# Patient Record
Sex: MALE | Race: WHITE | Employment: FULL TIME | URBAN - METROPOLITAN AREA
[De-identification: names, ages, dates, MRNs, and addresses within clinical notes are randomized per-mention and may not be internally consistent; named-entity substitution may affect disease eponyms.]

---

## 2017-07-10 PROBLEM — R06.02 SHORTNESS OF BREATH: Status: ACTIVE | Noted: 2017-07-10

## 2017-07-10 PROBLEM — Z82.49 FAMILY HISTORY OF ISCHEMIC HEART DISEASE (IHD): Status: ACTIVE | Noted: 2017-07-10

## 2017-07-17 ENCOUNTER — HOSPITAL ENCOUNTER (OUTPATIENT)
Dept: CT IMAGING | Age: 62
Discharge: HOME | End: 2017-07-17
Attending: INTERNAL MEDICINE
Payer: COMMERCIAL

## 2017-07-17 ENCOUNTER — HOSPITAL ENCOUNTER (OUTPATIENT)
Dept: GENERAL RADIOLOGY | Age: 62
Discharge: HOME | End: 2017-07-17
Attending: INTERNAL MEDICINE
Payer: COMMERCIAL

## 2017-07-17 DIAGNOSIS — Z82.49 FAMILY HISTORY OF ISCHEMIC HEART DISEASE (IHD): ICD-10-CM

## 2017-07-17 DIAGNOSIS — R06.02 SHORTNESS OF BREATH: ICD-10-CM

## 2017-07-17 PROCEDURE — 75571 CT HRT W/O DYE W/CA TEST: CPT

## 2017-07-17 PROCEDURE — 71020 XR CHEST PA LAT: CPT

## 2017-07-17 NOTE — PROGRESS NOTES
I spoke w/pt. wife the Ursula Gregory is scheduled this Friday and I moved up fu appt. to Monday the 24th w/DR. Ruiz/MA. Wife voiced understanding.

## 2017-07-24 ENCOUNTER — HOSPITAL ENCOUNTER (OUTPATIENT)
Dept: LAB | Age: 62
Discharge: HOME | End: 2017-07-24
Attending: INTERNAL MEDICINE
Payer: COMMERCIAL

## 2017-07-24 DIAGNOSIS — R06.02 SHORTNESS OF BREATH: ICD-10-CM

## 2017-07-24 LAB
ANION GAP BLD CALC-SCNC: 7 MMOL/L
BASOPHILS # BLD AUTO: 0 K/UL (ref 0–0.2)
BASOPHILS # BLD: 0 % (ref 0–2)
BUN SERPL-MCNC: 28 MG/DL (ref 8–23)
CALCIUM SERPL-MCNC: 9 MG/DL (ref 8.3–10.4)
CHLORIDE SERPL-SCNC: 100 MMOL/L (ref 98–107)
CO2 SERPL-SCNC: 29 MMOL/L (ref 21–32)
CREAT SERPL-MCNC: 0.9 MG/DL (ref 0.8–1.5)
DIFFERENTIAL METHOD BLD: ABNORMAL
EOSINOPHIL # BLD: 0.1 K/UL (ref 0–0.8)
EOSINOPHIL NFR BLD: 2 % (ref 0.5–7.8)
ERYTHROCYTE [DISTWIDTH] IN BLOOD BY AUTOMATED COUNT: 11.9 % (ref 11.9–14.6)
GLUCOSE SERPL-MCNC: 96 MG/DL (ref 65–100)
HCT VFR BLD AUTO: 42 % (ref 41.1–50.3)
HGB BLD-MCNC: 14.4 G/DL (ref 13.6–17.2)
INR PPP: 1.1 (ref 0.9–1.2)
LYMPHOCYTES # BLD AUTO: 30 % (ref 13–44)
LYMPHOCYTES # BLD: 1.4 K/UL (ref 0.5–4.6)
MCH RBC QN AUTO: 32.8 PG (ref 26.1–32.9)
MCHC RBC AUTO-ENTMCNC: 34.3 G/DL (ref 31.4–35)
MCV RBC AUTO: 95.7 FL (ref 79.6–97.8)
MONOCYTES # BLD: 0.5 K/UL (ref 0.1–1.3)
MONOCYTES NFR BLD AUTO: 11 % (ref 4–12)
NEUTS SEG # BLD: 2.7 K/UL (ref 1.7–8.2)
NEUTS SEG NFR BLD AUTO: 57 % (ref 43–78)
PLATELET # BLD AUTO: 155 K/UL (ref 150–450)
PMV BLD AUTO: 8.5 FL (ref 10.8–14.1)
POTASSIUM SERPL-SCNC: 3.9 MMOL/L (ref 3.5–5.1)
PROTHROMBIN TIME: 10.9 SEC (ref 9.6–12)
RBC # BLD AUTO: 4.39 M/UL (ref 4.23–5.67)
SODIUM SERPL-SCNC: 136 MMOL/L (ref 136–145)
WBC # BLD AUTO: 4.8 K/UL (ref 4.3–11.1)

## 2017-07-24 PROCEDURE — 36415 COLL VENOUS BLD VENIPUNCTURE: CPT | Performed by: INTERNAL MEDICINE

## 2017-07-24 PROCEDURE — 85610 PROTHROMBIN TIME: CPT | Performed by: INTERNAL MEDICINE

## 2017-07-24 PROCEDURE — 80048 BASIC METABOLIC PNL TOTAL CA: CPT | Performed by: INTERNAL MEDICINE

## 2017-07-24 PROCEDURE — 85025 COMPLETE CBC W/AUTO DIFF WBC: CPT | Performed by: INTERNAL MEDICINE

## 2017-07-25 ENCOUNTER — HOSPITAL ENCOUNTER (OUTPATIENT)
Dept: CARDIAC CATH/INVASIVE PROCEDURES | Age: 62
Discharge: HOME | End: 2017-07-25
Attending: INTERNAL MEDICINE | Admitting: INTERNAL MEDICINE
Payer: COMMERCIAL

## 2017-07-25 VITALS
HEART RATE: 80 BPM | WEIGHT: 154 LBS | BODY MASS INDEX: 22.81 KG/M2 | DIASTOLIC BLOOD PRESSURE: 70 MMHG | RESPIRATION RATE: 13 BRPM | OXYGEN SATURATION: 99 % | TEMPERATURE: 97.8 F | SYSTOLIC BLOOD PRESSURE: 113 MMHG | HEIGHT: 69 IN

## 2017-07-25 LAB
ACT BLD: 268 SECS (ref 70–128)
ATRIAL RATE: 76 BPM
CALCULATED P AXIS, ECG09: 46 DEGREES
CALCULATED R AXIS, ECG10: 37 DEGREES
CALCULATED T AXIS, ECG11: 20 DEGREES
DIAGNOSIS, 93000: NORMAL
P-R INTERVAL, ECG05: 154 MS
Q-T INTERVAL, ECG07: 370 MS
QRS DURATION, ECG06: 82 MS
QTC CALCULATION (BEZET), ECG08: 416 MS
VENTRICULAR RATE, ECG03: 76 BPM

## 2017-07-25 PROCEDURE — 93005 ELECTROCARDIOGRAM TRACING: CPT | Performed by: INTERNAL MEDICINE

## 2017-07-25 PROCEDURE — C1887 CATHETER, GUIDING: HCPCS

## 2017-07-25 PROCEDURE — 74011000250 HC RX REV CODE- 250: Performed by: INTERNAL MEDICINE

## 2017-07-25 PROCEDURE — 93458 L HRT ARTERY/VENTRICLE ANGIO: CPT

## 2017-07-25 PROCEDURE — 74011636320 HC RX REV CODE- 636/320: Performed by: INTERNAL MEDICINE

## 2017-07-25 PROCEDURE — 85347 COAGULATION TIME ACTIVATED: CPT

## 2017-07-25 PROCEDURE — 74011000258 HC RX REV CODE- 258: Performed by: INTERNAL MEDICINE

## 2017-07-25 PROCEDURE — C1769 GUIDE WIRE: HCPCS

## 2017-07-25 PROCEDURE — 77030029997 HC DEV COM RDL R BND TELE -B

## 2017-07-25 PROCEDURE — 99152 MOD SED SAME PHYS/QHP 5/>YRS: CPT

## 2017-07-25 PROCEDURE — 74011250636 HC RX REV CODE- 250/636

## 2017-07-25 PROCEDURE — 77030015766

## 2017-07-25 PROCEDURE — 99153 MOD SED SAME PHYS/QHP EA: CPT

## 2017-07-25 PROCEDURE — C1894 INTRO/SHEATH, NON-LASER: HCPCS

## 2017-07-25 PROCEDURE — 74011250636 HC RX REV CODE- 250/636: Performed by: INTERNAL MEDICINE

## 2017-07-25 PROCEDURE — 77030004534 HC CATH ANGI DX INFN CARD -A

## 2017-07-25 PROCEDURE — 93571 IV DOP VEL&/PRESS C FLO 1ST: CPT

## 2017-07-25 RX ORDER — ACETAMINOPHEN 325 MG/1
650 TABLET ORAL
Status: DISCONTINUED | OUTPATIENT
Start: 2017-07-25 | End: 2017-07-25 | Stop reason: HOSPADM

## 2017-07-25 RX ORDER — SODIUM CHLORIDE 0.9 % (FLUSH) 0.9 %
5-10 SYRINGE (ML) INJECTION AS NEEDED
Status: DISCONTINUED | OUTPATIENT
Start: 2017-07-25 | End: 2017-07-25 | Stop reason: HOSPADM

## 2017-07-25 RX ORDER — FENTANYL CITRATE 50 UG/ML
25-100 INJECTION, SOLUTION INTRAMUSCULAR; INTRAVENOUS
Status: DISCONTINUED | OUTPATIENT
Start: 2017-07-25 | End: 2017-07-25 | Stop reason: HOSPADM

## 2017-07-25 RX ORDER — HEPARIN SODIUM 200 [USP'U]/100ML
3 INJECTION, SOLUTION INTRAVENOUS CONTINUOUS
Status: DISCONTINUED | OUTPATIENT
Start: 2017-07-25 | End: 2017-07-25 | Stop reason: HOSPADM

## 2017-07-25 RX ORDER — MIDAZOLAM HYDROCHLORIDE 1 MG/ML
.5-5 INJECTION, SOLUTION INTRAMUSCULAR; INTRAVENOUS
Status: DISCONTINUED | OUTPATIENT
Start: 2017-07-25 | End: 2017-07-25 | Stop reason: HOSPADM

## 2017-07-25 RX ORDER — SODIUM CHLORIDE 9 MG/ML
75 INJECTION, SOLUTION INTRAVENOUS CONTINUOUS
Status: DISCONTINUED | OUTPATIENT
Start: 2017-07-25 | End: 2017-07-25 | Stop reason: HOSPADM

## 2017-07-25 RX ORDER — HYDROCODONE BITARTRATE AND ACETAMINOPHEN 5; 325 MG/1; MG/1
1 TABLET ORAL
Status: DISCONTINUED | OUTPATIENT
Start: 2017-07-25 | End: 2017-07-25 | Stop reason: HOSPADM

## 2017-07-25 RX ORDER — GUAIFENESIN 100 MG/5ML
324 LIQUID (ML) ORAL ONCE
Status: DISCONTINUED | OUTPATIENT
Start: 2017-07-25 | End: 2017-07-25 | Stop reason: HOSPADM

## 2017-07-25 RX ORDER — DIAZEPAM 5 MG/1
5 TABLET ORAL ONCE
Status: DISCONTINUED | OUTPATIENT
Start: 2017-07-25 | End: 2017-07-25 | Stop reason: HOSPADM

## 2017-07-25 RX ORDER — ONDANSETRON 2 MG/ML
4 INJECTION INTRAMUSCULAR; INTRAVENOUS
Status: DISCONTINUED | OUTPATIENT
Start: 2017-07-25 | End: 2017-07-25 | Stop reason: HOSPADM

## 2017-07-25 RX ORDER — LIDOCAINE HYDROCHLORIDE 20 MG/ML
1-20 INJECTION, SOLUTION INFILTRATION; PERINEURAL
Status: DISCONTINUED | OUTPATIENT
Start: 2017-07-25 | End: 2017-07-25 | Stop reason: HOSPADM

## 2017-07-25 RX ORDER — HEPARIN SODIUM 10000 [USP'U]/ML
40-80 INJECTION, SOLUTION INTRAVENOUS; SUBCUTANEOUS
Status: DISCONTINUED | OUTPATIENT
Start: 2017-07-25 | End: 2017-07-25 | Stop reason: HOSPADM

## 2017-07-25 RX ORDER — SODIUM CHLORIDE 0.9 % (FLUSH) 0.9 %
5-10 SYRINGE (ML) INJECTION EVERY 8 HOURS
Status: DISCONTINUED | OUTPATIENT
Start: 2017-07-25 | End: 2017-07-25 | Stop reason: HOSPADM

## 2017-07-25 RX ADMIN — MIDAZOLAM HYDROCHLORIDE 2 MG: 1 INJECTION, SOLUTION INTRAMUSCULAR; INTRAVENOUS at 15:42

## 2017-07-25 RX ADMIN — HEPARIN SODIUM 3 ML/HR: 200 INJECTION, SOLUTION INTRAVENOUS at 15:52

## 2017-07-25 RX ADMIN — FENTANYL CITRATE 50 MCG: 50 INJECTION, SOLUTION INTRAMUSCULAR; INTRAVENOUS at 15:35

## 2017-07-25 RX ADMIN — FENTANYL CITRATE 25 MCG: 50 INJECTION, SOLUTION INTRAMUSCULAR; INTRAVENOUS at 15:51

## 2017-07-25 RX ADMIN — ADENOSINE 140 MCG/KG/MIN: 3 INJECTION, SOLUTION INTRAVENOUS at 16:09

## 2017-07-25 RX ADMIN — MIDAZOLAM HYDROCHLORIDE 1 MG: 1 INJECTION, SOLUTION INTRAMUSCULAR; INTRAVENOUS at 15:51

## 2017-07-25 RX ADMIN — LIDOCAINE HYDROCHLORIDE 60 MG: 20 INJECTION, SOLUTION INFILTRATION; PERINEURAL at 15:52

## 2017-07-25 RX ADMIN — HEPARIN SODIUM 5000 UNITS: 10000 INJECTION, SOLUTION INTRAVENOUS; SUBCUTANEOUS at 16:21

## 2017-07-25 RX ADMIN — MIDAZOLAM HYDROCHLORIDE 2 MG: 1 INJECTION, SOLUTION INTRAMUSCULAR; INTRAVENOUS at 15:35

## 2017-07-25 RX ADMIN — IOPAMIDOL 165 ML: 755 INJECTION, SOLUTION INTRAVENOUS at 16:20

## 2017-07-25 RX ADMIN — HEPARIN SODIUM 2 ML: 10000 INJECTION, SOLUTION INTRAVENOUS; SUBCUTANEOUS at 15:48

## 2017-07-25 NOTE — PROGRESS NOTES
Patient up to bedside, vital signs and site stable. Patient ambulated to bathroom without difficulty. Patient voided without difficulty. Vascular site stable. Discharge instructions and home medications reviewed with patient. Time allowed for questions and answers. 1910 Patient ambulated second time without difficulty. Site stable after ambulation. Peripheral IV sites dc'd without difficulty with tips intact. 1915 Patient discharged to home with family.

## 2017-07-25 NOTE — PROCEDURES
[address]       Name:  Stephanie Leo   MR#:  [id number]   :  1955   Account #:  [de-identified]   Date of Adm:  2017       DATE OF PROCEDURE: 2017    PROCEDURES:    1. Left heart catheterization, selective coronary arteriography,   left ventriculogram via the right radial approach. 2. Fractional flow reserve measurement of the left anterior   descending. INDICATION: Chest discomfort consistent with [facility]   Cardiovascular class 3 anginal symptoms in a patient with a very   high calcium score and equivocal stress test showing abnormal   EKG, but normal perfusion. Cardiac catheterization recommended   by Dr. Keenan Salgado. TECHNICAL FACTORS: After informed consent was obtained, the   patient was brought to the cardiac catheterization lab. The   right radial artery was prepped and draped in the usual sterile   fashion. Utilizing modified Seldinger technique and   micropuncture needle, the right radial artery was entered. A 6-  Ethiopian Terumo Slender sheath was placed without difficulty. A   radial cocktail consisting of 2000 units of heparin, 2 mg of   verapamil, 200 mcg of nitroglycerin was administered. A 5-Ethiopian   Tiger 4.0 catheter was used to selectively engage the ostium of   the left main coronary artery and right coronary artery   respectively. Selective injections in various projections were   performed. A pigtail catheter was used to cross the aortic valve   and enter the left ventricle. Hemodynamic measurements and left   ventriculogram were obtained. Left ventricular aortic pressure   gradient was obtained by pullback technique. At the conclusion of diagnostic procedure, the patient was given   an additional 5000 units of heparin for a total of 7000. ACT was   268.  A XB 3.0 guide was used to selectively engage the ostium of   the left main coronary artery and a [employer] wire system was   advanced into the ostium of the left main coronary artery and   appropriately normalized. At this point, this was placed in the   mid to distal LAD. Intravenous adenosine at 140 mcg/kg per   minute was administered with FFR measuring at 0.77 indicating   significant LAD stenosis. This is diffuse in nature from the   proximal to mid vessel. Based on the FFR measurement and the   diffuse LAD disease, it was felt the patient would be best   served with surgical revascularization with LIMA to his LAD and   a vein graft to his left PDA. The wire was removed and final   orthogonal projections were obtained. SEDATION: The patient received a total of 5 mg of Versed and 75   mcg of fentanyl. The sedation was administered by Dejah Amaral RN, under my supervision. Sedation start time was 15:34 with a   procedure end time of 1559. CONTRAST UTILIZATION: Isovue 165. HEMODYNAMIC RESULTS:   1. Aortic pressure 119/66 with a mean of 81.   2. Left ventricular end-diastolic pressure was 9.   3. There is no significant gradient across the aortic valve. ANGIOGRAPHIC RESULTS:     1. Left main coronary artery: This is a large caliber vessel. Angiographically normal.   2. LAD: Heavily calcified in the proximal and mid segment. There   is diffuse 60% proximal to mid stenosis with a focal area of 50%   to 60% stenosis in the mid vessel. The distal vessel is patent. 3. First and second diagonal arteries are very small caliber,   less than 2 mm. 4. Left circumflex: Large caliber, dominant vessel. Contains a   40% distal stenosis. 5. Left PDA: A medium caliber vessel. Contains a 60% to 70%   eccentric proximal stenosis. 6. First obtuse marginal: Medium caliber vessel. Patent. 7. Second obtuse marginal artery: Small caliber vessel. Patent. 8. Right coronary artery: Small caliber, nondominant vessel. Contains a 60% to 70% mid stenosis. 9. Left ventriculogram performed in ROWE projection shows normal   left ventricular systolic function, EF 39% to 65%. No focal   segmental wall motion abnormalities. Aortic root is nondilated. 10. FFR of LAD 0.77 indicating hemodynamically significant   stenosis of the LAD. CONCLUSION:   1. Multivessel coronary artery disease with diffuse proximal to   mid left anterior descending disease which is heavily calcified. The fractional flow reserve measurement at 0.77 indicating   hemodynamically significant stenosis. The patient also with a   stenosis of the proximal portion of the left posterior   descending artery. 2. Normal left ventricular systolic function. 3. Successful fractional flow reserve measurement of the left   anterior descending. Fractional flow reserve was 0.77. PLAN: Surgical evaluation for LIMA to LAD and saphenous vein   graft to left PDA.         MD TORREY Gannon / Robert Stauffer   D:  07/25/2017   16:30   T:  07/25/2017   16:51   Job #:  [id number]

## 2017-07-25 NOTE — PROCEDURES
Brief Cardiac Procedure Note    Patient: Judy Coronado MRN: [id number]  SSN: [id number]    YOB: 1955  Age: 59 y.o. Sex: male      Date of Procedure: 7/25/2017     Pre-procedure Diagnosis: Chest pain CCS Class III    Post-procedure Diagnosis: Coronary Artery Disease    Procedure: Left Heart Catheterization    Brief Description of Procedure: As above    Performed By: Sachin Mckeon MD     Assistants: None    Anesthesia: Moderate Sedation    Estimated Blood Loss: Less than 10 mL      Specimens: None    Implants: None    Findings: diffuse prox and mid LAD. FFR 0.76. 60-70% prox LPDA. Normal EF. Complications: None    Recommendations: Continue medical therapy.     Signed By: Sachin Mckeon MD     July 25, 2017

## 2017-07-25 NOTE — PROGRESS NOTES
Report received from  Cath Lab RN. Procedural findings communicated. Intra procedural  medication administration reviewed. Progression of care discussed.      Patient received into [address] [facility] 6 post sheath removal.     Access site without bleeding or swelling yes    Dressing dry and intact yes    Patient instructed to limit movement to right upper  extremity    Routine post procedural vital signs and site assessment initiated yes

## 2017-07-25 NOTE — PROGRESS NOTES
TRANSFER - OUT REPORT:    Verbal report given to Kelsie Barnett RN(name) on Lindsay Coto  being transferred to cpru(unit) for routine progression of care       Report consisted of patients Situation, Background, Assessment and   Recommendations(SBAR). Information from the following report(s) SBAR was reviewed with the receiving nurse.    has No Known Allergies. Opportunity for questions and clarification was provided. Procedure Summary:Pt had LHC with FFR of LAD via R wrist. Site sealed with R band using 9 ml at 1622 hrs.     Med Administration    Versed:  5 mg  Fentanyl: 75 mcg  Heparin: 5000 units      Visit Vitals    /70 (BP 1 Location: Left arm, BP Patient Position: Supine)    Pulse 62    Temp 97.8 °F (36.6 °C)    Resp 18    Ht 5' 9\" (1.753 m)    Wt 69.9 kg (154 lb)    SpO2 96%    BMI 22.74 kg/m2     Past Medical History:   Diagnosis Date    Hypercholesterolemia     Hypertension     Leukopenia            Peripheral IV 07/25/17 Left Antecubital (Active)       Peripheral IV 07/25/17 Right Antecubital (Active)

## 2017-07-25 NOTE — DISCHARGE INSTRUCTIONS
Call Dr. India Ochoa' office in the morning at [phone]. HEART CATHETERIZATION/ANGIOGRAPHY DISCHARGE INSTRUCTIONS    1. Check puncture site frequently for swelling or bleeding. If there is any bleeding, lie down and apply pressure over the area with a clean towel or washcloth and call [phone]. Notify your doctor for any redness, swelling, drainage, or oozing from the puncture site. Notify your doctor for any fever or chills. 2. If the extremity becomes cold, numb, or painful call Dr. Carly Porras at [phone].  3. Activity should be limited for the next 48 hours. Avoid pushing, pulling and bending of affected wrist for 48 hours. No heavy lifting (anything over 5 pounds) for 3 days. No driving for 48 hours. 4. You may resume your usual diet. Drink more fluids than usual.  5. Have a responsible person drive you home and stay with you for at least 24 hours after your heart catheterization/angiography. 6. You may remove bandage from your right arm  in 24 hours. You may shower in 24 hours. No tub baths, hot tubs, or swimming for 1 week. Do not place any lotions, creams, powders, or ointments over puncture site for 1 week. You may place a clean band-aid over the puncture site each day for 5 days. Change daily. I have read the above instructions and have had the opportunity to ask questions.

## 2017-07-25 NOTE — IP AVS SNAPSHOT
Home 
 
 
 [address] 
[phone] Patient: Ariel San MRN: [id number] :1955 Discharge Summary 2017 Ariel San MRN[de-identified]  [id number] Admission Information Provider Pager Service Admission Date Expected D/C Date Adamaris Heath MD  CARDIAC CATH LAB 2017 Actual LOS Patient Class 0 days OUTPATIENT Follow-up Information None Current Discharge Medication List  
  
ASK your doctor about these medications Dose & Instructions Dispensing Information Comments Morning Noon Evening Bedtime  
 aspirin delayed-release 81 mg tablet Your last dose was: Your next dose is: Take  by mouth daily. Refills:  0  
     
   
   
   
  
 B COMPLEX 1 tablet Generic drug:  b complex vitamins Your last dose was: Your next dose is:    
   
   
 Dose:  1 Tab Take 1 Tab by mouth daily. Refills:  0  
     
   
   
   
  
 benazepril 40 mg tablet Commonly known as:  LOTENSIN Your last dose was: Your next dose is:    
   
   
 Dose:  40 mg Take 40 mg by mouth daily. Refills:  0  
     
   
   
   
  
 fish oil-omega-3 fatty acids 340-1,000 mg capsule Your last dose was: Your next dose is:    
   
   
 Dose:  1 Cap Take 1 Cap by mouth daily. Refills:  0  
     
   
   
   
  
 multivitamin tablet Commonly known as:  ONE A DAY Your last dose was: Your next dose is:    
   
   
 Dose:  1 Tab Take 1 Tab by mouth daily. Refills:  0  
     
   
   
   
  
 simvastatin 80 mg tablet Commonly known as:  ZOCOR Your last dose was: Your next dose is:    
   
   
 Dose:  80 mg Take 80 mg by mouth nightly. Refills:  0 General Information Please provide this summary of care documentation to your next provider. Allergies No Known Allergies Current Immunizations  Reviewed on 1/5/2014 Name Date Influenza Vaccine 10/28/2013 Td 12/1/2010 Tdap 12/1/2013 Zoster Vaccine, Live 11/25/2013 Discharge Instructions Discharge Instructions Call Dr. Amadou Hale' office in the morning at [phone]. HEART CATHETERIZATION/ANGIOGRAPHY DISCHARGE INSTRUCTIONS 1. Check puncture site frequently for swelling or bleeding. If there is any bleeding, lie down and apply pressure over the area with a clean towel or washcloth and call [phone]. Notify your doctor for any redness, swelling, drainage, or oozing from the puncture site. Notify your doctor for any fever or chills. 2. If the extremity becomes cold, numb, or painful call Dr. Van Blanco at [phone]. 
3. Activity should be limited for the next 48 hours. Avoid pushing, pulling and bending of affected wrist for 48 hours. No heavy lifting (anything over 5 pounds) for 3 days. No driving for 48 hours. 4. You may resume your usual diet. Drink more fluids than usual. 
5. Have a responsible person drive you home and stay with you for at least 24 hours after your heart catheterization/angiography. 6. You may remove bandage from your right arm  in 24 hours. You may shower in 24 hours. No tub baths, hot tubs, or swimming for 1 week. Do not place any lotions, creams, powders, or ointments over puncture site for 1 week. You may place a clean band-aid over the puncture site each day for 5 days. Change daily. I have read the above instructions and have had the opportunity to ask questions. Discharge Orders None  
  
` Patient Signature:  ____________________________________________________________ Date:  ____________________________________________________________  
  
 [facility] Provider Signature:  ____________________________________________________________ Date:  ____________________________________________________________

## 2017-07-25 NOTE — PROGRESS NOTES
Terumo band completely deflated. 1900 Terumo band removed from right wrist using sterile technique. Sterile dressing applied. No signs and symptoms of bleeding, oozing or hematoma.

## 2017-07-27 ENCOUNTER — ANESTHESIA EVENT (OUTPATIENT)
Dept: SURGERY | Age: 62
DRG: 235 | End: 2017-07-27
Payer: COMMERCIAL

## 2017-07-28 ENCOUNTER — HOSPITAL ENCOUNTER (OUTPATIENT)
Dept: SURGERY | Age: 62
Discharge: HOME | End: 2017-07-28
Payer: COMMERCIAL

## 2017-07-28 ENCOUNTER — HOSPITAL ENCOUNTER (OUTPATIENT)
Dept: GENERAL RADIOLOGY | Age: 62
Discharge: HOME | End: 2017-07-28
Attending: THORACIC SURGERY (CARDIOTHORACIC VASCULAR SURGERY)
Payer: COMMERCIAL

## 2017-07-28 ENCOUNTER — HOSPITAL ENCOUNTER (OUTPATIENT)
Dept: ULTRASOUND IMAGING | Age: 62
Discharge: HOME | End: 2017-07-28
Attending: THORACIC SURGERY (CARDIOTHORACIC VASCULAR SURGERY)
Payer: COMMERCIAL

## 2017-07-28 VITALS
SYSTOLIC BLOOD PRESSURE: 140 MMHG | HEART RATE: 76 BPM | HEIGHT: 69 IN | DIASTOLIC BLOOD PRESSURE: 71 MMHG | OXYGEN SATURATION: 98 % | WEIGHT: 161.19 LBS | TEMPERATURE: 98 F | RESPIRATION RATE: 16 BRPM | BODY MASS INDEX: 23.87 KG/M2

## 2017-07-28 LAB
ALBUMIN SERPL BCP-MCNC: 3.6 G/DL (ref 3.2–4.6)
ANION GAP BLD CALC-SCNC: 10 MMOL/L (ref 7–16)
APPEARANCE UR: CLEAR
APTT PPP: 24.4 SEC (ref 23.5–31.7)
BACTERIA SPEC CULT: ABNORMAL
BILIRUB SERPL-MCNC: 0.3 MG/DL (ref 0.2–1.1)
BILIRUB UR QL: NEGATIVE
BUN SERPL-MCNC: 18 MG/DL (ref 8–23)
CALCIUM SERPL-MCNC: 9 MG/DL (ref 8.3–10.4)
CHLORIDE SERPL-SCNC: 104 MMOL/L (ref 98–107)
CO2 SERPL-SCNC: 28 MMOL/L (ref 21–32)
COLOR UR: YELLOW
CREAT SERPL-MCNC: 0.67 MG/DL (ref 0.8–1.5)
ERYTHROCYTE [DISTWIDTH] IN BLOOD BY AUTOMATED COUNT: 12.8 % (ref 11.9–14.6)
EST. AVERAGE GLUCOSE BLD GHB EST-MCNC: 103 MG/DL
GLUCOSE SERPL-MCNC: 91 MG/DL (ref 65–100)
GLUCOSE UR STRIP.AUTO-MCNC: NEGATIVE MG/DL
HBA1C MFR BLD: 5.2 % (ref 4.8–6)
HCT VFR BLD AUTO: 39.4 % (ref 41.1–50.3)
HGB BLD-MCNC: 13.6 G/DL (ref 13.6–17.2)
HGB UR QL STRIP: NEGATIVE
INR PPP: 1 (ref 0.9–1.2)
KETONES UR QL STRIP.AUTO: NEGATIVE MG/DL
LEUKOCYTE ESTERASE UR QL STRIP.AUTO: NEGATIVE
MCH RBC QN AUTO: 31.9 PG (ref 26.1–32.9)
MCHC RBC AUTO-ENTMCNC: 34.5 G/DL (ref 31.4–35)
MCV RBC AUTO: 92.3 FL (ref 79.6–97.8)
NITRITE UR QL STRIP.AUTO: NEGATIVE
PH UR STRIP: 7 [PH] (ref 5–9)
PLATELET # BLD AUTO: 143 K/UL (ref 150–450)
PMV BLD AUTO: 8.8 FL (ref 10.8–14.1)
POTASSIUM SERPL-SCNC: 4.1 MMOL/L (ref 3.5–5.1)
PROT UR STRIP-MCNC: NEGATIVE MG/DL
PROTHROMBIN TIME: 10.9 SEC (ref 9.6–12)
RBC # BLD AUTO: 4.27 M/UL (ref 4.23–5.67)
SERVICE CMNT-IMP: ABNORMAL
SODIUM SERPL-SCNC: 142 MMOL/L (ref 136–145)
SP GR UR REFRACTOMETRY: 1.02 (ref 1–1.02)
UROBILINOGEN UR QL STRIP.AUTO: 0.2 EU/DL (ref 0.2–1)
WBC # BLD AUTO: 3.2 K/UL (ref 4.3–11.1)

## 2017-07-28 PROCEDURE — 87641 MR-STAPH DNA AMP PROBE: CPT | Performed by: THORACIC SURGERY (CARDIOTHORACIC VASCULAR SURGERY)

## 2017-07-28 PROCEDURE — 82247 BILIRUBIN TOTAL: CPT | Performed by: THORACIC SURGERY (CARDIOTHORACIC VASCULAR SURGERY)

## 2017-07-28 PROCEDURE — 85730 THROMBOPLASTIN TIME PARTIAL: CPT | Performed by: THORACIC SURGERY (CARDIOTHORACIC VASCULAR SURGERY)

## 2017-07-28 PROCEDURE — 85027 COMPLETE CBC AUTOMATED: CPT | Performed by: THORACIC SURGERY (CARDIOTHORACIC VASCULAR SURGERY)

## 2017-07-28 PROCEDURE — 77030027138 HC INCENT SPIROMETER -A

## 2017-07-28 PROCEDURE — 71020 XR CHEST PA LAT: CPT

## 2017-07-28 PROCEDURE — 82040 ASSAY OF SERUM ALBUMIN: CPT | Performed by: THORACIC SURGERY (CARDIOTHORACIC VASCULAR SURGERY)

## 2017-07-28 PROCEDURE — 93880 EXTRACRANIAL BILAT STUDY: CPT

## 2017-07-28 PROCEDURE — 83036 HEMOGLOBIN GLYCOSYLATED A1C: CPT | Performed by: THORACIC SURGERY (CARDIOTHORACIC VASCULAR SURGERY)

## 2017-07-28 PROCEDURE — 81003 URINALYSIS AUTO W/O SCOPE: CPT | Performed by: THORACIC SURGERY (CARDIOTHORACIC VASCULAR SURGERY)

## 2017-07-28 PROCEDURE — 85610 PROTHROMBIN TIME: CPT | Performed by: THORACIC SURGERY (CARDIOTHORACIC VASCULAR SURGERY)

## 2017-07-28 PROCEDURE — 80048 BASIC METABOLIC PNL TOTAL CA: CPT | Performed by: THORACIC SURGERY (CARDIOTHORACIC VASCULAR SURGERY)

## 2017-07-28 PROCEDURE — 94010 BREATHING CAPACITY TEST: CPT

## 2017-07-28 RX ORDER — CALCIUM CARBONATE 600 MG
600 TABLET ORAL DAILY
COMMUNITY

## 2017-07-28 RX ORDER — SODIUM CHLORIDE 9 MG/ML
250 INJECTION, SOLUTION INTRAVENOUS AS NEEDED
Status: DISCONTINUED | OUTPATIENT
Start: 2017-07-28 | End: 2017-08-01 | Stop reason: HOSPADM

## 2017-07-28 NOTE — PERIOP NOTES
Call to  [facility] pharmacy @ [phone]. LM on RX line for mupirocin ointment 22 gram tube. Apply intranasally BID starting 5 days prior to surgery. no refills. Contact # left of [phone]. Call to pt @ [phone] to inform of nasal swab results. Spoke with pt and with wife - had self RN on speaker phone . Understanding verbalized on all instructions, aware to  RX, and bring log sheet to hospital DOS.

## 2017-07-28 NOTE — PERIOP NOTES
Patient verified name, , and surgery as listed in [facility]. Patient provided medical/health information and PTA medications to the best of their ability. TYPE  CASE: 3  Labs per surgeon: yes, labs CBC, BMP, HgbA1C, PT/PTT/INR, UA, MRSA/SA nasal swab, total bilirubin, total albumin, 2 units FFP, 2 PK Platlets, T and C 2 units, CXR, bilateral carotid ultrasound  - pt coming to main lab 17 for additional blood work. Orders released form PAT phase of care and all orders faxed to main lab @ [phone]. Confirmation on chart. Labs per anesthesia protocol: no additional  EKG:  Needed  Nadine to see pt at time of PAT and reviewed the following - ECHO 10-14-13, EKG 7-10-17, heart cath 17, [place] cardiology note 17. Pt ok for surgery. Patient provided with and instructed on educaitonal handouts including Heart Guide to Surgery, blood transfusions, pain management, central line infection prevention, being on a ventilator and hand hygiene for the family and community, and [facility] brochure. Instructed that family must be present in building at all times. Incentive spirometry completed with return demonstration. FEV1 completed as ordered. Patient viewed pre-operative DVD. Instructed on chest-xray procedure and carotid ultrasound. Instructed on type and cross match procedure and not to remove green blood bank bracelet. Instructed on medications- Amiodarone 600 mg, Lopressor 12.5 mgand Mupirocin with Rx called into  [facility] @ [phone]. LM on RX line. Mupirocin ointment to be used the night before surgery and the am of surgery. Hibiclens and instructions given per hospital policy. Instructed patient to continue previous medications as prescribed prior to surgery unless otherwise directed and to take the following medications the day of surgery according to anesthesia guidelines : ASA 81 mg, simvastatin .   Instructed patient to hold  the following medications on the day of surgery: B complex, Caltrate, multivitamin, fish oil . Original medication prescription bottles NOT visualized during patient appointment. Patient teach back successful and patient demonstrates knowledge of instruction.

## 2017-07-28 NOTE — ANESTHESIA PREPROCEDURE EVALUATION
Anesthetic History   No history of anesthetic complications            Review of Systems / Medical History  Patient summary reviewed and pertinent labs reviewed    Pulmonary  Within defined limits                 Neuro/Psych   Within defined limits           Cardiovascular    Hypertension: well controlled          CAD    Exercise tolerance: >4 METS  Comments: Echo: Preserved EF, No valvular abnormalites    Cath: LAD, RCA, PDA 60-70% occluded    Denies CP, SOB, Palps, Syncope  Endorses mild increase in fatigue   GI/Hepatic/Renal  Within defined limits              Endo/Other  Within defined limits           Other Findings   Comments: Denies Dysphagia         Physical Exam    Airway  Mallampati: I  TM Distance: 4 - 6 cm  Neck ROM: normal range of motion   Mouth opening: Normal     Cardiovascular    Rhythm: regular  Rate: normal         Dental  No notable dental hx       Pulmonary  Breath sounds clear to auscultation               Abdominal  GI exam deferred       Other Findings            Anesthetic Plan    ASA: 4  Anesthesia type: general    Monitoring Plan: Arterial line, [place], SEA and CVP      Induction: Intravenous  Anesthetic plan and risks discussed with: Patient

## 2017-07-28 NOTE — PERIOP NOTES
CHEST X-RAY, 2 views.     HISTORY:  Preoperative evaluation for CABG surgery.     TECHNIQUE: PA and lateral views.     COMPARISON: 17 July 2017.     FINDINGS: The lungs are clear. The heart size is normal. The costophrenic angles  are sharp. The pulmonary vasculature is unremarkable. Included portion of the  upper abdomen is unremarkable.      IMPRESSION: Negative for acute abnormality. TITLE:  Carotid and Vertebral Ultrasound Examination.     INDICATION:  Preoperative coronary artery bypass graft surgery planning. Coronary artery disease.     TECHNIQUE: Grayscale, color, and Doppler interrogation performed. All velocity  measurements are made in relation to the distal internal carotid artery. The  degree of stenosis is inferred from velocity parameters and cross referenced to  published correlations. Velocity criteria are extrapolated from diameter data  as defined in the [address] [place] [address] Road symptomatic carotid endarterectomy trial  (NASCET).      COMPARISON: None.     RIGHT NECK:  The peak systolic velocity in the Common Carotid Artery = 72  cm/sec; Internal Carotid Artery = 263 cm/sec. Ratio = 3.7.       Garita, echogenic, shadowing plaque in the distal CCA/carotid bulb. Waveform  broadening in the proximal CINTHYA. Anterograde flow in the vertebral artery.     LEFT  NECK:  The peak systolic velocity in the Common Carotid Artery = 63  cm/sec; Internal Carotid Artery = 90 cm/sec. Ratio = 1.4.       Echogenic and shadowing plaque in the carotid bulb. No waveform broadening. Anterograde flow in the vertebral artery.     IMPRESSION:    CINTHYA:  70-99% stenosis based on elevated peak systolic velocity. 3.7 ICA/CCA  ratio suggests a slightly lower degree of stenosis. Calcified atherosclerotic  plaque.     LICA:  Less than 44% stenosis.  Calcified atherosclerotic plaque.     The significant finding of right internal carotid artery stenosis was relayed to  GivU via the telephone at the time of dictation.     Imaging      Recent Results (from the past 12 hour(s))   URINALYSIS W/ RFLX MICROSCOPIC    Collection Time: 07/28/17  7:39 AM   Result Value Ref Range    Color YELLOW      Appearance CLEAR      Specific gravity 1.019 1.001 - 1.023      pH (UA) 7.0 5.0 - 9.0      Protein NEGATIVE  NEG mg/dL    Glucose NEGATIVE  mg/dL    Ketone NEGATIVE  NEG mg/dL    Bilirubin NEGATIVE  NEG      Blood NEGATIVE  NEG      Urobilinogen 0.2 0.2 - 1.0 EU/dL    Nitrites NEGATIVE  NEG      Leukocyte Esterase NEGATIVE  NEG     CBC W/O DIFF    Collection Time: 07/28/17  8:15 AM   Result Value Ref Range    WBC 3.2 (L) 4.3 - 11.1 K/uL    RBC 4.27 4.23 - 5.67 M/uL    HGB 13.6 13.6 - 17.2 g/dL    HCT 39.4 (L) 41.1 - 50.3 %    MCV 92.3 79.6 - 97.8 FL    MCH 31.9 26.1 - 32.9 PG    MCHC 34.5 31.4 - 35.0 g/dL    RDW 12.8 11.9 - 14.6 %    PLATELET 621 (L) 559 - 450 K/uL    MPV 8.8 (L) 10.8 - 77.1 FL   METABOLIC PANEL, BASIC    Collection Time: 07/28/17  8:15 AM   Result Value Ref Range    Sodium 142 136 - 145 mmol/L    Potassium 4.1 3.5 - 5.1 mmol/L    Chloride 104 98 - 107 mmol/L    CO2 28 21 - 32 mmol/L    Anion gap 10 7 - 16 mmol/L    Glucose 91 65 - 100 mg/dL    BUN 18 8 - 23 MG/DL    Creatinine 0.67 (L) 0.8 - 1.5 MG/DL    GFR est AA >60 >60 ml/min/1.73m2    GFR est non-AA >60 >60 ml/min/1.73m2    Calcium 9.0 8.3 - 10.4 MG/DL   HEMOGLOBIN A1C WITH EAG    Collection Time: 07/28/17  8:15 AM   Result Value Ref Range    Hemoglobin A1c 5.2 4.8 - 6.0 %    Est. average glucose 103 mg/dL   PROTHROMBIN TIME + INR    Collection Time: 07/28/17  8:15 AM   Result Value Ref Range    Prothrombin time 10.9 9.6 - 12.0 sec    INR 1.0 0.9 - 1.2     PTT    Collection Time: 07/28/17  8:15 AM   Result Value Ref Range    aPTT 24.4 23.5 - 31.7 SEC   MSSA/MRSA SC BY PCR, NASAL SWAB    Collection Time: 07/28/17  8:15 AM   Result Value Ref Range    Special Requests: NO SPECIAL REQUESTS      Culture result: (A)       MRSA target DNA not detected, SA target DNA detected.    A MRSA negative, SA positive test result does not preclude MRSA nasal colonization. BILIRUBIN, TOTAL    Collection Time: 07/28/17  8:15 AM   Result Value Ref Range    Bilirubin, total 0.3 0.2 - 1.1 MG/DL   ALBUMIN    Collection Time: 07/28/17  8:15 AM   Result Value Ref Range    Albumin 3.6 3.2 - 4.6 g/dL     Reviewed and routed to Ashvin Hsieh. - special attention carotid ultrasound.

## 2017-07-30 ENCOUNTER — HOSPITAL ENCOUNTER (OUTPATIENT)
Dept: LAB | Age: 62
Discharge: HOME | End: 2017-07-30
Payer: COMMERCIAL

## 2017-07-30 PROCEDURE — 86900 BLOOD TYPING SEROLOGIC ABO: CPT | Performed by: THORACIC SURGERY (CARDIOTHORACIC VASCULAR SURGERY)

## 2017-07-30 PROCEDURE — 36415 COLL VENOUS BLD VENIPUNCTURE: CPT | Performed by: THORACIC SURGERY (CARDIOTHORACIC VASCULAR SURGERY)

## 2017-07-30 PROCEDURE — 86923 COMPATIBILITY TEST ELECTRIC: CPT | Performed by: THORACIC SURGERY (CARDIOTHORACIC VASCULAR SURGERY)

## 2017-07-31 ENCOUNTER — HOSPITAL ENCOUNTER (INPATIENT)
Age: 62
LOS: 3 days | Discharge: HOME HEALTH | DRG: 235 | End: 2017-08-03
Attending: THORACIC SURGERY (CARDIOTHORACIC VASCULAR SURGERY) | Admitting: THORACIC SURGERY (CARDIOTHORACIC VASCULAR SURGERY)
Payer: COMMERCIAL

## 2017-07-31 ENCOUNTER — APPOINTMENT (OUTPATIENT)
Dept: GENERAL RADIOLOGY | Age: 62
DRG: 235 | End: 2017-07-31
Attending: THORACIC SURGERY (CARDIOTHORACIC VASCULAR SURGERY)
Payer: COMMERCIAL

## 2017-07-31 ENCOUNTER — ANESTHESIA (OUTPATIENT)
Dept: SURGERY | Age: 62
DRG: 235 | End: 2017-07-31
Payer: COMMERCIAL

## 2017-07-31 DIAGNOSIS — R09.02 HYPOXIA: ICD-10-CM

## 2017-07-31 DIAGNOSIS — Z95.1 S/P CABG (CORONARY ARTERY BYPASS GRAFT): ICD-10-CM

## 2017-07-31 DIAGNOSIS — J95.821 RESPIRATORY FAILURE, POST-OPERATIVE (HCC): ICD-10-CM

## 2017-07-31 DIAGNOSIS — R06.02 SHORTNESS OF BREATH: ICD-10-CM

## 2017-07-31 PROBLEM — I25.10 CAD (CORONARY ARTERY DISEASE): Status: ACTIVE | Noted: 2017-07-31

## 2017-07-31 PROBLEM — Z82.49 FAMILY HISTORY OF ISCHEMIC HEART DISEASE (IHD): Chronic | Status: ACTIVE | Noted: 2017-07-10

## 2017-07-31 LAB
ANION GAP BLD CALC-SCNC: 10 MMOL/L (ref 7–16)
APTT PPP: 27.8 SEC (ref 23.5–31.7)
ARTERIAL PATENCY WRIST A: ABNORMAL
ATRIAL RATE: 80 BPM
BASE DEFICIT BLD-SCNC: 1 MMOL/L
BASE DEFICIT BLD-SCNC: 1 MMOL/L
BASE DEFICIT BLDA-SCNC: 1.4 MMOL/L (ref 0–2)
BASE EXCESS BLD CALC-SCNC: 0 MMOL/L
BASE EXCESS BLD CALC-SCNC: 1 MMOL/L
BASE EXCESS BLD CALC-SCNC: 1 MMOL/L
BASE EXCESS BLD CALC-SCNC: 2 MMOL/L
BDY SITE: ABNORMAL
BUN SERPL-MCNC: 18 MG/DL (ref 8–23)
CA-I BLD-MCNC: 1.01 MMOL/L (ref 1.12–1.32)
CA-I BLD-MCNC: 1.04 MMOL/L (ref 1.12–1.32)
CA-I BLD-MCNC: 1.07 MMOL/L (ref 1.12–1.32)
CA-I BLD-MCNC: 1.09 MMOL/L (ref 1.12–1.32)
CA-I BLD-MCNC: 1.1 MMOL/L (ref 1.12–1.32)
CA-I BLD-MCNC: 1.15 MMOL/L (ref 1.12–1.32)
CA-I BLD-SCNC: 1.12 MMOL/L (ref 1–1.3)
CALCIUM SERPL-MCNC: 7.2 MG/DL (ref 8.3–10.4)
CALCULATED P AXIS, ECG09: 76 DEGREES
CALCULATED R AXIS, ECG10: 83 DEGREES
CALCULATED T AXIS, ECG11: 65 DEGREES
CHLORIDE BLDA-SCNC: 108 MMOL/L (ref 98–106)
CHLORIDE SERPL-SCNC: 109 MMOL/L (ref 98–107)
CO2 SERPL-SCNC: 25 MMOL/L (ref 21–32)
COHGB MFR BLD: 0.3 % (ref 0.5–1.5)
CREAT SERPL-MCNC: 0.74 MG/DL (ref 0.8–1.5)
DIAGNOSIS, 93000: NORMAL
DO-HGB BLD-MCNC: 1 % (ref 0–5)
ERYTHROCYTE [DISTWIDTH] IN BLOOD BY AUTOMATED COUNT: 12.5 % (ref 11.9–14.6)
FIBRINOGEN PPP-MCNC: 176 MG/DL (ref 172–437)
FIO2 ON VENT: 60 %
GLUCOSE BLD STRIP.AUTO-MCNC: 106 MG/DL (ref 65–100)
GLUCOSE BLD STRIP.AUTO-MCNC: 112 MG/DL (ref 65–100)
GLUCOSE BLD STRIP.AUTO-MCNC: 119 MG/DL (ref 65–100)
GLUCOSE BLD STRIP.AUTO-MCNC: 120 MG/DL (ref 65–100)
GLUCOSE BLD STRIP.AUTO-MCNC: 128 MG/DL (ref 65–100)
GLUCOSE BLD STRIP.AUTO-MCNC: 135 MG/DL (ref 65–100)
GLUCOSE BLD STRIP.AUTO-MCNC: 83 MG/DL (ref 70–105)
GLUCOSE BLD STRIP.AUTO-MCNC: 86 MG/DL (ref 70–105)
GLUCOSE BLD STRIP.AUTO-MCNC: 92 MG/DL (ref 65–100)
GLUCOSE BLD STRIP.AUTO-MCNC: 94 MG/DL (ref 70–105)
GLUCOSE BLD STRIP.AUTO-MCNC: 95 MG/DL (ref 70–105)
GLUCOSE BLD STRIP.AUTO-MCNC: 98 MG/DL (ref 70–105)
GLUCOSE BLD STRIP.AUTO-MCNC: 99 MG/DL (ref 70–105)
GLUCOSE SERPL-MCNC: 87 MG/DL (ref 65–100)
HCO3 BLD-SCNC: 24 MMOL/L (ref 22–26)
HCO3 BLD-SCNC: 24.4 MMOL/L (ref 22–26)
HCO3 BLD-SCNC: 25.1 MMOL/L (ref 22–26)
HCO3 BLD-SCNC: 25.6 MMOL/L (ref 22–26)
HCO3 BLD-SCNC: 26.5 MMOL/L (ref 22–26)
HCO3 BLD-SCNC: 27.8 MMOL/L (ref 22–26)
HCO3 BLDA-SCNC: 24 MMOL/L (ref 22–26)
HCT VFR BLD AUTO: 33.4 % (ref 41.1–50.3)
HCT VFR BLD AUTO: 34.5 % (ref 41.1–50.3)
HCT VFR BLD AUTO: 35.5 % (ref 41.1–50.3)
HGB BLD-MCNC: 11.3 G/DL (ref 13.6–17.2)
HGB BLD-MCNC: 11.8 G/DL (ref 13.6–17.2)
HGB BLD-MCNC: 11.8 G/DL (ref 13.6–17.2)
HGB BLDMV-MCNC: 13 GM/DL (ref 11.7–15)
INR PPP: 1.2 (ref 0.9–1.2)
MAGNESIUM SERPL-MCNC: 2.5 MG/DL (ref 1.8–2.4)
MAGNESIUM SERPL-MCNC: 2.7 MG/DL (ref 1.8–2.4)
MAGNESIUM SERPL-MCNC: 3.4 MG/DL (ref 1.8–2.4)
MCH RBC QN AUTO: 31.3 PG (ref 26.1–32.9)
MCHC RBC AUTO-ENTMCNC: 33.2 G/DL (ref 31.4–35)
MCV RBC AUTO: 94.2 FL (ref 79.6–97.8)
METHGB MFR BLD: 0.6 % (ref 0–1.5)
OXYHGB MFR BLDA: 98.1 % (ref 94–97)
P-R INTERVAL, ECG05: 162 MS
PCO2 BLD: 37.8 MMHG (ref 35–45)
PCO2 BLD: 40 MMHG (ref 35–45)
PCO2 BLD: 42.4 MMHG (ref 35–45)
PCO2 BLD: 47.9 MMHG (ref 35–45)
PCO2 BLD: 48.2 MMHG (ref 35–45)
PCO2 BLD: 50.2 MMHG (ref 35–45)
PCO2 BLDA: 42 MMHG (ref 35–45)
PEEP RESPIRATORY: 8 CM[H2O]
PH BLD: 7.33 [PH] (ref 7.35–7.45)
PH BLD: 7.33 [PH] (ref 7.35–7.45)
PH BLD: 7.37 [PH] (ref 7.35–7.45)
PH BLD: 7.37 [PH] (ref 7.35–7.45)
PH BLD: 7.39 [PH] (ref 7.35–7.45)
PH BLD: 7.43 [PH] (ref 7.35–7.45)
PH BLDA: 7.37 [PH] (ref 7.35–7.45)
PLATELET # BLD AUTO: 103 K/UL (ref 150–450)
PMV BLD AUTO: 9 FL (ref 10.8–14.1)
PO2 BLD: 241 MMHG (ref 80–105)
PO2 BLD: 258 MMHG (ref 80–105)
PO2 BLD: 346 MMHG (ref 80–105)
PO2 BLD: 426 MMHG (ref 80–105)
PO2 BLD: 515 MMHG (ref 80–105)
PO2 BLD: 548 MMHG (ref 80–105)
PO2 BLDA: 270 MMHG (ref 75–100)
POTASSIUM BLD-SCNC: 4 MMOL/L (ref 3.5–5.1)
POTASSIUM BLD-SCNC: 4.3 MMOL/L (ref 3.5–5.1)
POTASSIUM BLD-SCNC: 4.3 MMOL/L (ref 3.5–5.1)
POTASSIUM BLD-SCNC: 4.7 MMOL/L (ref 3.5–5.1)
POTASSIUM BLD-SCNC: 5 MMOL/L (ref 3.5–5.1)
POTASSIUM BLD-SCNC: 6.6 MMOL/L (ref 3.5–5.1)
POTASSIUM BLDA-SCNC: 3.79 MMOL/L (ref 3.5–5.3)
POTASSIUM SERPL-SCNC: 3.8 MMOL/L (ref 3.5–5.1)
POTASSIUM SERPL-SCNC: 4.1 MMOL/L (ref 3.5–5.1)
POTASSIUM SERPL-SCNC: 4.2 MMOL/L (ref 3.5–5.1)
PROTHROMBIN TIME: 13.2 SEC (ref 9.6–12)
Q-T INTERVAL, ECG07: 398 MS
QRS DURATION, ECG06: 76 MS
QTC CALCULATION (BEZET), ECG08: 459 MS
RBC # BLD AUTO: 3.77 M/UL (ref 4.23–5.67)
RESP RATE: 15
SAO2 % BLD: 100 % (ref 95–98)
SAO2 % BLD: 99 % (ref 92–98.5)
SERVICE CMNT-IMP: ABNORMAL
SODIUM BLD-SCNC: 133 MMOL/L (ref 138–146)
SODIUM BLD-SCNC: 135 MMOL/L (ref 138–146)
SODIUM BLD-SCNC: 135 MMOL/L (ref 138–146)
SODIUM BLD-SCNC: 137 MMOL/L (ref 138–146)
SODIUM BLD-SCNC: 138 MMOL/L (ref 138–146)
SODIUM BLD-SCNC: 138 MMOL/L (ref 138–146)
SODIUM BLDA-SCNC: 137.4 MMOL/L (ref 135–148)
SODIUM SERPL-SCNC: 144 MMOL/L (ref 136–145)
VENTILATION MODE VENT: ABNORMAL
VENTRICULAR RATE, ECG03: 80 BPM
VT SETTING VENT: 500 ML
WBC # BLD AUTO: 4.7 K/UL (ref 4.3–11.1)

## 2017-07-31 PROCEDURE — 85027 COMPLETE CBC AUTOMATED: CPT | Performed by: THORACIC SURGERY (CARDIOTHORACIC VASCULAR SURGERY)

## 2017-07-31 PROCEDURE — B24BZZ4 ULTRASONOGRAPHY OF HEART WITH AORTA, TRANSESOPHAGEAL: ICD-10-PCS | Performed by: THORACIC SURGERY (CARDIOTHORACIC VASCULAR SURGERY)

## 2017-07-31 PROCEDURE — 77030005401 HC CATH RAD ARRO -A: Performed by: ANESTHESIOLOGY

## 2017-07-31 PROCEDURE — 82962 GLUCOSE BLOOD TEST: CPT

## 2017-07-31 PROCEDURE — 77030005537 HC CATH URETH BARD -A: Performed by: THORACIC SURGERY (CARDIOTHORACIC VASCULAR SURGERY)

## 2017-07-31 PROCEDURE — 85018 HEMOGLOBIN: CPT | Performed by: THORACIC SURGERY (CARDIOTHORACIC VASCULAR SURGERY)

## 2017-07-31 PROCEDURE — 74011250637 HC RX REV CODE- 250/637: Performed by: THORACIC SURGERY (CARDIOTHORACIC VASCULAR SURGERY)

## 2017-07-31 PROCEDURE — 76010000199 HC CV SURG 4.5 TO 5 HR INTENSV-TIER 1: Performed by: THORACIC SURGERY (CARDIOTHORACIC VASCULAR SURGERY)

## 2017-07-31 PROCEDURE — 77030002888 HC SUT CHRMC J&J -A: Performed by: THORACIC SURGERY (CARDIOTHORACIC VASCULAR SURGERY)

## 2017-07-31 PROCEDURE — 76010000155 HC AUTO TRANSFUSION/CELL SAVER: Performed by: THORACIC SURGERY (CARDIOTHORACIC VASCULAR SURGERY)

## 2017-07-31 PROCEDURE — 77030034927 HC PK PROC CPB INSPIRE PERF LIVA -F: Performed by: THORACIC SURGERY (CARDIOTHORACIC VASCULAR SURGERY)

## 2017-07-31 PROCEDURE — 77030018548 HC SUT ETHBND2 J&J -B: Performed by: THORACIC SURGERY (CARDIOTHORACIC VASCULAR SURGERY)

## 2017-07-31 PROCEDURE — 84132 ASSAY OF SERUM POTASSIUM: CPT | Performed by: THORACIC SURGERY (CARDIOTHORACIC VASCULAR SURGERY)

## 2017-07-31 PROCEDURE — 74011000250 HC RX REV CODE- 250

## 2017-07-31 PROCEDURE — 77030002970 HC SUT PLEDG TELE -A: Performed by: THORACIC SURGERY (CARDIOTHORACIC VASCULAR SURGERY)

## 2017-07-31 PROCEDURE — 77030002996 HC SUT SLK J&J -A: Performed by: THORACIC SURGERY (CARDIOTHORACIC VASCULAR SURGERY)

## 2017-07-31 PROCEDURE — 77030009355 HC CRDPLG DEL SET QUES -C: Performed by: THORACIC SURGERY (CARDIOTHORACIC VASCULAR SURGERY)

## 2017-07-31 PROCEDURE — 77030020751 HC FLTR TBNG TRNSFUS HAEM -A: Performed by: THORACIC SURGERY (CARDIOTHORACIC VASCULAR SURGERY)

## 2017-07-31 PROCEDURE — 77030018729 HC ELECTRD DEFIB PAD CARD -B: Performed by: THORACIC SURGERY (CARDIOTHORACIC VASCULAR SURGERY)

## 2017-07-31 PROCEDURE — 77030018834: Performed by: THORACIC SURGERY (CARDIOTHORACIC VASCULAR SURGERY)

## 2017-07-31 PROCEDURE — 77030016791 HC CATH CV SWN1 EDWD -C: Performed by: ANESTHESIOLOGY

## 2017-07-31 PROCEDURE — 5A1221Z PERFORMANCE OF CARDIAC OUTPUT, CONTINUOUS: ICD-10-PCS | Performed by: THORACIC SURGERY (CARDIOTHORACIC VASCULAR SURGERY)

## 2017-07-31 PROCEDURE — 83735 ASSAY OF MAGNESIUM: CPT | Performed by: THORACIC SURGERY (CARDIOTHORACIC VASCULAR SURGERY)

## 2017-07-31 PROCEDURE — 77030013838 HC OCCL INT FLRST BAXT -B: Performed by: THORACIC SURGERY (CARDIOTHORACIC VASCULAR SURGERY)

## 2017-07-31 PROCEDURE — 77030016688: Performed by: THORACIC SURGERY (CARDIOTHORACIC VASCULAR SURGERY)

## 2017-07-31 PROCEDURE — 76060000040 HC ANESTHESIA 4.5 TO 5 HR: Performed by: THORACIC SURGERY (CARDIOTHORACIC VASCULAR SURGERY)

## 2017-07-31 PROCEDURE — 02100Z9 BYPASS CORONARY ARTERY, ONE ARTERY FROM LEFT INTERNAL MAMMARY, OPEN APPROACH: ICD-10-PCS | Performed by: THORACIC SURGERY (CARDIOTHORACIC VASCULAR SURGERY)

## 2017-07-31 PROCEDURE — 77030006247 HC LD PCMKR MYOCRD BPLR TEMP MEDT -B: Performed by: THORACIC SURGERY (CARDIOTHORACIC VASCULAR SURGERY)

## 2017-07-31 PROCEDURE — 77030012890

## 2017-07-31 PROCEDURE — 77030018547 HC SUT ETHBND1 J&J -B: Performed by: THORACIC SURGERY (CARDIOTHORACIC VASCULAR SURGERY)

## 2017-07-31 PROCEDURE — 5A1223Z PERFORMANCE OF CARDIAC PACING, CONTINUOUS: ICD-10-PCS | Performed by: THORACIC SURGERY (CARDIOTHORACIC VASCULAR SURGERY)

## 2017-07-31 PROCEDURE — 77030006690 HC BLD OPHTH BVR BD -B: Performed by: THORACIC SURGERY (CARDIOTHORACIC VASCULAR SURGERY)

## 2017-07-31 PROCEDURE — 77030031139 HC SUT VCRL2 J&J -A: Performed by: THORACIC SURGERY (CARDIOTHORACIC VASCULAR SURGERY)

## 2017-07-31 PROCEDURE — 74011250636 HC RX REV CODE- 250/636

## 2017-07-31 PROCEDURE — 36600 WITHDRAWAL OF ARTERIAL BLOOD: CPT

## 2017-07-31 PROCEDURE — 85384 FIBRINOGEN ACTIVITY: CPT | Performed by: THORACIC SURGERY (CARDIOTHORACIC VASCULAR SURGERY)

## 2017-07-31 PROCEDURE — 93005 ELECTROCARDIOGRAM TRACING: CPT | Performed by: THORACIC SURGERY (CARDIOTHORACIC VASCULAR SURGERY)

## 2017-07-31 PROCEDURE — 77030018571 HC SUT PROL1 J&J -B: Performed by: THORACIC SURGERY (CARDIOTHORACIC VASCULAR SURGERY)

## 2017-07-31 PROCEDURE — 77030003010 HC SUT SURG STL J&J -B: Performed by: THORACIC SURGERY (CARDIOTHORACIC VASCULAR SURGERY)

## 2017-07-31 PROCEDURE — 77030015758: Performed by: THORACIC SURGERY (CARDIOTHORACIC VASCULAR SURGERY)

## 2017-07-31 PROCEDURE — 77030005424 HC CATH THOR GTNG -A: Performed by: THORACIC SURGERY (CARDIOTHORACIC VASCULAR SURGERY)

## 2017-07-31 PROCEDURE — 77030012793 HC CIRC VNTLTR FISP -B

## 2017-07-31 PROCEDURE — 82947 ASSAY GLUCOSE BLOOD QUANT: CPT

## 2017-07-31 PROCEDURE — 85730 THROMBOPLASTIN TIME PARTIAL: CPT | Performed by: THORACIC SURGERY (CARDIOTHORACIC VASCULAR SURGERY)

## 2017-07-31 PROCEDURE — 74011250636 HC RX REV CODE- 250/636: Performed by: THORACIC SURGERY (CARDIOTHORACIC VASCULAR SURGERY)

## 2017-07-31 PROCEDURE — 77010033711 HC HIGH FLOW OXYGEN

## 2017-07-31 PROCEDURE — 77030002986 HC SUT PROL J&J -A: Performed by: THORACIC SURGERY (CARDIOTHORACIC VASCULAR SURGERY)

## 2017-07-31 PROCEDURE — 77030005521 HC CATH URETH FOL38 BARD -B: Performed by: THORACIC SURGERY (CARDIOTHORACIC VASCULAR SURGERY)

## 2017-07-31 PROCEDURE — 77030013292 HC BOWL MX PRSM J&J -A: Performed by: ANESTHESIOLOGY

## 2017-07-31 PROCEDURE — 77030013797 HC KT TRNSDUC PRSSR EDWD -A: Performed by: THORACIC SURGERY (CARDIOTHORACIC VASCULAR SURGERY)

## 2017-07-31 PROCEDURE — 77030002987 HC SUT PROL J&J -B: Performed by: THORACIC SURGERY (CARDIOTHORACIC VASCULAR SURGERY)

## 2017-07-31 PROCEDURE — 77030034888 HC SUT PROL 2 J&J -B: Performed by: THORACIC SURGERY (CARDIOTHORACIC VASCULAR SURGERY)

## 2017-07-31 PROCEDURE — 77030002520 HC INSRT CLMP LATIS STLTH AMR -B: Performed by: THORACIC SURGERY (CARDIOTHORACIC VASCULAR SURGERY)

## 2017-07-31 PROCEDURE — C1751 CATH, INF, PER/CENT/MIDLINE: HCPCS | Performed by: ANESTHESIOLOGY

## 2017-07-31 PROCEDURE — 77030011640 HC PAD GRND REM COVD -A: Performed by: THORACIC SURGERY (CARDIOTHORACIC VASCULAR SURGERY)

## 2017-07-31 PROCEDURE — 86580 TB INTRADERMAL TEST: CPT | Performed by: THORACIC SURGERY (CARDIOTHORACIC VASCULAR SURGERY)

## 2017-07-31 PROCEDURE — 77030025827 HC BG BLD DNR AUTLG MEDT -A: Performed by: THORACIC SURGERY (CARDIOTHORACIC VASCULAR SURGERY)

## 2017-07-31 PROCEDURE — C1729 CATH, DRAINAGE: HCPCS | Performed by: THORACIC SURGERY (CARDIOTHORACIC VASCULAR SURGERY)

## 2017-07-31 PROCEDURE — 77030020751 HC FLTR TBNG TRNSFUS HAEM -A: Performed by: ANESTHESIOLOGY

## 2017-07-31 PROCEDURE — 77030010813: Performed by: THORACIC SURGERY (CARDIOTHORACIC VASCULAR SURGERY)

## 2017-07-31 PROCEDURE — 77030013794 HC KT TRNSDUC BLD EDWD -B: Performed by: ANESTHESIOLOGY

## 2017-07-31 PROCEDURE — 77030002912 HC SUT ETHBND J&J -A: Performed by: THORACIC SURGERY (CARDIOTHORACIC VASCULAR SURGERY)

## 2017-07-31 PROCEDURE — 77030008771 HC TU NG SALEM SUMP -A: Performed by: ANESTHESIOLOGY

## 2017-07-31 PROCEDURE — 65610000006 HC RM INTENSIVE CARE

## 2017-07-31 PROCEDURE — 77030013861 HC PNCH AORT CLNCUT QUES -B: Performed by: THORACIC SURGERY (CARDIOTHORACIC VASCULAR SURGERY)

## 2017-07-31 PROCEDURE — 74011000258 HC RX REV CODE- 258

## 2017-07-31 PROCEDURE — 74011250637 HC RX REV CODE- 250/637: Performed by: INTERNAL MEDICINE

## 2017-07-31 PROCEDURE — 77030002933 HC SUT MCRYL J&J -A: Performed by: THORACIC SURGERY (CARDIOTHORACIC VASCULAR SURGERY)

## 2017-07-31 PROCEDURE — 71010 XR CHEST SNGL V: CPT

## 2017-07-31 PROCEDURE — 74011250636 HC RX REV CODE- 250/636: Performed by: ANESTHESIOLOGY

## 2017-07-31 PROCEDURE — 85610 PROTHROMBIN TIME: CPT | Performed by: THORACIC SURGERY (CARDIOTHORACIC VASCULAR SURGERY)

## 2017-07-31 PROCEDURE — 80051 ELECTROLYTE PANEL: CPT

## 2017-07-31 PROCEDURE — 80048 BASIC METABOLIC PNL TOTAL CA: CPT | Performed by: THORACIC SURGERY (CARDIOTHORACIC VASCULAR SURGERY)

## 2017-07-31 PROCEDURE — C1769 GUIDE WIRE: HCPCS | Performed by: THORACIC SURGERY (CARDIOTHORACIC VASCULAR SURGERY)

## 2017-07-31 PROCEDURE — 77030008477 HC STYL SATN SLP COVD -A: Performed by: ANESTHESIOLOGY

## 2017-07-31 PROCEDURE — 77030016564 HC BLD STRNL SAW4 CNMD -B: Performed by: THORACIC SURGERY (CARDIOTHORACIC VASCULAR SURGERY)

## 2017-07-31 PROCEDURE — 77030019908 HC STETH ESOPH SIMS -A: Performed by: ANESTHESIOLOGY

## 2017-07-31 PROCEDURE — 77030025646 HC AUTOTRNSFUS KT TERU -C: Performed by: THORACIC SURGERY (CARDIOTHORACIC VASCULAR SURGERY)

## 2017-07-31 PROCEDURE — 021009W BYPASS CORONARY ARTERY, ONE ARTERY FROM AORTA WITH AUTOLOGOUS VENOUS TISSUE, OPEN APPROACH: ICD-10-PCS | Performed by: THORACIC SURGERY (CARDIOTHORACIC VASCULAR SURGERY)

## 2017-07-31 PROCEDURE — 99254 IP/OBS CNSLTJ NEW/EST MOD 60: CPT | Performed by: INTERNAL MEDICINE

## 2017-07-31 PROCEDURE — 77030021668 HC NEB PREFIL KT VYRM -A

## 2017-07-31 PROCEDURE — 74011000302 HC RX REV CODE- 302: Performed by: THORACIC SURGERY (CARDIOTHORACIC VASCULAR SURGERY)

## 2017-07-31 PROCEDURE — 82803 BLOOD GASES ANY COMBINATION: CPT

## 2017-07-31 PROCEDURE — 77030010939 HC CLP LIG TELE -B: Performed by: THORACIC SURGERY (CARDIOTHORACIC VASCULAR SURGERY)

## 2017-07-31 PROCEDURE — P9047 ALBUMIN (HUMAN), 25%, 50ML: HCPCS

## 2017-07-31 PROCEDURE — 3E080GC INTRODUCTION OF OTHER THERAPEUTIC SUBSTANCE INTO HEART, OPEN APPROACH: ICD-10-PCS | Performed by: THORACIC SURGERY (CARDIOTHORACIC VASCULAR SURGERY)

## 2017-07-31 PROCEDURE — 77030018836 HC SOL IRR NACL ICUM -A: Performed by: THORACIC SURGERY (CARDIOTHORACIC VASCULAR SURGERY)

## 2017-07-31 PROCEDURE — 06BQ0ZZ EXCISION OF LEFT SAPHENOUS VEIN, OPEN APPROACH: ICD-10-PCS | Performed by: THORACIC SURGERY (CARDIOTHORACIC VASCULAR SURGERY)

## 2017-07-31 PROCEDURE — 77030008703 HC TU ET UNCUF COVD -A: Performed by: ANESTHESIOLOGY

## 2017-07-31 RX ORDER — CEFAZOLIN SODIUM IN 0.9 % NACL 2 G/50 ML
2 INTRAVENOUS SOLUTION, PIGGYBACK (ML) INTRAVENOUS EVERY 8 HOURS
Status: COMPLETED | OUTPATIENT
Start: 2017-07-31 | End: 2017-08-01

## 2017-07-31 RX ORDER — SODIUM CHLORIDE, SODIUM LACTATE, POTASSIUM CHLORIDE, CALCIUM CHLORIDE 600; 310; 30; 20 MG/100ML; MG/100ML; MG/100ML; MG/100ML
75 INJECTION, SOLUTION INTRAVENOUS CONTINUOUS
Status: DISCONTINUED | OUTPATIENT
Start: 2017-07-31 | End: 2017-07-31 | Stop reason: HOSPADM

## 2017-07-31 RX ORDER — MAGNESIUM SULFATE 1 G/100ML
1 INJECTION INTRAVENOUS AS NEEDED
Status: DISCONTINUED | OUTPATIENT
Start: 2017-07-31 | End: 2017-08-01

## 2017-07-31 RX ORDER — SODIUM CHLORIDE, SODIUM LACTATE, POTASSIUM CHLORIDE, CALCIUM CHLORIDE 600; 310; 30; 20 MG/100ML; MG/100ML; MG/100ML; MG/100ML
INJECTION, SOLUTION INTRAVENOUS
Status: DISCONTINUED | OUTPATIENT
Start: 2017-07-31 | End: 2017-07-31 | Stop reason: HOSPADM

## 2017-07-31 RX ORDER — VECURONIUM BROMIDE FOR INJECTION 1 MG/ML
INJECTION, POWDER, LYOPHILIZED, FOR SOLUTION INTRAVENOUS AS NEEDED
Status: DISCONTINUED | OUTPATIENT
Start: 2017-07-31 | End: 2017-07-31 | Stop reason: HOSPADM

## 2017-07-31 RX ORDER — SODIUM CHLORIDE 9 MG/ML
25 INJECTION, SOLUTION INTRAVENOUS CONTINUOUS
Status: DISCONTINUED | OUTPATIENT
Start: 2017-07-31 | End: 2017-08-01

## 2017-07-31 RX ORDER — SODIUM CHLORIDE 9 MG/ML
INJECTION, SOLUTION INTRAVENOUS
Status: DISCONTINUED | OUTPATIENT
Start: 2017-07-31 | End: 2017-07-31 | Stop reason: HOSPADM

## 2017-07-31 RX ORDER — CHLORHEXIDINE GLUCONATE 1.2 MG/ML
10 RINSE ORAL 2 TIMES DAILY
Status: DISCONTINUED | OUTPATIENT
Start: 2017-07-31 | End: 2017-08-01

## 2017-07-31 RX ORDER — CEFAZOLIN SODIUM 1 G/3ML
INJECTION, POWDER, FOR SOLUTION INTRAMUSCULAR; INTRAVENOUS AS NEEDED
Status: DISCONTINUED | OUTPATIENT
Start: 2017-07-31 | End: 2017-07-31 | Stop reason: HOSPADM

## 2017-07-31 RX ORDER — CEFAZOLIN SODIUM IN 0.9 % NACL 2 G/50 ML
2 INTRAVENOUS SOLUTION, PIGGYBACK (ML) INTRAVENOUS ONCE
Status: COMPLETED | OUTPATIENT
Start: 2017-07-31 | End: 2017-07-31

## 2017-07-31 RX ORDER — PROPOFOL 10 MG/ML
INJECTION, EMULSION INTRAVENOUS AS NEEDED
Status: DISCONTINUED | OUTPATIENT
Start: 2017-07-31 | End: 2017-07-31 | Stop reason: HOSPADM

## 2017-07-31 RX ORDER — MIDAZOLAM HYDROCHLORIDE 1 MG/ML
INJECTION, SOLUTION INTRAMUSCULAR; INTRAVENOUS AS NEEDED
Status: DISCONTINUED | OUTPATIENT
Start: 2017-07-31 | End: 2017-07-31 | Stop reason: HOSPADM

## 2017-07-31 RX ORDER — SIMVASTATIN 20 MG/1
20 TABLET, FILM COATED ORAL
Status: DISCONTINUED | OUTPATIENT
Start: 2017-07-31 | End: 2017-08-01 | Stop reason: SDUPTHER

## 2017-07-31 RX ORDER — MUPIROCIN 20 MG/G
OINTMENT TOPICAL 2 TIMES DAILY
Status: DISCONTINUED | OUTPATIENT
Start: 2017-07-31 | End: 2017-08-01

## 2017-07-31 RX ORDER — DEXTROSE, SODIUM CHLORIDE, AND POTASSIUM CHLORIDE 5; .45; .15 G/100ML; G/100ML; G/100ML
25 INJECTION INTRAVENOUS CONTINUOUS
Status: DISCONTINUED | OUTPATIENT
Start: 2017-07-31 | End: 2017-08-01

## 2017-07-31 RX ORDER — MUPIROCIN 20 MG/G
OINTMENT TOPICAL ONCE
Status: COMPLETED | OUTPATIENT
Start: 2017-07-31 | End: 2017-07-31

## 2017-07-31 RX ORDER — POTASSIUM CHLORIDE 14.9 MG/ML
10 INJECTION INTRAVENOUS AS NEEDED
Status: DISCONTINUED | OUTPATIENT
Start: 2017-07-31 | End: 2017-08-01

## 2017-07-31 RX ORDER — GUAIFENESIN 100 MG/5ML
81 LIQUID (ML) ORAL DAILY
Status: DISCONTINUED | OUTPATIENT
Start: 2017-08-01 | End: 2017-08-03 | Stop reason: HOSPADM

## 2017-07-31 RX ORDER — LIDOCAINE HYDROCHLORIDE 10 MG/ML
0.1 INJECTION INFILTRATION; PERINEURAL AS NEEDED
Status: DISCONTINUED | OUTPATIENT
Start: 2017-07-31 | End: 2017-07-31 | Stop reason: HOSPADM

## 2017-07-31 RX ORDER — PROTAMINE SULFATE 10 MG/ML
INJECTION, SOLUTION INTRAVENOUS AS NEEDED
Status: DISCONTINUED | OUTPATIENT
Start: 2017-07-31 | End: 2017-07-31 | Stop reason: HOSPADM

## 2017-07-31 RX ORDER — DEXTROSE 50 % IN WATER (D50W) INTRAVENOUS SYRINGE
25 AS NEEDED
Status: DISCONTINUED | OUTPATIENT
Start: 2017-07-31 | End: 2017-08-01

## 2017-07-31 RX ORDER — AMIODARONE HYDROCHLORIDE 200 MG/1
200 TABLET ORAL EVERY 12 HOURS
Status: DISCONTINUED | OUTPATIENT
Start: 2017-07-31 | End: 2017-08-03 | Stop reason: HOSPADM

## 2017-07-31 RX ORDER — NITROGLYCERIN 20 MG/100ML
INJECTION INTRAVENOUS
Status: DISCONTINUED | OUTPATIENT
Start: 2017-07-31 | End: 2017-07-31 | Stop reason: HOSPADM

## 2017-07-31 RX ORDER — MIDAZOLAM HYDROCHLORIDE 1 MG/ML
2 INJECTION, SOLUTION INTRAMUSCULAR; INTRAVENOUS ONCE
Status: DISCONTINUED | OUTPATIENT
Start: 2017-07-31 | End: 2017-07-31 | Stop reason: HOSPADM

## 2017-07-31 RX ORDER — SIMVASTATIN 20 MG/1
80 TABLET, FILM COATED ORAL
Status: DISCONTINUED | OUTPATIENT
Start: 2017-07-31 | End: 2017-07-31

## 2017-07-31 RX ORDER — LIDOCAINE HCL/PF 100 MG/5ML
50-100 SYRINGE (ML) INTRAVENOUS
Status: DISCONTINUED | OUTPATIENT
Start: 2017-07-31 | End: 2017-08-01

## 2017-07-31 RX ORDER — METOPROLOL TARTRATE 25 MG/1
25 TABLET, FILM COATED ORAL EVERY 12 HOURS
Status: DISCONTINUED | OUTPATIENT
Start: 2017-08-01 | End: 2017-08-01

## 2017-07-31 RX ORDER — MORPHINE SULFATE 10 MG/ML
INJECTION, SOLUTION INTRAMUSCULAR; INTRAVENOUS AS NEEDED
Status: DISCONTINUED | OUTPATIENT
Start: 2017-07-31 | End: 2017-07-31 | Stop reason: HOSPADM

## 2017-07-31 RX ORDER — OXYCODONE AND ACETAMINOPHEN 5; 325 MG/1; MG/1
1 TABLET ORAL
Status: DISCONTINUED | OUTPATIENT
Start: 2017-07-31 | End: 2017-08-01

## 2017-07-31 RX ORDER — PROPOFOL 10 MG/ML
INJECTION, EMULSION INTRAVENOUS
Status: DISCONTINUED | OUTPATIENT
Start: 2017-07-31 | End: 2017-07-31 | Stop reason: HOSPADM

## 2017-07-31 RX ORDER — NALOXONE HYDROCHLORIDE 0.4 MG/ML
0.4 INJECTION, SOLUTION INTRAMUSCULAR; INTRAVENOUS; SUBCUTANEOUS AS NEEDED
Status: DISCONTINUED | OUTPATIENT
Start: 2017-07-31 | End: 2017-08-01

## 2017-07-31 RX ORDER — MORPHINE SULFATE 10 MG/ML
3-5 INJECTION, SOLUTION INTRAMUSCULAR; INTRAVENOUS
Status: DISCONTINUED | OUTPATIENT
Start: 2017-07-31 | End: 2017-08-01

## 2017-07-31 RX ORDER — SODIUM CHLORIDE 0.9 % (FLUSH) 0.9 %
5-10 SYRINGE (ML) INJECTION AS NEEDED
Status: DISCONTINUED | OUTPATIENT
Start: 2017-07-31 | End: 2017-08-01

## 2017-07-31 RX ORDER — MIDAZOLAM HYDROCHLORIDE 1 MG/ML
1 INJECTION, SOLUTION INTRAMUSCULAR; INTRAVENOUS
Status: DISCONTINUED | OUTPATIENT
Start: 2017-07-31 | End: 2017-08-01

## 2017-07-31 RX ORDER — MIDAZOLAM HYDROCHLORIDE 1 MG/ML
2 INJECTION, SOLUTION INTRAMUSCULAR; INTRAVENOUS
Status: DISCONTINUED | OUTPATIENT
Start: 2017-07-31 | End: 2017-07-31 | Stop reason: HOSPADM

## 2017-07-31 RX ORDER — GLYCOPYRROLATE 0.2 MG/ML
INJECTION INTRAMUSCULAR; INTRAVENOUS AS NEEDED
Status: DISCONTINUED | OUTPATIENT
Start: 2017-07-31 | End: 2017-07-31 | Stop reason: HOSPADM

## 2017-07-31 RX ORDER — NEOSTIGMINE METHYLSULFATE 1 MG/ML
INJECTION, SOLUTION INTRAVENOUS AS NEEDED
Status: DISCONTINUED | OUTPATIENT
Start: 2017-07-31 | End: 2017-07-31 | Stop reason: HOSPADM

## 2017-07-31 RX ORDER — SUFENTANIL CITRATE 50 UG/ML
INJECTION EPIDURAL; INTRAVENOUS AS NEEDED
Status: DISCONTINUED | OUTPATIENT
Start: 2017-07-31 | End: 2017-07-31 | Stop reason: HOSPADM

## 2017-07-31 RX ORDER — SODIUM CHLORIDE 0.9 % (FLUSH) 0.9 %
5-10 SYRINGE (ML) INJECTION EVERY 8 HOURS
Status: DISCONTINUED | OUTPATIENT
Start: 2017-07-31 | End: 2017-08-01

## 2017-07-31 RX ORDER — PAPAVERINE HYDROCHLORIDE 30 MG/ML
INJECTION INTRAMUSCULAR; INTRAVENOUS AS NEEDED
Status: DISCONTINUED | OUTPATIENT
Start: 2017-07-31 | End: 2017-07-31 | Stop reason: HOSPADM

## 2017-07-31 RX ORDER — NITROGLYCERIN 20 MG/100ML
10-100 INJECTION INTRAVENOUS
Status: DISCONTINUED | OUTPATIENT
Start: 2017-07-31 | End: 2017-08-01

## 2017-07-31 RX ORDER — SODIUM BICARBONATE 1 MEQ/ML
50 SYRINGE (ML) INTRAVENOUS AS NEEDED
Status: DISCONTINUED | OUTPATIENT
Start: 2017-07-31 | End: 2017-08-01

## 2017-07-31 RX ORDER — HEPARIN SODIUM 1000 [USP'U]/ML
INJECTION, SOLUTION INTRAVENOUS; SUBCUTANEOUS AS NEEDED
Status: DISCONTINUED | OUTPATIENT
Start: 2017-07-31 | End: 2017-07-31 | Stop reason: HOSPADM

## 2017-07-31 RX ORDER — FENTANYL CITRATE 50 UG/ML
100 INJECTION, SOLUTION INTRAMUSCULAR; INTRAVENOUS ONCE
Status: COMPLETED | OUTPATIENT
Start: 2017-07-31 | End: 2017-07-31

## 2017-07-31 RX ORDER — ROCURONIUM BROMIDE 10 MG/ML
INJECTION, SOLUTION INTRAVENOUS AS NEEDED
Status: DISCONTINUED | OUTPATIENT
Start: 2017-07-31 | End: 2017-07-31 | Stop reason: HOSPADM

## 2017-07-31 RX ORDER — ASPIRIN 81 MG/1
81 TABLET ORAL
Status: DISCONTINUED | OUTPATIENT
Start: 2017-08-01 | End: 2017-07-31

## 2017-07-31 RX ORDER — AMIODARONE HYDROCHLORIDE 200 MG/1
400 TABLET ORAL ONCE
Status: COMPLETED | OUTPATIENT
Start: 2017-07-31 | End: 2017-07-31

## 2017-07-31 RX ADMIN — SODIUM CHLORIDE 25 ML/HR: 900 INJECTION, SOLUTION INTRAVENOUS at 14:44

## 2017-07-31 RX ADMIN — MIDAZOLAM HYDROCHLORIDE 5 MG: 1 INJECTION, SOLUTION INTRAMUSCULAR; INTRAVENOUS at 12:20

## 2017-07-31 RX ADMIN — CEFAZOLIN SODIUM 2 G: 1 INJECTION, POWDER, FOR SOLUTION INTRAMUSCULAR; INTRAVENOUS at 13:15

## 2017-07-31 RX ADMIN — HEPARIN SODIUM 25000 UNITS: 1000 INJECTION, SOLUTION INTRAVENOUS; SUBCUTANEOUS at 11:07

## 2017-07-31 RX ADMIN — MORPHINE SULFATE 3 MG: 10 INJECTION INTRAMUSCULAR; INTRAVENOUS; SUBCUTANEOUS at 15:07

## 2017-07-31 RX ADMIN — MIDAZOLAM HYDROCHLORIDE 1 MG: 1 INJECTION, SOLUTION INTRAMUSCULAR; INTRAVENOUS at 09:35

## 2017-07-31 RX ADMIN — ROCURONIUM BROMIDE 50 MG: 10 INJECTION, SOLUTION INTRAVENOUS at 12:20

## 2017-07-31 RX ADMIN — SUFENTANIL CITRATE 50 MCG: 50 INJECTION EPIDURAL; INTRAVENOUS at 11:23

## 2017-07-31 RX ADMIN — SODIUM CHLORIDE: 9 INJECTION, SOLUTION INTRAVENOUS at 09:46

## 2017-07-31 RX ADMIN — VECURONIUM BROMIDE FOR INJECTION 5 MG: 1 INJECTION, POWDER, LYOPHILIZED, FOR SOLUTION INTRAVENOUS at 11:45

## 2017-07-31 RX ADMIN — FENTANYL CITRATE 250 MCG: 50 INJECTION, SOLUTION INTRAMUSCULAR; INTRAVENOUS at 10:25

## 2017-07-31 RX ADMIN — GLYCOPYRROLATE 0.4 MG: 0.2 INJECTION INTRAMUSCULAR; INTRAVENOUS at 14:08

## 2017-07-31 RX ADMIN — SUFENTANIL CITRATE 50 MCG: 50 INJECTION EPIDURAL; INTRAVENOUS at 10:31

## 2017-07-31 RX ADMIN — OXYCODONE HYDROCHLORIDE AND ACETAMINOPHEN 1 TABLET: 5; 325 TABLET ORAL at 20:13

## 2017-07-31 RX ADMIN — MIDAZOLAM HYDROCHLORIDE 2 MG: 1 INJECTION, SOLUTION INTRAMUSCULAR; INTRAVENOUS at 09:26

## 2017-07-31 RX ADMIN — SODIUM CHLORIDE, SODIUM LACTATE, POTASSIUM CHLORIDE, AND CALCIUM CHLORIDE 75 ML/HR: 600; 310; 30; 20 INJECTION, SOLUTION INTRAVENOUS at 08:06

## 2017-07-31 RX ADMIN — MIDAZOLAM HYDROCHLORIDE 3 MG: 1 INJECTION, SOLUTION INTRAMUSCULAR; INTRAVENOUS at 11:19

## 2017-07-31 RX ADMIN — SODIUM CHLORIDE, SODIUM LACTATE, POTASSIUM CHLORIDE, CALCIUM CHLORIDE: 600; 310; 30; 20 INJECTION, SOLUTION INTRAVENOUS at 09:26

## 2017-07-31 RX ADMIN — VECURONIUM BROMIDE FOR INJECTION 5 MG: 1 INJECTION, POWDER, LYOPHILIZED, FOR SOLUTION INTRAVENOUS at 11:19

## 2017-07-31 RX ADMIN — SIMVASTATIN 20 MG: 20 TABLET, FILM COATED ORAL at 21:54

## 2017-07-31 RX ADMIN — SUFENTANIL CITRATE 50 MCG: 50 INJECTION EPIDURAL; INTRAVENOUS at 11:12

## 2017-07-31 RX ADMIN — MIDAZOLAM HYDROCHLORIDE 1 MG: 1 INJECTION, SOLUTION INTRAMUSCULAR; INTRAVENOUS at 09:49

## 2017-07-31 RX ADMIN — ROCURONIUM BROMIDE 50 MG: 10 INJECTION, SOLUTION INTRAVENOUS at 09:49

## 2017-07-31 RX ADMIN — DEXTROSE MONOHYDRATE, SODIUM CHLORIDE, AND POTASSIUM CHLORIDE 25 ML/HR: 50; 4.5; 1.49 INJECTION, SOLUTION INTRAVENOUS at 14:45

## 2017-07-31 RX ADMIN — MUPIROCIN: 20 OINTMENT TOPICAL at 20:15

## 2017-07-31 RX ADMIN — VECURONIUM BROMIDE FOR INJECTION 5 MG: 1 INJECTION, POWDER, LYOPHILIZED, FOR SOLUTION INTRAVENOUS at 10:19

## 2017-07-31 RX ADMIN — AMIODARONE HYDROCHLORIDE 200 MG: 200 TABLET ORAL at 20:13

## 2017-07-31 RX ADMIN — NITROGLYCERIN 10 MCG/MIN: 20 INJECTION INTRAVENOUS at 09:56

## 2017-07-31 RX ADMIN — Medication 10 ML: at 22:00

## 2017-07-31 RX ADMIN — FENTANYL CITRATE 250 MCG: 50 INJECTION, SOLUTION INTRAMUSCULAR; INTRAVENOUS at 10:27

## 2017-07-31 RX ADMIN — MORPHINE SULFATE 2 MG: 10 INJECTION, SOLUTION INTRAMUSCULAR; INTRAVENOUS at 13:25

## 2017-07-31 RX ADMIN — SODIUM CHLORIDE, SODIUM LACTATE, POTASSIUM CHLORIDE, CALCIUM CHLORIDE: 600; 310; 30; 20 INJECTION, SOLUTION INTRAVENOUS at 09:46

## 2017-07-31 RX ADMIN — PROPOFOL 50 MG: 10 INJECTION, EMULSION INTRAVENOUS at 09:49

## 2017-07-31 RX ADMIN — MIDAZOLAM HYDROCHLORIDE 1 MG: 1 INJECTION, SOLUTION INTRAMUSCULAR; INTRAVENOUS at 09:30

## 2017-07-31 RX ADMIN — CEFAZOLIN 2 G: 1 INJECTION, POWDER, FOR SOLUTION INTRAMUSCULAR; INTRAVENOUS; PARENTERAL at 09:56

## 2017-07-31 RX ADMIN — TUBERCULIN PURIFIED PROTEIN DERIVATIVE 5 UNITS: 5 INJECTION INTRADERMAL at 21:54

## 2017-07-31 RX ADMIN — CHLORHEXIDINE GLUCONATE 10 ML: 1.2 RINSE ORAL at 14:45

## 2017-07-31 RX ADMIN — Medication 10 ML: at 14:49

## 2017-07-31 RX ADMIN — MIDAZOLAM HYDROCHLORIDE 2 MG: 1 INJECTION, SOLUTION INTRAMUSCULAR; INTRAVENOUS at 10:25

## 2017-07-31 RX ADMIN — MORPHINE SULFATE 2 MG: 10 INJECTION, SOLUTION INTRAMUSCULAR; INTRAVENOUS at 13:24

## 2017-07-31 RX ADMIN — CEFAZOLIN 2 G: 1 INJECTION, POWDER, FOR SOLUTION INTRAMUSCULAR; INTRAVENOUS; PARENTERAL at 20:07

## 2017-07-31 RX ADMIN — PROPOFOL 15 MCG/KG/MIN: 10 INJECTION, EMULSION INTRAVENOUS at 13:41

## 2017-07-31 RX ADMIN — MUPIROCIN: 20 OINTMENT TOPICAL at 08:00

## 2017-07-31 RX ADMIN — PROTAMINE SULFATE 225 MG: 10 INJECTION, SOLUTION INTRAVENOUS at 13:11

## 2017-07-31 RX ADMIN — NEOSTIGMINE METHYLSULFATE 3 MG: 1 INJECTION, SOLUTION INTRAVENOUS at 14:08

## 2017-07-31 RX ADMIN — FENTANYL CITRATE 500 MCG: 50 INJECTION, SOLUTION INTRAMUSCULAR; INTRAVENOUS at 09:49

## 2017-07-31 RX ADMIN — AMIODARONE HYDROCHLORIDE 400 MG: 200 TABLET ORAL at 08:06

## 2017-07-31 NOTE — CONSULTS
Cardiovascular ICU Consult Note: 7/31/2017  Anders Samson  Admission Date: 7/31/2017     The patient's chart is reviewed and the patient is discussed with the staff. Subjective:     Patient is seen at the request of Dr. Hermann Becker for respiratory management status post cardiac surgery. Patient had CABG. He is currently is sedated in CV-ICU and orally intubated receiving mechanical ventilation. He was recently evaluated by cardiology after his family noticed fatigue and dyspnea with exertion. He was found to have a calcium score of > 1000 and the cardiac cath revealed 2 vessel disease with a normal EF. Surgery was recommended. He smoked for about 20 years before quitting in 1991. He works full time with chemicals and his family reports that he is not always compliant with his protective equipment. He is currently om IV ICU ,on vent sedated. Prior to Admission Medications   Prescriptions Last Dose Informant Patient Reported? Taking? AMIODARONE HCL (AMIODARONE PO) 7/30/2017 at 1600  Yes Yes   Sig: Take 600 mg by mouth once. Per dr Jia Ulrich's orders take after 1600 day prior to surgery. METOPROLOL TARTRATE (LOPRESSOR PO) 7/30/2017 at 1600  Yes Yes   Sig: Take 12.5 mg by mouth once. Per dr Jia Ulrich's orders take after 1600 day prior to surgery   OMEGA-3 FATTY ACIDS/FISH OIL (FISH OIL EXTRA STRENGTH PO) 7/29/2017  Yes No   Sig: Take 2,400 mg by mouth daily. Stop seven days prior to surgery per anesthesia protocol. aspirin delayed-release 81 mg tablet 7/31/2017 at 0600  Yes Yes   Sig: Take 81 mg by mouth every morning. Take day of surgery per anesthesia protocol. b complex vitamins (B COMPLEX 1) tablet 7/29/2017  Yes No   Sig: Take 1 Tab by mouth daily. Stop seven days prior to surgery per anesthesia protocol. benazepril (LOTENSIN) 40 mg tablet 7/29/2017  Yes No   Sig: Take 40 mg by mouth every morning.  Indications: hypertension   calcium carbonate (CALTREX) 600 mg calcium (1,500 mg) tablet 7/29/2017  Yes No   Sig: Take 600 mg by mouth daily. Stop seven days prior to surgery per anesthesia protocol. multivitamin (ONE A DAY) tablet 7/29/2017  Yes No   Sig: Take 1 Tab by mouth daily. Stop seven days prior to surgery per anesthesia protocol. mupirocin calcium (BACTROBAN) 2 % nasal ointment 7/31/2017 at 0600  Yes Yes   Sig: by Both Nostrils route two (2) times a day. Per Dr Leyda Astudillo orders   simvastatin (ZOCOR) 80 mg tablet 7/30/2017 at Unknown time  Yes Yes   Sig: Take 80 mg by mouth every morning. Take day of surgery per anesthesia protocol.   Indications: hypercholesterolemia      Facility-Administered Medications: None       Review of Systems  A comprehensive review of systems was negative except for: Constitutional: positive for fatigue  Eyes: positive for none  Ears, nose, mouth, throat, and face: positive for none  Respiratory: positive for none  Cardiovascular: positive for fatigue, dyspnea on exertion  Gastrointestinal: positive for none  Genitourinary: positive for none  Integument/breast: positive for none  Hematologic/lymphatic: positive for none  Musculoskeletal: positive for none  Neurological: positive for none  Behvioral/Psych: positive for none  Endocrine: positive for none  Allergic/Immunologic: positive for none    Past Medical History:   Diagnosis Date    CAD (coronary artery disease)     scheduled to have CABG 7-31-17    Hypercholesterolemia     Hypertension     Leukopenia     pt reports \"normal for him, never been a problem\"     Rheumatic fever      Past Surgical History:   Procedure Laterality Date    HX COLONOSCOPY      HX ENDOSCOPY      HX HEART CATHETERIZATION      HX ORTHOPAEDIC      rt shoulder/rt hand    HX TONSILLECTOMY       Social History     Social History    Marital status:      Spouse name: N/A    Number of children: N/A    Years of education: N/A     Occupational History          Social History Main Topics    Smoking status: Former Smoker     Packs/day: 1.00     Years: 20.00     Quit date: 7/10/1991    Smokeless tobacco: Never Used      Comment: x 20 yrs ago    Alcohol use 12.6 oz/week     21 Glasses of wine per week    Drug use: No    Sexual activity: Not on file     Other Topics Concern    Not on file     Social History Narrative    . Lives with wife.  His daughter is an ICU nurse at [address] [place] [address]     Family History   Problem Relation Age of Onset    Hypertension Mother     Heart Disease Father      No Known Allergies    Current Facility-Administered Medications   Medication Dose Route Frequency    0.9% sodium chloride infusion  25 mL/hr IntraVENous CONTINUOUS    dextrose 5% - 0.45% NaCl with KCl 20 mEq/L infusion  25 mL/hr IntraVENous CONTINUOUS    sodium chloride (NS) flush 5-10 mL  5-10 mL IntraVENous Q8H    sodium chloride (NS) flush 5-10 mL  5-10 mL IntraVENous PRN    oxyCODONE-acetaminophen (PERCOCET) 5-325 mg per tablet 1 Tab  1 Tab Oral Q4H PRN    morphine injection 3-5 mg  3-5 mg IntraVENous Q1H PRN    naloxone (NARCAN) injection 0.4 mg  0.4 mg IntraVENous PRN    mupirocin (BACTROBAN) 2 % ointment   Both Nostrils BID    ceFAZolin in 0.9% NS (ANCEF) IVPB soln 2 g  2 g IntraVENous Q8H    sodium bicarbonate 8.4 % (1 mEq/mL) injection 50 mEq  50 mEq IntraVENous PRN    EPINEPHrine (ADRENALIN) 4,000 mcg in 0.9% sodium chloride 250 mL infusion  0.05-0.1 mcg/kg/min IntraVENous TITRATE    nitroglycerin (Tridil) 200 mcg/ml infusion   mcg/min IntraVENous TITRATE    PHENYLephrine (NEOSYNEPHRINE) 30,000 mcg in 0.9% sodium chloride 250 mL infusion   mcg/min IntraVENous TITRATE    lidocaine (PF) (XYLOCAINE) 100 mg/5 mL (2 %) injection syringe  mg   mg IntraVENous ONCE PRN    amiodarone (CORDARONE) tablet 200 mg  200 mg Oral Q12H    [START ON 8/1/2017] metoprolol tartrate (LOPRESSOR) tablet 25 mg  25 mg Oral Q12H    insulin regular (NOVOLIN R, HUMULIN R) 100 Units in 0.9% sodium chloride 100 mL infusion  1 Units/hr IntraVENous TITRATE    dextrose (D50W) injection syrg 12.5 g  25 mL IntraVENous PRN    [START ON 8/1/2017] aspirin chewable tablet 81 mg  81 mg Oral DAILY    magnesium sulfate 1 g/100 ml IVPB (premix or compounded)  1 g IntraVENous PRN    potassium chloride 10 mEq in 50 ml IVPB  10 mEq IntraVENous PRN    midazolam (VERSED) injection 1 mg  1 mg IntraVENous Q1H PRN    chlorhexidine (PERIDEX) 0.12 % mouthwash 10 mL  10 mL Oral BID    tuberculin injection 5 Units  5 Units IntraDERMal ONCE    simvastatin (ZOCOR) tablet 20 mg  20 mg Oral QHS     Facility-Administered Medications Ordered in Other Encounters   Medication Dose Route Frequency    lactated Ringers infusion    CONTINUOUS    midazolam (VERSED) injection    PRN    aminocaproic acid (AMICAR) 5 g in dextrose 5% 250 mL infusion  5 g IntraVENous CONTINUOUS    nitroglycerin (Tridil) 200 mcg/ml infusion    CONTINUOUS    lactated Ringers infusion   IntraVENous CONTINUOUS    0.9% sodium chloride infusion    CONTINUOUS    rocuronium (ZEMURON) injection   IntraVENous PRN    propofol (DIPRIVAN) 10 mg/mL injection    PRN    vecuronium (NORCURON) injection   IntraVENous PRN    aminocaproic acid (AMICAR) 5 g in 0.9% sodium chloride 250 mL infusion  5 g IntraVENous CONTINUOUS    SUFentanil (SUFENTA) injection   IntraVENous PRN    heparin (porcine) 1,000 unit/mL injection    PRN    EPINEPHrine (ADRENALIN) 4,000 mcg in 0.9% sodium chloride 250 mL infusion  4,000 mcg IntraVENous CONTINUOUS    protamine injection    PRN    ceFAZolin (ANCEF) injection   IntraVENous PRN    morphine injection    PRN    PHENYLephrine (NEOSYNEPHRINE) 30,000 mcg in 0.9% sodium chloride 250 mL infusion  30,000 mcg IntraVENous CONTINUOUS    propofol (DIPRIVAN) 10 mg/mL injection    CONTINUOUS    0.9% sodium chloride infusion 250 mL  250 mL IntraVENous PRN         Objective:     Vitals:    07/31/17 0805 07/31/17 1402 07/31/17 1403 07/31/17 1411   BP:       Pulse:  84 83 79   Resp:  15 14 17   Temp:   97.1 °F (36.2 °C)    SpO2: 99% 100% 100% 100%   Weight:       Height:           Intake and Output:      07/31 0701 - 07/31 1900  In: 1330 [I.V.:800]  Out: 921 [Urine:906]    Physical Exam:          Constitutional:  Sedated, intubated and mechanically ventilated. HEENT:  Sclera clear, pupils equal, oral mucosa moist and orally intubated  RESPIRATORY: clear  CARDIOVASCULAR:  RRR with no M,G,R;  ABDOMEN:  soft; no bowel sounds present  EXTREMITIES:  warm with no cyanosis, no lower leg edema. [place] site L leg with ace wrap. SKIN:  no jaundice or ecchymosis   NEURO:  sedated. Musculoskeletal: cannot assess - sedated    CHEST XRAY:        LINES:  Naik, swan emanuel, arterial line.  Oral ETT, oral gastric tube, chest tube(s) x 2     DRIPS:  NTG, Levo  gtt     CI:  2.9     Ventilator Settings  Mode FIO2 Rate Tidal Volume Pressure PEEP   PRVC  59 % (weaned to 40 post abg)          8 cm H20      Peak airway pressure: 19.9 cm H2O   Minute ventilation: 7.3 l/min         Assessment and Plan :  (Medical Decision Making)     Hospital Problems  Date Reviewed: 7/31/2017          Codes Class Noted POA    * (Principal)CAD (coronary artery disease) ICD-10-CM: I25.10  ICD-9-CM: 414.00  7/31/2017 Yes        Respiratory failure, post-operative ([facility] [address] ICD-10-CM: S04.059  ICD-9-CM: 518.51  7/31/2017 No        Hypoxia ICD-10-CM: R09.02  ICD-9-CM: 799.02  7/31/2017 No        S/P CABG (coronary artery bypass graft) ICD-10-CM: Z95.1  ICD-9-CM: V45.81  7/31/2017 Unknown              Plan:              - review CXR once done  - extubate per protocol once awake   -  IS,airvo once extubated   -  Bhaskar Khan MD    More than 50% of time documented was spent face-to-face contact with the patient and in the care of the patient on the floor/unit where the patient is located

## 2017-07-31 NOTE — PROGRESS NOTES
Respiratory Mechanics completed and are as follows:  Weaning Parameters  Spontaneous Breathing Trial Complete: Yes  Resp Rate Observed: 9  Ve: 6.6  VT: 853  RSBI: 10  Patient extubated to a 40L 40% airvo NC. Patient is able to communicate and is negative for stridor. Breath sounds are clear. No complications with extubation.      [address], RT

## 2017-07-31 NOTE — INTERDISCIPLINARY ROUNDS
Interdisciplinary team rounds were held 7/31/2017 with the following team members:Nursing, Physician, Respiratory Therapy and Clinical Coordinator . Recover per unit standard, routine. Plan of care discussed. See clinical pathway and/or care plan for interventions and desired outcomes.

## 2017-07-31 NOTE — ANESTHESIA POSTPROCEDURE EVALUATION
Post-Anesthesia Evaluation and Assessment    Patient: Florina Macdonald MRN: [id number]  SSN: [id number]    YOB: 1955  Age: 59 y.o. Sex: male       Cardiovascular Function/Vital Signs  Visit Vitals    /53    Pulse 84    Temp 37.6 °C (99.6 °F)    Resp 14    Ht 5' 9\" (1.753 m)    Wt 70.7 kg (155 lb 14.4 oz)    SpO2 100%    BMI 23.02 kg/m2       Patient is status post general anesthesia for Procedure(s):  CORONARY ARTERY BYPASS GRAFT X 2, LIMA    VEIN HARVEST GREATER SAPHENOUS  ESOPHAGEAL TRANS ECHOCARDIOGRAM.    Nausea/Vomiting: None    Postoperative hydration reviewed and adequate. Pain:  Pain Scale 1: Numeric (0 - 10) (07/31/17 1700)  Pain Intensity 1: 2 (07/31/17 1700)   Managed    Neurological Status:   Neuro (WDL): Within Defined Limits (07/31/17 0756)  Neuro  Neurologic State: Alert (07/31/17 1700)  Orientation Level: Oriented X4 (07/31/17 1700)  Cognition: Follows commands (07/31/17 1700)  Speech: Clear (07/31/17 1700)  Assessment L Pupil: Brisk;Round (07/31/17 1700)  Size L Pupil (mm): 3 (07/31/17 1700)  Assessment R Pupil: Brisk;Round (07/31/17 1700)  Size R Pupil (mm): 3 (07/31/17 1700)  LUE Motor Response: Purposeful (07/31/17 1700)  LLE Motor Response: Purposeful (07/31/17 1700)  RUE Motor Response: Purposeful (07/31/17 1700)  RLE Motor Response: Purposeful (07/31/17 1700)   At baseline    Mental Status and Level of Consciousness: Arousable    Pulmonary Status:   O2 Device: Nasal cannula (07/31/17 0805)   Adequate oxygenation and airway patent    Complications related to anesthesia: None    Post-anesthesia assessment completed. No concerns. Doing very well.      Signed By: Juan Lizarraga MD     July 31, 2017

## 2017-07-31 NOTE — PROGRESS NOTES
Patient out from operating room and placed on the ventilator on documented settings. Patient is orally intubated with a # 8.0 ET Tube secured at the 24 cm rosalio at the lip. Breath sounds are clear. Trachea is midline. Negative for subcutaneous air, chest excursion is symmetric. Negative for pitting edema. Patient is also Negative for cyanosis. All alarms are set and audible. Resuscitation bag is at the head of the bed. Ventilator Settings  Mode FIO2 Rate Tidal Volume Pressure PEEP I:E Ratio   PRVC  59 %          8 cm H20  1:3.33      Peak airway pressure: 19.9 cm H2O   Minute ventilation: 7.3 l/min     ABG: No results for input(s): PH, PCO2, PO2, HCO3 in the last 72 hours.       [address], RT

## 2017-07-31 NOTE — BRIEF OP NOTE
BRIEF OPERATIVE NOTE    Date of Procedure: 7/31/2017   Preoperative Diagnosis: Atherosclerosis of native coronary artery of native heart with unstable angina pectoris [facility] [I25.110]  Postoperative Diagnosis: Atherosclerosis of native coronary artery of native heart with unstable angina pectoris ([facility] [address] [I25.110]    Procedure(s):  CORONARY ARTERY BYPASS GRAFT X 2, LIMA to the LAD, SVG to the (L) PDA    VEIN HARVEST GREATER SAPHENOUS  ESOPHAGEAL TRANS ECHOCARDIOGRAM  Surgeon(s) and Role:     * Stephanie Ayala MD - Primary     * Jimmie Nick MD - Assisting         Assistant Staff:       Surgical Staff:  Circ-1: Mary Cruz RN  Circ-Relief: Fe Jerez RN  Perfusionist: Tasia Travis  Scrub Tech-1: Manuel Loera  Scrub RN-1: Braeden Hale RN  Scrub RN-Relief: Nguyen Duran RN  Event Time In   Incision Start 1018   Incision Close 1336     Anesthesia: General   Estimated Blood Loss: minimal  Specimens: * No specimens in log *   Findings:     Complications:    Implants: * No implants in log *

## 2017-07-31 NOTE — PROGRESS NOTES
TRANSFER - IN REPORT:    Verbal report received from Lamont Rowland. CRNA on Anders Samson  being received from OR for routine post - op      Report consisted of patients Situation, Background, Assessment and   Recommendations(SBAR). Information from the following report(s) SBAR, Kardex, OR Summary, Procedure Summary, Intake/Output, MAR, Accordion, Recent Results, Med Rec Status and Cardiac Rhythm NSR was reviewed with the receiving nurse. Opportunity for questions and clarification was provided. Assessment completed upon patients arrival to unit and care assumed.

## 2017-07-31 NOTE — ANESTHESIA PROCEDURE NOTES
SEA  Date/Time: 7/31/2017 9:55 AM  Ordering Provider: Chirag Avila    Procedure Details: probe placement, image aquisition & interpretation    Timeout performed, 09:55  Risks and benefits discussed with the patient and plans are to proceed    Procedure Note    Performed by: Husam Weber  Authorized by: Soren CEDEÑO       Indications: assessment of ascending aorta  Modalities: 2D, CF, CWD, PWD  Probe Type: multiplane  Insertion: atraumatic  Patient Status: intubated and sedated    Echocardiographic and Doppler Measurements   Aorta  Size  Diam(cm)  Dissection PlaqueThick(mm)  Plaque Mobile    Ascending normal 3.6 No 0-3 No    Arch         Descending normal  No 0-3 No          Valves  Annulus  Stenosis  Area/Grad  Regurg  Leaflet   Morph  Leaflet   Motion    Aortic normal none  0 normal normal    Mitral normal none  1+ normal normal    Tricuspid normal none  1+ normal normal          Atria  Size  SEC (smoke)  Thrombus  Tumor  Device    Rt Atrium normal No No No No    Lt Atrium normal No No No No     Interatrial Septum Morphology: normal    Interventricular Septum Morphology: normal    Ventricle  Cavity Size  Cavity Dimension Hypertrophy  Thrombus  Gloal FXN  EF    RV normal  No no normal     LV normal 6  No normal >55%       Regional Function  (1 = normal, 2 = mildly hypokinetic, 3 = severely hypokinetic, 4 = akinetic, 5 = dyskinetic) LAV - [place]   ME LAV = 0  ME LAV = 90  ME LAV = 130   Basal Sept:1 Basal Ant:1 Basal Post:1   Mid Sept:1 Mid Ant:1 Mid Post:1   Apical Sept:1 Apical Ant:1 Basal Ant Sept:1   Basal Lat:1 Basal Inf:1 Mid Ant Sept:1   Mid Lat:1 Mid Inf:1    Apical Lat:1 Apical Inf:1        Pericardium: normal    Post Intervention Follow-up Study  Ventricular Global Function: unchanged  Ventricular Regional Function: unchanged     Valve  Function  Regurgitation  Area    Aortic no change      Mitral no change      Tricuspid no change      Prosthetic        Complications: None  Comments:  All findings viewed by and discussed with surgeon  BRIELLE 3.5cm2

## 2017-07-31 NOTE — PROGRESS NOTES
Bedside report to [facility], pt calm, no distress noted. VSS. Chart and labs reviewed with on-coming nurse.

## 2017-07-31 NOTE — ANESTHESIA PROCEDURE NOTES
Central Line Placement    Start time: 7/31/2017 9:35 AM  End time: 7/31/2017 9:45 AM  Performed by: Bhavesh Taylor  Authorized by: Delta Parkers     Indications: vascular access, central pressure monitoring and need for vasopressors  Preanesthetic Checklist: patient identified, risks and benefits discussed, anesthesia consent, site marked, patient being monitored and timeout performed    Timeout Time: 09:35       Pre-procedure: All elements of maximal sterile barrier technique followed?  Yes    Maximal barrier precautions followed, 2% Chlorhexidine for cutaneous antisepsis, Hand hygiene performed prior to catheter insertion and Ultrasound guidance    Sterile Ultrasound Technique followed?: Yes          Procedure:   Prep:  ChloraPrep  Location:  Internal jugular  Orientation:  Right  Patient position:  Trendelenburg  Catheter type:  Double lumen  Catheter size:  9 Fr  Catheter length:  10 cm  Number of attempts:  1  Successful placement: Yes      Assessment:   Post-procedure:  Catheter secured, sterile dressing applied and sterile dressing with CHG applied  Assessment:  Placement verified by x-ray, free fluid flow, blood return through all ports and guidewire removal verified  Insertion:  Uncomplicated  Patient tolerance:  Patient tolerated the procedure well with no immediate complications  PA cath to 45 cm

## 2017-07-31 NOTE — INTERDISCIPLINARY ROUNDS
ICR rounds done at bedside. MD, Anesthesia, RNx2, RT, pharmacy in attendance. POC is routine and wean as tolerated.

## 2017-07-31 NOTE — PROGRESS NOTES
I have examined the patient, no changes in patient's medical status. Necessity for procedure is still present and the H&P is still current.

## 2017-07-31 NOTE — IP AVS SNAPSHOT
Surendra Gupta 
 
 
 [address] [facility] [address] [facility] 
[phone] Patient: Dea Lara MRN: [id number] :1955 You are allergic to the following No active allergies Immunizations Administered for This Admission Name Date  
 TB Skin Test (PPD) Intradermal 2017 Recent Documentation Height Weight BMI Smoking Status 1.753 m 73.6 kg 23.95 kg/m2 Former Smoker Emergency Contacts Name Discharge Info Relation Home Work Mobile DinoraHelen DISCHARGE CAREGIVER [3] Spouse [3] [phone] About your hospitalization You were admitted on:  2017 You last received care in the:  [facility] 2 CV STEPDOWN You were discharged on:  August 3, 2017 Unit phone number:  [phone] Why you were hospitalized Your primary diagnosis was:  Coronary Artery Disease Involving Native Coronary Artery Of Native Heart With Unstable Angina Pectoris (Hcc) Your diagnoses also included:  Respiratory Failure, Post-Operative (Hcc), Hypoxia, S/P Cabg (Coronary Artery Bypass Graft), Hypercholesterolemia, Hypertension, Stenosis Of Right 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 Cerebral Infarction Providers Seen During Your Hospitalizations Provider Role Specialty Primary office phone Fausto Ji MD Attending Provider Cardiothoracic Surgery [phone] Your Primary Care Physician (PCP) Primary Care Physician Office Phone Office Fax Anusha Silva [address] [phone] Follow-up Information Follow up With Details Comments Contact Info [facility]  Will call you to schedule visit for home health with nursing and physical therapy [address] Jeff Hutson [address] [phone] Nikolay Kevin, NP   [address] [facility] [address] [phone] Your Appointments Thursday August 10, 2017  8:45 AM EDT TRANSITIONAL CARE MANAGEMENT with Daisy Pelaez MD  
[facility] Cardiology ([address] [facility] [address]) [address] [facility]  
[phone] Tuesday August 15, 2017  2:40 PM EDT Discharge Followup with Clarisa Pelayo MD  
[address] [facility] ([address] [address] [phone] Thursday August 31, 2017 10:15 AM EDT  
ESTABLISHED PATIENT with Ayden Roblero MD  
[facility] ([facility]) [address] [place] [id number] [phone] Current Discharge Medication List  
  
START taking these medications Dose & Instructions Dispensing Information Comments Morning Noon Evening Bedtime  
 acetaminophen 325 mg tablet Commonly known as:  TYLENOL Your last dose was: Your next dose is:    
   
   
 Over the counter medication. May take for pain as needed as on label Quantity:  1 Tab Refills:  0  
     
   
   
   
  
 guaiFENesin  mg ER tablet Commonly known as:  Adalberto & Adalberto Your last dose was: Your next dose is:    
   
   
 Over the counter mucinex. Take as needed to keep secretions thin and easy to cough up Quantity:  1 Tab Refills:  0  
     
   
   
   
  
 metoprolol succinate 100 mg tablet Commonly known as:  TOPROL-XL Your last dose was: Your next dose is:    
   
   
 Dose:  100 mg Take 1 Tab by mouth daily. Quantity:  30 Tab Refills:  0  
     
   
   
   
  
 oxyCODONE-acetaminophen 5-325 mg per tablet Commonly known as:  PERCOCET Your last dose was: Your next dose is:    
   
   
 Dose:  1 Tab Take 1 Tab by mouth every four (4) hours as needed. Max Daily Amount: 6 Tabs. Quantity:  30 Tab Refills:  0  
     
   
   
   
  
 senna-docusate 8.6-50 mg per tablet Commonly known as:  Alexandra Sarmiento Your last dose was: Your next dose is:    
   
   
 Over the counter medicine, take as needed for constipation Quantity:  1 Tab Refills:  0  
     
   
   
   
  
 traMADol 50 mg tablet Commonly known as:  ULTRAM  
   
Your last dose was: Your next dose is:    
   
   
 Dose:  50 mg Take 1 Tab by mouth every six (6) hours as needed. Max Daily Amount: 200 mg. Quantity:  30 Tab Refills:  0 CONTINUE these medications which have NOT CHANGED Dose & Instructions Dispensing Information Comments Morning Noon Evening Bedtime  
 aspirin delayed-release 81 mg tablet Your last dose was: Your next dose is:    
   
   
 Dose:  81 mg Take 81 mg by mouth every morning. Take day of surgery per anesthesia protocol. Refills:  0  
     
   
   
   
  
 B COMPLEX 1 tablet Generic drug:  b complex vitamins Your last dose was: Your next dose is:    
   
   
 Dose:  1 Tab Take 1 Tab by mouth daily. Stop seven days prior to surgery per anesthesia protocol. Refills:  0  
     
   
   
   
  
 calcium carbonate 600 mg calcium (1,500 mg) tablet Commonly known as:  Wesley Estes Your last dose was: Your next dose is:    
   
   
 Dose:  600 mg Take 600 mg by mouth daily. Stop seven days prior to surgery per anesthesia protocol. Refills:  0  
     
   
   
   
  
 FISH OIL EXTRA STRENGTH PO Your last dose was: Your next dose is:    
   
   
 Dose:  2400 mg Take 2,400 mg by mouth daily. Stop seven days prior to surgery per anesthesia protocol. Refills:  0  
     
   
   
   
  
 multivitamin tablet Commonly known as:  ONE A DAY Your last dose was: Your next dose is:    
   
   
 Dose:  1 Tab Take 1 Tab by mouth daily. Stop seven days prior to surgery per anesthesia protocol. Refills:  0  
     
   
   
   
  
 simvastatin 80 mg tablet Commonly known as:  ZOCOR Your last dose was: Your next dose is:    
   
   
 Dose:  80 mg Take 80 mg by mouth every morning. Take day of surgery per anesthesia protocol. Indications: hypercholesterolemia Refills:  0 STOP taking these medications AMIODARONE PO  
   
  
 benazepril 40 mg tablet Commonly known as:  LOTENSIN  
   
  
 mupirocin calcium 2 % nasal ointment Commonly known as:  Rene Lobo your doctor about these medications Dose & Instructions Dispensing Information Comments Morning Noon Evening Bedtime LOPRESSOR PO Ask about: Should I take this medication? Your last dose was: Your next dose is:    
   
   
 Dose:  12.5 mg Take 12.5 mg by mouth once. Per dr Isamar Ulrich's orders take after 1600 day prior to surgery Refills:  0 Where to Get Your Medications Information on where to get these meds will be given to you by the nurse or doctor. ! Ask your nurse or doctor about these medications  
  acetaminophen 325 mg tablet  
 guaiFENesin  mg ER tablet  
 metoprolol succinate 100 mg tablet  
 oxyCODONE-acetaminophen 5-325 mg per tablet  
 senna-docusate 8.6-50 mg per tablet  
 traMADol 50 mg tablet Discharge Instructions None Discharge Instructions Attachments/References CORONARY ARTERY BYPASS GRAFT: POST-OP (ENGLISH) HEALTHY DIET: HEART (ENGLISH) CARDIAC REHABILITATION (ENGLISH) HEART ATTACK: MEDICINE TO REDUCE RISK (ENGLISH) Discharge Orders Procedure Order Date Status Priority Quantity Spec Type Associated Dx REFERRAL TO CARDIAC REHAB Lives in [place] 08/03/17 9883 Normal Routine 1  S/P CABG (coronary artery bypass graft) [[id number]] Comments:  Lives in [place] Announcement  We are excited to announce that we are making your provider's discharge notes available to you in meets. You will see these notes when they are completed and signed by the physician that discharged you from your recent hospital stay. If you have any questions or concerns about any information you see in meets, please call the Health Information Department where you were seen or reach out to your Primary Care Provider for more information about your plan of care. Introducing [facility]! Dear Yesi Bridges: 
Thank you for requesting a meets account. Our records indicate that you already have an active meets account. You can access your account anytime at [email or web address] Did you know that you can access your hospital and ER discharge instructions at any time in meets? You can also review all of your test results from your hospital stay or ER visit. Additional Information If you have questions, please visit the Frequently Asked Questions section of the meets website at [email or web address] Remember, meets is NOT to be used for urgent needs. For medical emergencies, dial 911. Now available from your iPhone and Android! General Information Please provide this summary of care documentation to your next provider. Patient Signature:  ____________________________________________________________ Date:  ____________________________________________________________  
  
Blanca Noe Provider Signature:  ____________________________________________________________ Date:  ____________________________________________________________ More Information Coronary Artery Bypass Graft: What to Expect at Home Your Recovery Coronary artery bypass graft (CABG) is surgery to treat coronary artery disease. The surgery helps blood make a detour, or bypass, around one or more narrowed or blocked coronary arteries.  Coronary arteries are the blood vessels that bring blood to the heart. Your doctor did the surgery through a cut, called an incision, in your chest. 
You will feel tired and sore for the first few weeks after surgery. You may have some brief, sharp pains on either side of your chest. Your chest, shoulders, and upper back may ache. The incision in your chest and the area where the healthy vein was taken may be sore or swollen. These symptoms usually get better after 4 to 6 weeks. You will probably be able to do many of your usual activities after 4 to 6 weeks. But for 2 to 3 months you will not be able to lift heavy objects or do activities that strain your chest or upper arm muscles. At first you may notice that you get tired easily and need to rest often. It may take 1 to 2 months to get your energy back. Some people find that they are more emotional after this surgery. You may cry easily or show emotion in ways that are unusual for you. This is common and may last for up to a year. Some people get depressed after CABG surgery. Talk with your doctor if you have sadness that continues or you are concerned about how you are feeling. Treatment and other support can help you feel better. Even though the surgery may improve your symptoms, you will still need to make changes in your lifestyle to lower your risk of a heart attack or stroke. It will be important to eat a heart-healthy diet, get regular exercise, not smoke, take your heart medicines, and reduce stress. You will likely start a cardiac rehabilitation (rehab) program in the hospital. You will continue with this rehab program after you go home to help you recover and prevent problems with your heart. Talk to your doctor about whether rehab is right for you. This care sheet gives you a general idea about how long it will take for you to recover. But each person recovers at a different pace. Follow the steps below to get better as quickly as possible. How can you care for yourself at home? Activity · Rest when you feel tired. Getting enough sleep will help you recover. Try to sleep on your back for 4 to 6 weeks while your breastbone (sternum) heals. This usually takes about 4 to 6 weeks. · Try to walk each day. Start by walking a little more than you did the day before. Bit by bit, increase the amount you walk. Walking boosts blood flow and helps prevent pneumonia and constipation. · Avoid strenuous activities, such as bicycle riding, jogging, weight lifting, or heavy aerobic exercise, until your doctor says it is okay. · For 3 months, avoid activities that strain your chest or upper arm muscles. This includes pushing a  or vacuum, mopping floors, or swinging a golf club or tennis racquet. · For 2 to 3 months, avoid lifting anything that would make you strain. This may include a child, heavy grocery bags and milk containers, a heavy briefcase or backpack, or cat litter or dog food bags. · Hold a pillow firmly over your chest incision when you cough or take deep breaths. This will support your chest and reduce your pain. · Do breathing exercises at home as instructed by your doctor. This will help prevent pneumonia. · Ask your doctor when you can drive again. · You will probably need to take 4 to 12 weeks off from work. It depends on the type of work you do and how you feel. · You may shower as usual. Pat the incision dry. Do not take a bath for the first 3 weeks, or until your doctor tells you it is okay. · Do not swim or use a hot tub for at least 1 month, or until your doctor says it is okay. · Ask your doctor when it is okay for you to have sex. Diet · Eat a heart-healthy diet. If you have not been eating this way, talk to your doctor. You also may want to talk to a dietitian. A dietitian can help you learn about healthy foods. · Drink plenty of fluids (unless your doctor tells you not to). · You may notice that your bowel movements are not regular right after your surgery. This is common. Try to avoid constipation and straining with bowel movements. You may want to take a fiber supplement every day. If you have not had a bowel movement after a couple of days, ask your doctor about taking a mild laxative. Medicines · Your doctor will tell you if and when you can restart your medicines. He or she will also give you instructions about taking any new medicines. · If you take blood thinners, such as warfarin (Coumadin), clopidogrel (Plavix), or aspirin, be sure to talk to your doctor. He or she will tell you if and when to start taking those medicines again. Make sure that you understand exactly what your doctor wants you to do. · Your doctor may give you medicines to prevent blood clots, keep your heartbeat steady, and lower your blood pressure and cholesterol. Take your medicines exactly as prescribed. Call your doctor if you think you are having a problem with your medicine. · Be safe with medicines. Take pain medicines exactly as directed. ¨ If the doctor gave you a prescription medicine for pain, take it as prescribed. ¨ If you are not taking a prescription pain medicine, ask your doctor if you can take an over-the-counter medicine. ¨ Do not take aspirin, ibuprofen (Advil, Motrin), naproxen (Aleve), or other nonsteroidal anti-inflammatory drugs (NSAIDs) unless your doctor says it is okay. · If you think your pain medicine is making you sick to your stomach: 
¨ Take your medicine after meals (unless your doctor has told you not to). ¨ Ask your doctor for a different pain medicine. · If your doctor prescribed antibiotics, take them as directed. Do not stop taking them just because you feel better. You need to take the full course of antibiotics. Incision care · If you have strips of tape on the incisions the doctor made, leave the tape on for a week or until it falls off. · Wash the area daily with warm, soapy water, and pat it dry. Don't use hydrogen peroxide or alcohol, which can slow healing. You may cover the area with a gauze bandage if it weeps or rubs against clothing. Change the bandage every day. · Keep the area clean and dry. · If you have an incision in your leg: ¨ Wear support stockings on your legs during the day for the first 2 weeks. You can take the stockings off at night while you sleep. ¨ Raise your legs above the level of your heart whenever you lay down for the first 4 to 6 weeks. Other instructions · Keep track of your weight. Weigh yourself every day at the same time of day, on the same scale, in the same amount of clothing. A sudden increase in weight can be a sign of a problem with your heart. Tell your doctor if you suddenly gain weight, such as 3 pounds or more in 2 to 3 days. · Do not smoke. Smoking can make it harder for you to recover and it will raise the chances of your arteries narrowing again. If you need help quitting, talk to your doctor about stop-smoking programs and medicines. These can increase your chances of quitting for good. Follow-up care is a key part of your treatment and safety. Be sure to make and go to all appointments, and call your doctor if you are having problems. It's also a good idea to know your test results and keep a list of the medicines you take. When should you call for help? Call 911 anytime you think you may need emergency care. For example, call if: 
· You passed out (lost consciousness). · You have severe trouble breathing. · You have sudden chest pain and shortness of breath, or you cough up blood. · You have severe pain in your chest. 
· You have symptoms of a heart attack. These may include: ¨ Chest pain or pressure, or a strange feeling in the chest. 
¨ Sweating. ¨ Shortness of breath. ¨ Nausea or vomiting.  
¨ Pain, pressure, or a strange feeling in the back, neck, jaw, or upper belly or in one or both shoulders or arms. ¨ Lightheadedness or sudden weakness. ¨ A fast or irregular heartbeat. After you call 911, the  may tell you to chew 1 adult-strength or 2 to 4 low-dose aspirin. Wait for an ambulance. Do not try to drive yourself. · You have angina symptoms (such as chest pain or pressure) that do not go away with rest or are not getting better within 5 minutes after you take a dose of nitroglycerin. Call your doctor now or seek immediate medical care if: 
· You have pain that does not get better after you take pain medicine. · You have a fever over 100°F. 
· You have loose stitches, or your incision comes open. · Bright red blood has soaked through the bandage over your incision. · You have signs of infection, such as: 
¨ Increased pain, swelling, warmth, or redness. ¨ Red streaks leading from the incision. ¨ Pus draining from the incision. ¨ Swollen lymph nodes in your neck, armpits, or groin. ¨ A fever. · You have signs of a blood clot in a leg. If you had a vein removed from your leg, you may have tenderness and swelling while your leg heals. But signs of a blood clot may be in a different part of your leg and may include: 
¨ Pain in your calf, back of the knee, thigh, or groin. ¨ Redness and swelling in your leg or groin. · Your heartbeat feels very fast or slow, skips beats, or flutters. · You are dizzy or lightheaded, or you feel like you may faint. · You have new or increased shortness of breath. Watch closely for changes in your health, and be sure to contact your doctor if: 
· You gain weight suddenly, such as 3 pounds or more in 2 to 3 days. · You have increased swelling in your legs, ankles, or feet. · You have any concerns about your incision. · You feel very sad or have other signs of depression, such as trouble sleeping or eating. · You have questions about diet, exercise, quitting smoking, or stress reduction after surgery. Where can you learn more? Go to http://amy-cong.info/. Enter F759 in the search box to learn more about \"Coronary Artery Bypass Graft: What to Expect at Home. \" Current as of: March 1, 2017 Content Version: 11.3 © 7898-8922 [employer]Sheng.fm. Care instructions adapted under license by [employer] (which disclaims liability or warranty for this information). If you have questions about a medical condition or this instruction, always ask your healthcare professional. [address] any warranty or liability for your use of this information. Heart-Healthy Diet: Care Instructions Your Care Instructions A heart-healthy diet has lots of vegetables, fruits, nuts, beans, and whole grains, and is low in salt. It limits foods that are high in saturated fat, such as meats, cheeses, and fried foods. It may be hard to change your diet, but even small changes can lower your risk of heart attack and heart disease. Follow-up care is a key part of your treatment and safety. Be sure to make and go to all appointments, and call your doctor if you are having problems. It's also a good idea to know your test results and keep a list of the medicines you take. How can you care for yourself at home? Watch your portions · Learn what a serving is. A \"serving\" and a \"portion\" are not always the same thing. Make sure that you are not eating larger portions than are recommended. For example, a serving of pasta is ½ cup. A serving size of meat is 2 to 3 ounces. A 3-ounce serving is about the size of a deck of cards. Measure serving sizes until you are good at [place]" them. Keep in mind that restaurants often serve portions that are 2 or 3 times the size of one serving. · To keep your energy level up and keep you from feeling hungry, eat often but in smaller portions. · Eat only the number of calories you need to stay at a healthy weight.  If you need to lose weight, eat fewer calories than your body burns (through exercise and other physical activity). Eat more fruits and vegetables · Eat a variety of fruit and vegetables every day. Dark green, deep orange, red, or yellow fruits and vegetables are especially good for you. Examples include spinach, carrots, peaches, and berries. · Keep carrots, celery, and other veggies handy for snacks. Buy fruit that is in season and store it where you can see it so that you will be tempted to eat it. · Cook dishes that have a lot of veggies in them, such as stir-fries and soups. Limit saturated and trans fat · Read food labels, and try to avoid saturated and trans fats. They increase your risk of heart disease. Trans fat is found in many processed foods such as cookies and crackers. · Use olive or canola oil when you cook. Try cholesterol-lowering spreads, such as Benecol or Take Control. · Bake, broil, grill, or steam foods instead of frying them. · Choose lean meats instead of high-fat meats such as hot dogs and sausages. Cut off all visible fat when you prepare meat. · Eat fish, skinless poultry, and meat alternatives such as soy products instead of high-fat meats. Soy products, such as tofu, may be especially good for your heart. · Choose low-fat or fat-free milk and dairy products. Eat fish · Eat at least two servings of fish a week. Certain fish, such as salmon and tuna, contain omega-3 fatty acids, which may help reduce your risk of heart attack. Eat foods high in fiber · Eat a variety of grain products every day. Include whole-grain foods that have lots of fiber and nutrients. Examples of whole-grain foods include oats, whole wheat bread, and brown rice. · Buy whole-grain breads and cereals, instead of white bread or pastries. Limit salt and sodium · Limit how much salt and sodium you eat to help lower your blood pressure. · Taste food before you salt it. Add only a little salt when you think you need it. With time, your taste buds will adjust to less salt. · Eat fewer snack items, fast foods, and other high-salt, processed foods. Check food labels for the amount of sodium in packaged foods. · Choose low-sodium versions of canned goods (such as soups, vegetables, and beans). Limit sugar · Limit drinks and foods with added sugar. These include candy, desserts, and soda pop. Limit alcohol · Limit alcohol to no more than 2 drinks a day for men and 1 drink a day for women. Too much alcohol can cause health problems. When should you call for help? Watch closely for changes in your health, and be sure to contact your doctor if: 
· You would like help planning heart-healthy meals. Where can you learn more? Go to http://amyHavelide Systemscong.info/. Enter V137 in the search box to learn more about \"Heart-Healthy Diet: Care Instructions. \" Current as of: April 3, 2017 Content Version: 11.3 © 3188-4356 [employer]. Care instructions adapted under license by [employer] (which disclaims liability or warranty for this information). If you have questions about a medical condition or this instruction, always ask your healthcare professional. [address] any warranty or liability for your use of this information. Cardiac Rehabilitation: Care Instructions Your Care Instructions Cardiac rehabilitation is a program for people who have a heart problem, such as a heart attack, heart failure, or a heart valve disease. The program includes exercise, lifestyle changes, education, and emotional support. Cardiac rehab can help you improve the quality of your life through better overall health. It can help you lose weight and feel better about yourself.  
On your cardiac rehab team, you may have your doctor, a nurse specialist, a dietitian, and a physical therapist. They will design your cardiac rehab program specifically for you. You will learn how to reduce your risk for heart problems, how to manage stress, and how to eat a heart-healthy diet. By the end of the program, you will be ready to maintain a healthier lifestyle on your own. Follow-up care is a key part of your treatment and safety. Be sure to make and go to all appointments, and call your doctor if you are having problems. It's also a good idea to know your test results and keep a list of the medicines you take. How can you care for yourself at home? · Take your medicines exactly as prescribed. Call your doctor if you think you are having a problem with your medicine. You will get more details on the specific medicines your doctor prescribes. · Weigh yourself every day if your doctor tells you to. Watch for sudden weight gain. Weigh yourself on the same scale with the same amount of clothing at the same time of day. · Plan your meals so that you are eating heart-healthy foods. ¨ Eat a variety of foods daily. Fresh fruits and vegetables and whole-grains are good choices. ¨ Limit your fat intake, especially saturated and trans fat. ¨ Limit salt (sodium). ¨ Increase fiber in your diet. ¨ Limit alcohol. · Learn how to take your pulse so that you can track your heart rate during exercise. · Always check with your doctor before you begin a new exercise program. 
· Warm up before you exercise and cool down afterward for at least 15 minutes each. This will help your heart gradually prepare for and recover from exercise and avoid pushing your heart too hard. · Stop exercising if you have any unusual discomfort, such as chest pain. · Do not smoke. Smoking can make heart problems worse. If you need help quitting, talk to your doctor about stop-smoking programs and medicines. These can increase your chances of quitting for good. When should you call for help? Call 911 anytime you think you may need emergency care. For example, call if: 
· You have severe trouble breathing. · You cough up pink, foamy mucus and you have trouble breathing. · You have symptoms of a heart attack. These may include: ¨ Chest pain or pressure, or a strange feeling in the chest. 
¨ Sweating. ¨ Shortness of breath. ¨ Nausea or vomiting. ¨ Pain, pressure, or a strange feeling in the back, neck, jaw, or upper belly or in one or both shoulders or arms. ¨ Lightheadedness or sudden weakness. ¨ A fast or irregular heartbeat. After you call 911, the  may tell you to chew 1 adult-strength or 2 to 4 low-dose aspirin. Wait for an ambulance. Do not try to drive yourself. · You have angina symptoms (such as chest pain or pressure) that do not go away with rest or are not getting better within 5 minutes after you take a dose of nitroglycerin. · You have symptoms of a stroke. These may include: 
¨ Sudden numbness, tingling, weakness, or loss of movement in your face, arm, or leg, especially on only one side of your body. ¨ Sudden vision changes. ¨ Sudden trouble speaking. ¨ Sudden confusion or trouble understanding simple statements. ¨ Sudden problems with walking or balance. ¨ A sudden, severe headache that is different from past headaches. · You passed out (lost consciousness). Call your doctor now or seek immediate medical care if: 
· You have new or increased shortness of breath. · You are dizzy or lightheaded, or you feel like you may faint. · You gain weight suddenly, such as more than 2 to 3 pounds in a day or 5 pounds in a week. (Your doctor may suggest a different range of weight gain.) · You have increased swelling in your legs, ankles, or feet. Watch closely for changes in your health, and be sure to contact your doctor if you have any problems. Where can you learn more? Go to http://casandra.info/. Enter P562 in the search box to learn more about \"Cardiac Rehabilitation: Care Instructions. \" Current as of: February 23, 2017 Content Version: 11.3 © 4770-7036 [employer]. Care instructions adapted under license by [employer] (which disclaims liability or warranty for this information). If you have questions about a medical condition or this instruction, always ask your healthcare professional. [address] any warranty or liability for your use of this information. Reducing Heart Attack Risk With Daily Medicine: Care Instructions Your Care Instructions Heart disease is the number one cause of death. If you are at risk for heart disease, there are many medicines that can reduce your risk. These include: · ACE inhibitors. These are a type of blood pressure medicine. They can reduce the risk of heart attacks and strokes if you are at high risk. · Statin medicines. These lower cholesterol. They can also reduce the risk of heart disease and strokes. · Aspirin. It can help certain people lower their risk of a heart attack or stroke. · Beta-blocker medicines. These are a type of blood pressure and heart medicine. They can reduce the chance of early death if you have had a heart attack. All medicines can cause side effects. So it is important to understand the pros and cons of any medicine you take. It is also important to take your medicines exactly as your doctor tells you to. Follow-up care is a key part of your treatment and safety. Be sure to make and go to all appointments, and call your doctor if you are having problems. It's also a good idea to know your test results and keep a list of the medicines you take. ACE inhibitors ACE (angiotensin-converting enzyme) inhibitors are used for three main reasons. They lower blood pressure, protect the kidneys, and prevent heart attacks and strokes.  Examples include benazepril (Lotensin), lisinopril (Prinivil, Zestril), and ramipril (Altace). Before you start taking an ACE inhibitor, make sure your doctor knows if: 
· You are taking a water pill (diuretic). · You are taking potassium pills or using salt substitutes. · You are pregnant or breastfeeding. · You have had a kidney transplant or other kidney problems. ACE inhibitors can cause side effects. Call your doctor right away if you have: · Trouble breathing. · Swelling in your face, head, neck, or tongue. · Dizziness or lightheadedness. · A dry cough. Statins Statins lower cholesterol. Examples include atorvastatin (Lipitor), lovastatin (Mevacor), pravastatin (Pravachol), and simvastatin (Zocor). Before you start taking a statin, make sure your doctor knows if: 
· You have had a kidney transplant or other kidney problems. · You have liver disease. · You take any other prescription medicine, over-the-counter medicine, vitamins, supplements, or herbal remedies. · You are pregnant or breastfeeding. Statins can cause side effects. Call your doctor right away if you have: · New, severe muscle aches. · Brown urine. Aspirin Taking an aspirin every day can lower your risk for a heart attack. A heart attack occurs when a blood vessel in the heart gets blocked. When this happens, oxygen can't get to the heart muscle, and part of the heart dies. Aspirin can help prevent blood clots that can block the blood vessels. Talk to your doctor before you start taking aspirin every day. He or she may recommend that you take one low-dose aspirin (81 mg) tablet each day, with a meal and a full glass of water. Taking aspirin isn't right for everyone, because it can cause serious bleeding. And you may not be able to use aspirin if you: 
· Have asthma. · Have an ulcer or other stomach problem. · Take some other medicine (called a blood thinner) that prevents blood clots. · Are allergic to aspirin. Before having a surgery or procedure, tell your doctor or dentist that you take aspirin. He or she will tell you if you should stop taking aspirin beforehand. Make sure that you understand exactly what your doctor wants you to do. Aspirin can cause side effects. Call your doctor right away if you have: · Unusual bleeding or bruising. · Nausea, vomiting, or heartburn. · Black or bloody stools. Beta-blockers Beta-blockers are used for three main reasons. They lower blood pressure, relieve angina symptoms (such as chest pain or pressure), and reduce the chances of a second heart attack. They include atenolol (Tenormin), carvedilol (Coreg), and metoprolol (Lopressor). Before you start taking a beta-blocker, make sure your doctor knows if you have: · Severe asthma or frequent asthma attacks. · A very slow pulse (less than 55 beats a minute). Beta-blockers can cause side effects. Call your doctor right away if you have: · Wheezing or trouble breathing. · Dizziness or lightheadedness. · Asthma that gets worse. When should you call for help? Call 911 anytime you think you may need emergency care. For example, call if: 
· You passed out (lost consciousness). Call your doctor now or seek immediate medical care if: 
· You are wheezing or have trouble breathing. · You have swelling in your face, head, neck, or tongue. · You are dizzy or lightheaded, or you feel like you may faint. · You have severe muscle pain, weakness, or brown urine. · You have vision problems. · You have new bruises or blood spots under your skin. · Your stools are black and tarlike or have streaks of blood. Watch closely for changes in your health, and be sure to contact your doctor if: 
· You have ringing in your ears. · You feel very tired. · You have gas, constipation, or an upset stomach. Where can you learn more? Go to http://amy-cong.info/. Enter R428 in the search box to learn more about \"Reducing Heart Attack Risk With Daily Medicine: Care Instructions. \" Current as of: September 21, 2016 Content Version: 11.3 © 6105-1070 [employer]. Care instructions adapted under license by [employer] (which disclaims liability or warranty for this information). If you have questions about a medical condition or this instruction, always ask your healthcare professional. [place] [address] any warranty or liability for your use of this information.

## 2017-08-01 ENCOUNTER — APPOINTMENT (OUTPATIENT)
Dept: GENERAL RADIOLOGY | Age: 62
DRG: 235 | End: 2017-08-01
Attending: THORACIC SURGERY (CARDIOTHORACIC VASCULAR SURGERY)
Payer: COMMERCIAL

## 2017-08-01 PROBLEM — I25.110 CORONARY ARTERY DISEASE INVOLVING NATIVE CORONARY ARTERY OF NATIVE HEART WITH UNSTABLE ANGINA PECTORIS (HCC): Status: ACTIVE | Noted: 2017-07-31

## 2017-08-01 PROBLEM — J95.821 RESPIRATORY FAILURE, POST-OPERATIVE (HCC): Status: RESOLVED | Noted: 2017-07-31 | Resolved: 2017-08-01

## 2017-08-01 LAB
ANION GAP BLD CALC-SCNC: 10 MMOL/L (ref 7–16)
ATRIAL RATE: 90 BPM
BUN SERPL-MCNC: 14 MG/DL (ref 8–23)
CALCIUM SERPL-MCNC: 6.9 MG/DL (ref 8.3–10.4)
CALCULATED P AXIS, ECG09: 40 DEGREES
CALCULATED R AXIS, ECG10: 25 DEGREES
CALCULATED T AXIS, ECG11: 17 DEGREES
CHLORIDE SERPL-SCNC: 112 MMOL/L (ref 98–107)
CO2 SERPL-SCNC: 22 MMOL/L (ref 21–32)
CREAT SERPL-MCNC: 0.72 MG/DL (ref 0.8–1.5)
DIAGNOSIS, 93000: NORMAL
ERYTHROCYTE [DISTWIDTH] IN BLOOD BY AUTOMATED COUNT: 13.1 % (ref 11.9–14.6)
GLUCOSE BLD STRIP.AUTO-MCNC: 124 MG/DL (ref 65–100)
GLUCOSE BLD STRIP.AUTO-MCNC: 130 MG/DL (ref 65–100)
GLUCOSE BLD STRIP.AUTO-MCNC: 131 MG/DL (ref 65–100)
GLUCOSE BLD STRIP.AUTO-MCNC: 132 MG/DL (ref 65–100)
GLUCOSE BLD STRIP.AUTO-MCNC: 137 MG/DL (ref 65–100)
GLUCOSE BLD STRIP.AUTO-MCNC: 138 MG/DL (ref 65–100)
GLUCOSE SERPL-MCNC: 133 MG/DL (ref 65–100)
HCT VFR BLD AUTO: 32.3 % (ref 41.1–50.3)
HGB BLD-MCNC: 11.1 G/DL (ref 13.6–17.2)
MAGNESIUM SERPL-MCNC: 2.4 MG/DL (ref 1.8–2.4)
MCH RBC QN AUTO: 32 PG (ref 26.1–32.9)
MCHC RBC AUTO-ENTMCNC: 34.4 G/DL (ref 31.4–35)
MCV RBC AUTO: 93.1 FL (ref 79.6–97.8)
MM INDURATION POC: 0 MM (ref 0–5)
P-R INTERVAL, ECG05: 130 MS
PLATELET # BLD AUTO: 112 K/UL (ref 150–450)
PMV BLD AUTO: 9.3 FL (ref 10.8–14.1)
POTASSIUM SERPL-SCNC: 4.1 MMOL/L (ref 3.5–5.1)
PPD POC: NEGATIVE NEGATIVE
Q-T INTERVAL, ECG07: 348 MS
QRS DURATION, ECG06: 82 MS
QTC CALCULATION (BEZET), ECG08: 425 MS
RBC # BLD AUTO: 3.47 M/UL (ref 4.23–5.67)
SODIUM SERPL-SCNC: 144 MMOL/L (ref 136–145)
VENTRICULAR RATE, ECG03: 90 BPM
WBC # BLD AUTO: 5 K/UL (ref 4.3–11.1)

## 2017-08-01 PROCEDURE — 85027 COMPLETE CBC AUTOMATED: CPT | Performed by: THORACIC SURGERY (CARDIOTHORACIC VASCULAR SURGERY)

## 2017-08-01 PROCEDURE — 74011250637 HC RX REV CODE- 250/637: Performed by: NURSE PRACTITIONER

## 2017-08-01 PROCEDURE — 71010 XR CHEST SNGL V: CPT

## 2017-08-01 PROCEDURE — 99232 SBSQ HOSP IP/OBS MODERATE 35: CPT | Performed by: INTERNAL MEDICINE

## 2017-08-01 PROCEDURE — 77030021668 HC NEB PREFIL KT VYRM -A

## 2017-08-01 PROCEDURE — 97161 PT EVAL LOW COMPLEX 20 MIN: CPT

## 2017-08-01 PROCEDURE — 80048 BASIC METABOLIC PNL TOTAL CA: CPT | Performed by: THORACIC SURGERY (CARDIOTHORACIC VASCULAR SURGERY)

## 2017-08-01 PROCEDURE — 65660000004 HC RM CVT STEPDOWN

## 2017-08-01 PROCEDURE — 74011250637 HC RX REV CODE- 250/637: Performed by: THORACIC SURGERY (CARDIOTHORACIC VASCULAR SURGERY)

## 2017-08-01 PROCEDURE — 82962 GLUCOSE BLOOD TEST: CPT

## 2017-08-01 PROCEDURE — 74011250636 HC RX REV CODE- 250/636: Performed by: THORACIC SURGERY (CARDIOTHORACIC VASCULAR SURGERY)

## 2017-08-01 PROCEDURE — 36600 WITHDRAWAL OF ARTERIAL BLOOD: CPT

## 2017-08-01 PROCEDURE — 93005 ELECTROCARDIOGRAM TRACING: CPT | Performed by: THORACIC SURGERY (CARDIOTHORACIC VASCULAR SURGERY)

## 2017-08-01 PROCEDURE — 83735 ASSAY OF MAGNESIUM: CPT | Performed by: THORACIC SURGERY (CARDIOTHORACIC VASCULAR SURGERY)

## 2017-08-01 PROCEDURE — 77010033711 HC HIGH FLOW OXYGEN

## 2017-08-01 RX ORDER — MUPIROCIN 20 MG/G
OINTMENT TOPICAL 2 TIMES DAILY
Status: DISCONTINUED | OUTPATIENT
Start: 2017-08-01 | End: 2017-08-01

## 2017-08-01 RX ORDER — ASPIRIN 81 MG/1
81 TABLET ORAL DAILY
Status: DISCONTINUED | OUTPATIENT
Start: 2017-08-01 | End: 2017-08-01 | Stop reason: SDUPTHER

## 2017-08-01 RX ORDER — POTASSIUM CHLORIDE 20 MEQ/1
40 TABLET, EXTENDED RELEASE ORAL
Status: DISCONTINUED | OUTPATIENT
Start: 2017-08-01 | End: 2017-08-03 | Stop reason: HOSPADM

## 2017-08-01 RX ORDER — ACETAMINOPHEN 325 MG/1
650 TABLET ORAL
Status: DISCONTINUED | OUTPATIENT
Start: 2017-08-01 | End: 2017-08-03 | Stop reason: HOSPADM

## 2017-08-01 RX ORDER — SODIUM CHLORIDE 0.9 % (FLUSH) 0.9 %
5-10 SYRINGE (ML) INJECTION AS NEEDED
Status: DISCONTINUED | OUTPATIENT
Start: 2017-08-01 | End: 2017-08-03 | Stop reason: HOSPADM

## 2017-08-01 RX ORDER — SODIUM CHLORIDE 0.9 % (FLUSH) 0.9 %
5-10 SYRINGE (ML) INJECTION EVERY 8 HOURS
Status: DISCONTINUED | OUTPATIENT
Start: 2017-08-01 | End: 2017-08-03 | Stop reason: HOSPADM

## 2017-08-01 RX ORDER — ZOLPIDEM TARTRATE 5 MG/1
5 TABLET ORAL
Status: DISCONTINUED | OUTPATIENT
Start: 2017-08-01 | End: 2017-08-03 | Stop reason: HOSPADM

## 2017-08-01 RX ORDER — AMIODARONE HYDROCHLORIDE 200 MG/1
200 TABLET ORAL EVERY 12 HOURS
Status: DISCONTINUED | OUTPATIENT
Start: 2017-08-01 | End: 2017-08-01 | Stop reason: SDUPTHER

## 2017-08-01 RX ORDER — FAMOTIDINE 20 MG/1
20 TABLET, FILM COATED ORAL EVERY 12 HOURS
Status: DISCONTINUED | OUTPATIENT
Start: 2017-08-01 | End: 2017-08-03 | Stop reason: HOSPADM

## 2017-08-01 RX ORDER — OXYCODONE AND ACETAMINOPHEN 5; 325 MG/1; MG/1
1 TABLET ORAL
Status: DISCONTINUED | OUTPATIENT
Start: 2017-08-01 | End: 2017-08-03 | Stop reason: HOSPADM

## 2017-08-01 RX ORDER — ATORVASTATIN CALCIUM 40 MG/1
80 TABLET, FILM COATED ORAL
Status: DISCONTINUED | OUTPATIENT
Start: 2017-08-01 | End: 2017-08-03 | Stop reason: HOSPADM

## 2017-08-01 RX ORDER — METOPROLOL TARTRATE 25 MG/1
25 TABLET, FILM COATED ORAL EVERY 8 HOURS
Status: DISCONTINUED | OUTPATIENT
Start: 2017-08-01 | End: 2017-08-03 | Stop reason: HOSPADM

## 2017-08-01 RX ORDER — OXYCODONE AND ACETAMINOPHEN 10; 325 MG/1; MG/1
1 TABLET ORAL
Status: DISCONTINUED | OUTPATIENT
Start: 2017-08-01 | End: 2017-08-03 | Stop reason: HOSPADM

## 2017-08-01 RX ORDER — MAG HYDROX/ALUMINUM HYD/SIMETH 200-200-20
30 SUSPENSION, ORAL (FINAL DOSE FORM) ORAL
Status: DISCONTINUED | OUTPATIENT
Start: 2017-08-01 | End: 2017-08-03 | Stop reason: HOSPADM

## 2017-08-01 RX ORDER — FAMOTIDINE 20 MG/1
20 TABLET, FILM COATED ORAL 2 TIMES DAILY
Status: DISCONTINUED | OUTPATIENT
Start: 2017-08-01 | End: 2017-08-01

## 2017-08-01 RX ORDER — POTASSIUM CHLORIDE 20 MEQ/1
20 TABLET, EXTENDED RELEASE ORAL
Status: DISCONTINUED | OUTPATIENT
Start: 2017-08-01 | End: 2017-08-03 | Stop reason: HOSPADM

## 2017-08-01 RX ORDER — POTASSIUM CHLORIDE 750 MG/1
10 TABLET, EXTENDED RELEASE ORAL DAILY
Status: DISCONTINUED | OUTPATIENT
Start: 2017-08-01 | End: 2017-08-01

## 2017-08-01 RX ORDER — LANOLIN ALCOHOL/MO/W.PET/CERES
400 CREAM (GRAM) TOPICAL
Status: DISCONTINUED | OUTPATIENT
Start: 2017-08-01 | End: 2017-08-03 | Stop reason: HOSPADM

## 2017-08-01 RX ORDER — MUPIROCIN 20 MG/G
OINTMENT TOPICAL EVERY 12 HOURS
Status: DISCONTINUED | OUTPATIENT
Start: 2017-08-01 | End: 2017-08-03 | Stop reason: HOSPADM

## 2017-08-01 RX ORDER — METOPROLOL TARTRATE 25 MG/1
25 TABLET, FILM COATED ORAL EVERY 12 HOURS
Status: DISCONTINUED | OUTPATIENT
Start: 2017-08-01 | End: 2017-08-01 | Stop reason: SDUPTHER

## 2017-08-01 RX ORDER — TRAMADOL HYDROCHLORIDE 50 MG/1
50 TABLET ORAL
Status: DISCONTINUED | OUTPATIENT
Start: 2017-08-01 | End: 2017-08-03 | Stop reason: HOSPADM

## 2017-08-01 RX ADMIN — METOPROLOL TARTRATE 25 MG: 25 TABLET ORAL at 20:08

## 2017-08-01 RX ADMIN — Medication 10 ML: at 14:25

## 2017-08-01 RX ADMIN — TRAMADOL HYDROCHLORIDE 50 MG: 50 TABLET, FILM COATED ORAL at 16:39

## 2017-08-01 RX ADMIN — FAMOTIDINE 20 MG: 20 TABLET ORAL at 20:08

## 2017-08-01 RX ADMIN — ASPIRIN 81 MG 81 MG: 81 TABLET ORAL at 08:23

## 2017-08-01 RX ADMIN — AMIODARONE HYDROCHLORIDE 200 MG: 200 TABLET ORAL at 08:23

## 2017-08-01 RX ADMIN — OXYCODONE HYDROCHLORIDE AND ACETAMINOPHEN 1 TABLET: 5; 325 TABLET ORAL at 00:09

## 2017-08-01 RX ADMIN — ATORVASTATIN CALCIUM 80 MG: 40 TABLET, FILM COATED ORAL at 20:08

## 2017-08-01 RX ADMIN — OXYCODONE HYDROCHLORIDE AND ACETAMINOPHEN 1 TABLET: 5; 325 TABLET ORAL at 08:38

## 2017-08-01 RX ADMIN — CEFAZOLIN 2 G: 1 INJECTION, POWDER, FOR SOLUTION INTRAMUSCULAR; INTRAVENOUS; PARENTERAL at 04:34

## 2017-08-01 RX ADMIN — OXYCODONE HYDROCHLORIDE AND ACETAMINOPHEN 1 TABLET: 10; 325 TABLET ORAL at 22:23

## 2017-08-01 RX ADMIN — AMIODARONE HYDROCHLORIDE 200 MG: 200 TABLET ORAL at 20:09

## 2017-08-01 RX ADMIN — METOPROLOL TARTRATE 25 MG: 25 TABLET ORAL at 16:39

## 2017-08-01 RX ADMIN — METOPROLOL TARTRATE 25 MG: 25 TABLET ORAL at 08:23

## 2017-08-01 RX ADMIN — OXYCODONE HYDROCHLORIDE AND ACETAMINOPHEN 1 TABLET: 5; 325 TABLET ORAL at 18:45

## 2017-08-01 RX ADMIN — MUPIROCIN: 20 OINTMENT TOPICAL at 20:10

## 2017-08-01 RX ADMIN — Medication 10 ML: at 20:09

## 2017-08-01 RX ADMIN — FAMOTIDINE 20 MG: 20 TABLET ORAL at 10:52

## 2017-08-01 RX ADMIN — OXYCODONE HYDROCHLORIDE AND ACETAMINOPHEN 1 TABLET: 5; 325 TABLET ORAL at 04:33

## 2017-08-01 RX ADMIN — OXYCODONE HYDROCHLORIDE AND ACETAMINOPHEN 1 TABLET: 5; 325 TABLET ORAL at 14:24

## 2017-08-01 RX ADMIN — MUPIROCIN: 20 OINTMENT TOPICAL at 09:17

## 2017-08-01 RX ADMIN — Medication 10 ML: at 05:56

## 2017-08-01 NOTE — PROGRESS NOTES
TRANSFER - IN REPORT:    Verbal report received from Cathryn Sánchez RN(name) on Ricardo Gould  being received from [facility](unit) for routine progression of care      Report consisted of patients Situation, Background, Assessment and   Recommendations(SBAR). Information from the following report(s) SBAR, Kardex, OR Summary, MAR and Recent Results was reviewed with the receiving nurse. Opportunity for questions and clarification was provided. Assessment completed upon patients arrival to unit and care assumed. Dual skin assessment with RN. Allevyn peeled back to view sacrum. No redness or breakdown noted. Allevyn reapplied. MS and LLE incision with dressing clean,dry and intact.

## 2017-08-01 NOTE — PROGRESS NOTES
Pt. Instructed that chest tubes would be removed and how. Pt. Instructed to take deep breath and hold during extraction. Pt.'s chest tubes removed without incident. Sutures triple tied. Petroleum guaze placed over site. Site bandaged with 4x4 and tape. Pacer wires isoslated and taped. Naik cath removed per orders without incident. Pt.'s SWAN introducer cath removed and cath tip intact. Pressure held using a 2x2 with triple antibiotic ointment on it and covered with a tegaderm. No bleeding noted at the site. Will continue to monitor.

## 2017-08-01 NOTE — PROGRESS NOTES
TRANSFER - OUT REPORT:    Verbal report given to EVANGELIST gupta on April Grammes  being transferred to [facility] for routine progression of care       Report consisted of patients Situation, Background, Assessment and Recommendations(SBAR). Information from the following report(s) SBAR, Procedure Summary, MAR, Recent Results and Cardiac Rhythm NSR was reviewed with the receiving nurse. Opportunity for questions and clarification was provided.

## 2017-08-01 NOTE — PROGRESS NOTES
HR increasing through out the day up to 109-120. Sinus Tach on monitor. Wyatt Cifuentes NP notified. Will check with Dr. Army Abdullahi. See new orders.

## 2017-08-01 NOTE — PROGRESS NOTES
CV Progress Note    Admit Date: 7/31/2017    POD 1    Subjective:     Patient present conditions: Awake, Alert and Cooperative. Review of Systems   Cardiac: Vital signs stable. Lines out  Respiratory: Chest x-ray clear. Minimal chest tube drainage. extubated  Neuro: Moves all four extremities. Incision: Dry. GI: Taking liquids. Objective:     Vitals:  Blood pressure 135/80, pulse 88, temperature 98 °F (36.7 °C), resp. rate 16, height 5' 9\" (1.753 m), weight 167 lb (75.8 kg), SpO2 100 %. I/O:  08/01 0701 - 08/01 1900  In: -   Out: 90 [Urine:60]  07/30 1901 - 08/01 0700  In: 2809 [P.O.:720; I.V.:1559]  Out: 2628 [Urine:2033]    Heart: No Murmur. Lung: Working with IS. Neuro: Cooperative. Incisions: Dry.     ECG/Telemetry: Unchanged from Pre-Op    Labs:  Recent Results (from the past 12 hour(s))   GLUCOSE, POC    Collection Time: 07/31/17  9:58 PM   Result Value Ref Range    Glucose (POC) 135 (H) 65 - 100 mg/dL   MAGNESIUM    Collection Time: 07/31/17 10:00 PM   Result Value Ref Range    Magnesium 2.5 (H) 1.8 - 2.4 mg/dL   POTASSIUM    Collection Time: 07/31/17 10:00 PM   Result Value Ref Range    Potassium 4.2 3.5 - 5.1 mmol/L   HGB & HCT    Collection Time: 07/31/17 10:00 PM   Result Value Ref Range    HGB 11.3 (L) 13.6 - 17.2 g/dL    HCT 33.4 (L) 41.1 - 50.3 %   GLUCOSE, POC    Collection Time: 08/01/17 12:15 AM   Result Value Ref Range    Glucose (POC) 131 (H) 65 - 542 mg/dL   METABOLIC PANEL, BASIC    Collection Time: 08/01/17  2:15 AM   Result Value Ref Range    Sodium 144 136 - 145 mmol/L    Potassium 4.1 3.5 - 5.1 mmol/L    Chloride 112 (H) 98 - 107 mmol/L    CO2 22 21 - 32 mmol/L    Anion gap 10 7 - 16 mmol/L    Glucose 133 (H) 65 - 100 mg/dL    BUN 14 8 - 23 MG/DL    Creatinine 0.72 (L) 0.8 - 1.5 MG/DL    GFR est AA >60 >60 ml/min/1.73m2    GFR est non-AA >60 >60 ml/min/1.73m2    Calcium 6.9 (L) 8.3 - 10.4 MG/DL   MAGNESIUM    Collection Time: 08/01/17  2:15 AM   Result Value Ref Range Magnesium 2.4 1.8 - 2.4 mg/dL   CBC W/O DIFF    Collection Time: 08/01/17  2:15 AM   Result Value Ref Range    WBC 5.0 4.3 - 11.1 K/uL    RBC 3.47 (L) 4.23 - 5.67 M/uL    HGB 11.1 (L) 13.6 - 17.2 g/dL    HCT 32.3 (L) 41.1 - 50.3 %    MCV 93.1 79.6 - 97.8 FL    MCH 32.0 26.1 - 32.9 PG    MCHC 34.4 31.4 - 35.0 g/dL    RDW 13.1 11.9 - 14.6 %    PLATELET 807 (L) 687 - 450 K/uL    MPV 9.3 (L) 10.8 - 14.1 FL   GLUCOSE, POC    Collection Time: 08/01/17  2:19 AM   Result Value Ref Range    Glucose (POC) 130 (H) 65 - 100 mg/dL   GLUCOSE, POC    Collection Time: 08/01/17  5:25 AM   Result Value Ref Range    Glucose (POC) 124 (H) 65 - 100 mg/dL   EKG, 12 LEAD, SUBSEQUENT    Collection Time: 08/01/17  6:23 AM   Result Value Ref Range    Ventricular Rate 90 BPM    Atrial Rate 90 BPM    P-R Interval 130 ms    QRS Duration 82 ms    Q-T Interval 348 ms    QTC Calculation (Bezet) 425 ms    Calculated P Axis 40 degrees    Calculated R Axis 25 degrees    Calculated T Axis 17 degrees    Diagnosis       Normal sinus rhythm  ST elevation consider anterior injury or acute infarct  !!!! !!!! ACUTE MI !!!! !!!!  Abnormal ECG  When compared with ECG of 31-JUL-2017 14:43,  Questionable change in QRS axis  ST elevation now present in Anterolateral leads  T wave inversion now evident in Inferior leads         Assessment:     Stable. Plan transfer today      Plan/Recommendations/Medical Decision Making:     Continue present treatment    Discontinue: Chest tubes today. See orders    Jeane Keating.  Army Kaylen MD

## 2017-08-01 NOTE — PROGRESS NOTES
Critical Care Daily Progress Note: 8/1/2017    Amanda Pacheco   Admission Date: 7/31/2017         The patient's chart is reviewed and the patient is discussed with the staff.        Subjective:   Extubated last night  Complains of mld incisional pain     Current Facility-Administered Medications   Medication Dose Route Frequency    0.9% sodium chloride infusion  25 mL/hr IntraVENous CONTINUOUS    dextrose 5% - 0.45% NaCl with KCl 20 mEq/L infusion  25 mL/hr IntraVENous CONTINUOUS    sodium chloride (NS) flush 5-10 mL  5-10 mL IntraVENous Q8H    sodium chloride (NS) flush 5-10 mL  5-10 mL IntraVENous PRN    oxyCODONE-acetaminophen (PERCOCET) 5-325 mg per tablet 1 Tab  1 Tab Oral Q4H PRN    morphine injection 3-5 mg  3-5 mg IntraVENous Q1H PRN    naloxone (NARCAN) injection 0.4 mg  0.4 mg IntraVENous PRN    mupirocin (BACTROBAN) 2 % ointment   Both Nostrils BID    sodium bicarbonate 8.4 % (1 mEq/mL) injection 50 mEq  50 mEq IntraVENous PRN    EPINEPHrine (ADRENALIN) 4,000 mcg in 0.9% sodium chloride 250 mL infusion  0.05-0.1 mcg/kg/min IntraVENous TITRATE    nitroglycerin (Tridil) 200 mcg/ml infusion   mcg/min IntraVENous TITRATE    PHENYLephrine (NEOSYNEPHRINE) 30,000 mcg in 0.9% sodium chloride 250 mL infusion   mcg/min IntraVENous TITRATE    lidocaine (PF) (XYLOCAINE) 100 mg/5 mL (2 %) injection syringe  mg   mg IntraVENous ONCE PRN    amiodarone (CORDARONE) tablet 200 mg  200 mg Oral Q12H    metoprolol tartrate (LOPRESSOR) tablet 25 mg  25 mg Oral Q12H    insulin regular (NOVOLIN R, HUMULIN R) 100 Units in 0.9% sodium chloride 100 mL infusion  1 Units/hr IntraVENous TITRATE    dextrose (D50W) injection syrg 12.5 g  25 mL IntraVENous PRN    aspirin chewable tablet 81 mg  81 mg Oral DAILY    magnesium sulfate 1 g/100 ml IVPB (premix or compounded)  1 g IntraVENous PRN    potassium chloride 10 mEq in 50 ml IVPB  10 mEq IntraVENous PRN    midazolam (VERSED) injection 1 mg  1 mg IntraVENous Q1H PRN    chlorhexidine (PERIDEX) 0.12 % mouthwash 10 mL  10 mL Oral BID    simvastatin (ZOCOR) tablet 20 mg  20 mg Oral QHS       Review of Systems    Constitutional:  negative for fever, chills, sweats  Cardiovascular:  negative for chest pain, palpitations, syncope, edema  Gastrointestinal:  negative for dysphagia, reflux, vomiting, diarrhea, abdominal pain, or melena  Neurologic:  negative for focal weakness, numbness, headache      Objective:     Vitals:    08/01/17 0500 08/01/17 0515 08/01/17 0530 08/01/17 0600   BP: 140/79 138/74 137/84    Pulse: 90 90 90 90   Resp: 13 13 23 18   Temp:    98.7 °F (37.1 °C)   SpO2: 100% 99% 100% 99%   Weight:    167 lb (75.8 kg)   Height:           Intake and Output:   07/30 0701 - 07/31 1900  In: 1535.3 [I.V.:1005.3]  Out: 1913 [AEQZP:2062]  07/31 1901 - 08/01 0700  In: 1273.7 [P.O.:720; I.V.:553.7]  Out: 851 [Urine:521]    Physical Exam:          Constitutional:  the patient is well developed and in no acute distress  EENMT:  Sclera clear, pupils equal, oral mucosa moist  Respiratory: clear  Cardiovascular:  RRR without M,G,R  Gastrointestinal: soft and non-tender; with positive bowel sounds. Musculoskeletal: warm without cyanosis. There is no lower leg edema.   Skin:  no jaundice or rashes, sternal wounds   Neurologic: no gross neuro deficits     Psychiatric:  alert and oriented x 3    LINES:  RIJ Whittier [place] catheter, CT,     DRIPS:   None     CXR:     LAB  Recent Labs      08/01/17   0525  08/01/17   0219  08/01/17   0015  07/31/17   2158  07/31/17   1911   GLUCPOC  124*  130*  131*  135*  128*      Recent Labs      08/01/17   0215  07/31/17   2200  07/31/17   1810  07/31/17   1415   WBC  5.0   --    --   4.7   HGB  11.1*  11.3*  11.8*  11.8*   HCT  32.3*  33.4*  34.5*  35.5*   PLT  112*   --    --   103*   INR   --    --    --   1.2     Recent Labs      08/01/17   0215  07/31/17   2200  07/31/17   1810  07/31/17   1415   NA  144   -- --   144   K  4.1  4.2  4.1  3.8   CL  112*   --    --   109*   CO2  22   --    --   25   GLU  133*   --    --   87   BUN  14   --    --   18   CREA  0.72*   --    --   0.74*   MG  2.4  2.5*  2.7*  3.4*   CA  6.9*   --    --   7.2*     Recent Labs      07/31/17   1415   PH  7.37   PCO2  42   PO2  270*   HCO3  24         Assessment:  (Medical Decision Making)     Hospital Problems  Date Reviewed: 7/31/2017          Codes Class Noted POA    * (Principal)CAD (coronary artery disease) ICD-10-CM: I25.10  ICD-9-CM: 414.00  7/31/2017 Yes        Respiratory failure, post-operative ([facility] [address] ICD-10-CM: H47.439  ICD-9-CM: 518.51  7/31/2017 No        Hypoxia ICD-10-CM: R09.02  ICD-9-CM: 799.02  7/31/2017 No        S/P CABG (coronary artery bypass graft) ICD-10-CM: Z95.1  ICD-9-CM: V45.81  7/31/2017 Unknown              Plan:  (Medical Decision Making)     -- IS, pulmonary toilet  -likely gong to stepdown ubit    More than 50% of the time documented was spent in face-to-face contact with the patient and in the care of the patient on the floor/unit where the patient is located.     Kelly Garza MD

## 2017-08-01 NOTE — PROGRESS NOTES
Bedside and Verbal shift change report given to Axel Jones RN (oncoming nurse) by Ekaterina Saenz RN (offgoing nurse).

## 2017-08-01 NOTE — PROGRESS NOTES
Patient instructed to use of IS, pulls approx 1750. Oxygen on 2 liters 99%. Oxygen removed with attempt to wean.

## 2017-08-01 NOTE — PROGRESS NOTES
Problem: Patient Education: Go to Patient Education Activity  Goal: Patient/Family Education  LTG:  (1.)Mr. Farias will move from supine to sit and sit to supine, scoot up and down and roll side to side with INDEPENDENCE within 7 day(s). (2.)Mr. Farias will transfer from bed to chair and chair to bed with MODIFIED INDEPENDENCE within 7 day(s). (3.)Mr. Farias will ambulate with MODIFIED INDEPENDENCE for 1000+ feet within 7 day(s). (4.)Mr. Farias will participate in 25+ minutes of therapeutic activity/exercise while maintaining stable vital signs to improve functional mobility and activity tolerance within 7 day(s). (5.)Mr. Farias will negotiate 20 stairs using unilateral handrail with SUPERVISION within 7 day(s). ________________________________________________________________________________________________      PHYSICAL THERAPY: INITIAL ASSESSMENT, PM 8/1/2017  INPATIENT: Hospital Day: 2  Payor: BLUE CROSS / [place] [facility] / Product Type: PPO /      Sternal precautions     NAME/AGE/GENDER: Lindsay Coto is a 59 y.o. male           PRIMARY DIAGNOSIS: Atherosclerosis of native coronary artery of native heart with unstable angina pectoris ([facility] [address] [I25.110] Coronary artery disease involving native coronary artery of native heart with unstable angina pectoris ([facility] [address] Coronary artery disease involving native coronary artery of native heart with unstable angina pectoris (HCC)  Procedure(s) (LRB):  CORONARY ARTERY BYPASS GRAFT X 2, LIMA   (N/A)  VEIN HARVEST GREATER SAPHENOUS (Left)  ESOPHAGEAL TRANS ECHOCARDIOGRAM (N/A)  1 Day Post-Op  ICD-10: Treatment Diagnosis:       · Generalized Muscle Weakness (M62.81)  · Other lack of cordination (R27.8)  · Difficulty in walking, Not elsewhere classified (R26.2)   Precaution/Allergies:  Review of patient's allergies indicates no known allergies.        ASSESSMENT:      Mr. Sergio Waddell is a 59year old female admitted with CAD and is 1 day s/p CABG x2. Geoffrey Dave saphenous vein harvested from LLE. Patient seen this PM for initial physical therapy evaluation: presents seated in recliner chair with wife at bedside, endorses 4/10 chest/incisional pain, and agreeable to therapy. Patient lives with wife and son in a two story residence with 0 steps to enter and 20 steps up to bedroom/bathroom. At baseline, patient is independent with ADL's and IADL's, works, drives, and ambulates independently. Reports no falls. Today, B UE and LE strength decreased functionally and L LE AROM slightly decreased due to pain from saphenous vein graft. Sensation intact to light touch C4-T1 and L3-S1 B. Educated on sternal precautions and using pillow to brace. Patient performed sit to stand transfer hugging pillow to chest with CGA. Ambulated x60ft with CGA demonstrating slow gait pattern with wide BRIAN, decreased step length B, and decreased trunk sway. Patient then transferred back to sitting EOB and experienced SOB. Educated on pursed lip breathing. O2 sats monitored at EOB with portable pulse ox: at 84% but quickly kike to 91%. Eagle monitor pulse ox reads 96% after couple mins of rest. Patient then transferred back to supine in bed with CGA-min assist and all needs met. Mr. Charmayne Gelinas presents with decreased functional mobility and balance/gait status from baseline. Recommend continued skilled PT services to address stated deficits. Will follow and progress toward stated goals during acute stay. This section established at most recent assessment   PROBLEM LIST (Impairments causing functional limitations):  1. Decreased Strength  2. Decreased ADL/Functional Activities  3. Decreased Transfer Abilities  4. Decreased Ambulation Ability/Technique  5. Decreased Balance  6. Increased Pain  7. Decreased Activity Tolerance  8. Increased Shortness of Breath    INTERVENTIONS PLANNED: (Benefits and precautions of physical therapy have been discussed with the patient.)  1. Balance Exercise  2.  Bed Mobility  3. Family Education  4. Gait Training  5. Therapeutic Activites  6. Therapeutic Exercise/Strengthening  7. Transfer Training      TREATMENT PLAN: Frequency/Duration: twice daily for duration of hospital stay  Rehabilitation Potential For Stated Goals: GOOD      RECOMMENDED REHABILITATION/EQUIPMENT: (at time of discharge pending progress): Continue Skilled Therapy and Home Health: Physical Therapy. HISTORY:   History of Present Injury/Illness (Reason for Referral):  Mr. Reji Espinosa is a 59year old female admitted with CAD and is 1 day s/p CABG x2. Great saphenous vein harvested from LLE. Past Medical History/Comorbidities:   Mr. Reji Espinosa  has a past medical history of CAD (coronary artery disease); Hypercholesterolemia; Hypertension; Leukopenia; and Rheumatic fever. He also has no past medical history of Adverse effect of anesthesia; Difficult intubation; Malignant hyperthermia due to anesthesia; Nausea & vomiting; or Pseudocholinesterase deficiency. Mr. Reji Espinosa  has a past surgical history that includes orthopaedic; heart catheterization; tonsillectomy; colonoscopy; and endoscopy. Social History/Living Environment:   Home Environment: Private residence  # Steps to Enter: 0  One/Two Story Residence: Two story  # of Interior Steps: 21  Interior Rails: Right  Lift Chair Available: No  Living Alone: No  Support Systems: Spouse/Significant Other/Partner, Family member(s), Child(peter)  Patient Expects to be Discharged to[de-identified] Private residence  Current DME Used/Available at Home: None  Tub or Shower Type:  (Tub/shower and walk in shower)  Prior Level of Function/Work/Activity:  Patient lives with wife and son in a two story residence with 0 steps to enter and 20 steps up to bedroom/bathroom. At baseline, patient is independent with ADL's and IADL's, works, drives, and ambulates independently. Reports no falls.       Number of Personal Factors/Comorbidities that affect the Plan of Care: 3+: HIGH COMPLEXITY   EXAMINATION:   Most Recent Physical Functioning:   Gross Assessment:  AROM: Within functional limits  PROM:  (Not tested)  Strength: Generally decreased, functional  Coordination:  (Not tested)  Tone:  (Not tested)  Sensation: Intact               Posture:  Posture (WDL): Within defined limits  Balance:  Sitting: Intact  Standing: Impaired  Standing - Static: Fair  Standing - Dynamic : Fair Bed Mobility:  Rolling: Contact guard assistance  Sit to Supine: Contact guard assistance  Scooting: Contact guard assistance  Interventions: Safety awareness training;Verbal cues  Wheelchair Mobility:     Transfers:  Sit to Stand: Contact guard assistance  Stand to Sit: Contact guard assistance  Bed to Chair: Contact guard assistance  Interventions: Safety awareness training;Verbal cues; Tactile cues  Gait:     Base of Support: Widened  Speed/Luann: Slow  Step Length: Right shortened;Left shortened  Gait Abnormalities: Decreased step clearance; Altered arm swing  Distance (ft): 60 Feet (ft)  Ambulation - Level of Assistance: Contact guard assistance       Body Structures Involved:  1. Heart Body Functions Affected:  1. Cardio  2. Hematological Activities and Participation Affected:  1. General Tasks and Demands  2. Mobility  3. Self Care   Number of elements that affect the Plan of Care: 4+: HIGH COMPLEXITY   CLINICAL PRESENTATION:   Presentation: Evolving clinical presentation with changing clinical characteristics: MODERATE COMPLEXITY   CLINICAL DECISION MAKIN [facility] Inpatient Short Form  How much difficulty does the patient currently have. .. Unable A Lot A Little None   1. Turning over in bed (including adjusting bedclothes, sheets and blankets)? [ ] 1   [ ] 2   [ ] 3   [X] 4   2. Sitting down on and standing up from a chair with arms ( e.g., wheelchair, bedside commode, etc.)   [ ] 1   [ ] 2   [ ] 3   [X] 4   3.   Moving from lying on back to sitting on the side of the bed? [ ] 1   [ ] 2   [X] 3   [ ] 4   How much help from another person does the patient currently need. .. Total A Lot A Little None   4. Moving to and from a bed to a chair (including a wheelchair)? [ ] 1   [ ] 2   [ ] 3   [X] 4   5. Need to walk in hospital room? [ ] 1   [ ] 2   [ ] 3   [X] 4   6. Climbing 3-5 steps with a railing? [ ] 1   [ ] 2   [X] 3   [ ] 4   © 2007, Trustees of [address] [place] [address] [address] [address], under license to [employer]. All rights reserved    Score:  Initial: 22 Most Recent: X (Date: -- )     Interpretation of Tool:  Represents activities that are increasingly more difficult (i.e. Bed mobility, Transfers, Gait). Score 24 23 22-20 19-15 14-10 9-7 6       Modifier CH CI CJ CK CL CM CN         · Mobility - Walking and Moving Around:               - CURRENT STATUS:    CJ - 20%-39% impaired, limited or restricted               - GOAL STATUS:           CI - 1%-19% impaired, limited or restricted               - D/C STATUS:                       ---------------To be determined---------------  Payor: BLUE CR[place] / [place] [facility] / Product Type: PPO /       Medical Necessity:     · Patient demonstrates good rehab potential due to higher previous functional level. Reason for Services/Other Comments:  · Patient continues to require skilled intervention due to decreased functional mobility, balance/gait status, and activity tolerance.    Use of outcome tool(s) and clinical judgement create a POC that gives a: Clear prediction of patient's progress: LOW COMPLEXITY                 TREATMENT:   (In addition to Assessment/Re-Assessment sessions the following treatments were rendered)   Pre-treatment Symptoms/Complaints:  Reports 4/10 chest/incisional pain this PM  Pain: Initial:   Pain Intensity 1: 4  Pain Location 1: Chest, Incisional  Pain Orientation 1: Mid  Pain Intervention(s) 1: Emotional support, Repositioned  Post Session:  Increased SOB with mobility which subsided after few mins of rest; chest pain 6/10 post-session      Assessment/Reassessment only, no treatment provided today     Braces/Orthotics/Lines/Etc:   · O2 Device: Room air  Treatment/Session Assessment:    · Response to Treatment:  See above. · Interdisciplinary Collaboration:  · Physical Therapist  · Registered Nurse  · SDPT  · After treatment position/precautions:  · Supine in bed  · Bed/Chair-wheels locked  · Bed in low position  · Call light within reach  · RN notified  · Family at bedside  · Compliance with Program/Exercises: Will assess as treatment progresses. · Recommendations/Intent for next treatment session: \"Next visit will focus on advancements to more challenging activities\".   Total Treatment Duration:  PT Patient Time In/Time Out  Time In: 1352  Time Out: 800 Hospital , PT, DPT

## 2017-08-01 NOTE — PROGRESS NOTES
Pt's left radial art line removed at this time. Manual pressure held until hemostasis achieved. No bleeding or hematoma at site. Pressure dressing to site. Will monitor. Pt's [place] removed at this time. Pt given instructions for [place] pull and Pt v/u. [place] removed without difficulty, no ectopy seen on monitor. Line hub capped. Pt tolerated both procedures well. No acute distress noted. VSS throughout. Pt placed on NIBP Q 15min. Will monitor.

## 2017-08-02 ENCOUNTER — APPOINTMENT (OUTPATIENT)
Dept: CT IMAGING | Age: 62
DRG: 235 | End: 2017-08-02
Attending: PHYSICIAN ASSISTANT
Payer: COMMERCIAL

## 2017-08-02 PROBLEM — I65.21 STENOSIS OF RIGHT CAROTID ARTERY WITHOUT CEREBRAL INFARCTION: Status: ACTIVE | Noted: 2017-08-02

## 2017-08-02 PROBLEM — I65.22 STENOSIS OF LEFT CAROTID ARTERY WITHOUT CEREBRAL INFARCTION: Chronic | Status: ACTIVE | Noted: 2017-08-02

## 2017-08-02 LAB
ATRIAL RATE: 79 BPM
CALCULATED P AXIS, ECG09: 41 DEGREES
CALCULATED R AXIS, ECG10: 12 DEGREES
CALCULATED T AXIS, ECG11: 12 DEGREES
DIAGNOSIS, 93000: NORMAL
ERYTHROCYTE [DISTWIDTH] IN BLOOD BY AUTOMATED COUNT: 12.7 % (ref 11.9–14.6)
GLUCOSE BLD STRIP.AUTO-MCNC: 120 MG/DL (ref 65–100)
HCT VFR BLD AUTO: 31.1 % (ref 41.1–50.3)
HGB BLD-MCNC: 10.5 G/DL (ref 13.6–17.2)
MAGNESIUM SERPL-MCNC: 2.2 MG/DL (ref 1.8–2.4)
MCH RBC QN AUTO: 31.3 PG (ref 26.1–32.9)
MCHC RBC AUTO-ENTMCNC: 33.8 G/DL (ref 31.4–35)
MCV RBC AUTO: 92.8 FL (ref 79.6–97.8)
MM INDURATION POC: 0 MM (ref 0–5)
P-R INTERVAL, ECG05: 142 MS
PLATELET # BLD AUTO: 111 K/UL (ref 150–450)
PMV BLD AUTO: 9.4 FL (ref 10.8–14.1)
POTASSIUM SERPL-SCNC: 4 MMOL/L (ref 3.5–5.1)
PPD POC: NORMAL NEGATIVE
Q-T INTERVAL, ECG07: 382 MS
QRS DURATION, ECG06: 86 MS
QTC CALCULATION (BEZET), ECG08: 438 MS
RBC # BLD AUTO: 3.35 M/UL (ref 4.23–5.67)
VENTRICULAR RATE, ECG03: 79 BPM
WBC # BLD AUTO: 5.9 K/UL (ref 4.3–11.1)

## 2017-08-02 PROCEDURE — 83735 ASSAY OF MAGNESIUM: CPT | Performed by: THORACIC SURGERY (CARDIOTHORACIC VASCULAR SURGERY)

## 2017-08-02 PROCEDURE — 74011250637 HC RX REV CODE- 250/637: Performed by: NURSE PRACTITIONER

## 2017-08-02 PROCEDURE — 74011000258 HC RX REV CODE- 258: Performed by: THORACIC SURGERY (CARDIOTHORACIC VASCULAR SURGERY)

## 2017-08-02 PROCEDURE — 94760 N-INVAS EAR/PLS OXIMETRY 1: CPT

## 2017-08-02 PROCEDURE — 94640 AIRWAY INHALATION TREATMENT: CPT

## 2017-08-02 PROCEDURE — 82962 GLUCOSE BLOOD TEST: CPT

## 2017-08-02 PROCEDURE — 74011636320 HC RX REV CODE- 636/320: Performed by: THORACIC SURGERY (CARDIOTHORACIC VASCULAR SURGERY)

## 2017-08-02 PROCEDURE — 36415 COLL VENOUS BLD VENIPUNCTURE: CPT | Performed by: THORACIC SURGERY (CARDIOTHORACIC VASCULAR SURGERY)

## 2017-08-02 PROCEDURE — 93005 ELECTROCARDIOGRAM TRACING: CPT | Performed by: THORACIC SURGERY (CARDIOTHORACIC VASCULAR SURGERY)

## 2017-08-02 PROCEDURE — 74011000250 HC RX REV CODE- 250: Performed by: NURSE PRACTITIONER

## 2017-08-02 PROCEDURE — 70496 CT ANGIOGRAPHY HEAD: CPT

## 2017-08-02 PROCEDURE — 97110 THERAPEUTIC EXERCISES: CPT

## 2017-08-02 PROCEDURE — 84132 ASSAY OF SERUM POTASSIUM: CPT | Performed by: THORACIC SURGERY (CARDIOTHORACIC VASCULAR SURGERY)

## 2017-08-02 PROCEDURE — 65660000004 HC RM CVT STEPDOWN

## 2017-08-02 PROCEDURE — 77030012891

## 2017-08-02 PROCEDURE — 99232 SBSQ HOSP IP/OBS MODERATE 35: CPT | Performed by: INTERNAL MEDICINE

## 2017-08-02 PROCEDURE — 74011000250 HC RX REV CODE- 250: Performed by: THORACIC SURGERY (CARDIOTHORACIC VASCULAR SURGERY)

## 2017-08-02 PROCEDURE — 85027 COMPLETE CBC AUTOMATED: CPT | Performed by: THORACIC SURGERY (CARDIOTHORACIC VASCULAR SURGERY)

## 2017-08-02 PROCEDURE — 74011250637 HC RX REV CODE- 250/637: Performed by: THORACIC SURGERY (CARDIOTHORACIC VASCULAR SURGERY)

## 2017-08-02 RX ORDER — GUAIFENESIN 600 MG/1
600 TABLET, EXTENDED RELEASE ORAL EVERY 12 HOURS
Status: DISCONTINUED | OUTPATIENT
Start: 2017-08-02 | End: 2017-08-03 | Stop reason: HOSPADM

## 2017-08-02 RX ORDER — AMOXICILLIN 250 MG
2 CAPSULE ORAL DAILY
Status: DISCONTINUED | OUTPATIENT
Start: 2017-08-02 | End: 2017-08-03 | Stop reason: HOSPADM

## 2017-08-02 RX ORDER — IPRATROPIUM BROMIDE AND ALBUTEROL SULFATE 2.5; .5 MG/3ML; MG/3ML
3 SOLUTION RESPIRATORY (INHALATION) 2 TIMES DAILY
Status: COMPLETED | OUTPATIENT
Start: 2017-08-02 | End: 2017-08-02

## 2017-08-02 RX ORDER — IPRATROPIUM BROMIDE AND ALBUTEROL SULFATE 2.5; .5 MG/3ML; MG/3ML
3 SOLUTION RESPIRATORY (INHALATION) 2 TIMES DAILY
Status: DISCONTINUED | OUTPATIENT
Start: 2017-08-02 | End: 2017-08-02

## 2017-08-02 RX ORDER — SODIUM CHLORIDE 0.9 % (FLUSH) 0.9 %
10 SYRINGE (ML) INJECTION
Status: COMPLETED | OUTPATIENT
Start: 2017-08-02 | End: 2017-08-02

## 2017-08-02 RX ADMIN — GUAIFENESIN 600 MG: 600 TABLET, EXTENDED RELEASE ORAL at 20:17

## 2017-08-02 RX ADMIN — METOPROLOL TARTRATE 25 MG: 25 TABLET ORAL at 20:16

## 2017-08-02 RX ADMIN — MUPIROCIN: 20 OINTMENT TOPICAL at 08:26

## 2017-08-02 RX ADMIN — IPRATROPIUM BROMIDE AND ALBUTEROL SULFATE 3 ML: 2.5; .5 SOLUTION RESPIRATORY (INHALATION) at 11:25

## 2017-08-02 RX ADMIN — Medication 10 ML: at 06:03

## 2017-08-02 RX ADMIN — FAMOTIDINE 20 MG: 20 TABLET ORAL at 20:17

## 2017-08-02 RX ADMIN — SODIUM CHLORIDE 100 ML: 900 INJECTION, SOLUTION INTRAVENOUS at 15:53

## 2017-08-02 RX ADMIN — OXYCODONE HYDROCHLORIDE AND ACETAMINOPHEN 1 TABLET: 5; 325 TABLET ORAL at 03:37

## 2017-08-02 RX ADMIN — OXYCODONE HYDROCHLORIDE AND ACETAMINOPHEN 1 TABLET: 10; 325 TABLET ORAL at 19:37

## 2017-08-02 RX ADMIN — Medication 10 ML: at 13:50

## 2017-08-02 RX ADMIN — AMIODARONE HYDROCHLORIDE 200 MG: 200 TABLET ORAL at 20:17

## 2017-08-02 RX ADMIN — TRAMADOL HYDROCHLORIDE 50 MG: 50 TABLET, FILM COATED ORAL at 22:49

## 2017-08-02 RX ADMIN — POTASSIUM CHLORIDE 20 MEQ: 20 TABLET, EXTENDED RELEASE ORAL at 17:37

## 2017-08-02 RX ADMIN — Medication 10 ML: at 20:21

## 2017-08-02 RX ADMIN — POTASSIUM CHLORIDE 20 MEQ: 20 TABLET, EXTENDED RELEASE ORAL at 08:26

## 2017-08-02 RX ADMIN — MUPIROCIN: 20 OINTMENT TOPICAL at 20:19

## 2017-08-02 RX ADMIN — FAMOTIDINE 20 MG: 20 TABLET ORAL at 08:25

## 2017-08-02 RX ADMIN — OXYCODONE HYDROCHLORIDE AND ACETAMINOPHEN 1 TABLET: 10; 325 TABLET ORAL at 10:09

## 2017-08-02 RX ADMIN — GUAIFENESIN 600 MG: 600 TABLET, EXTENDED RELEASE ORAL at 10:09

## 2017-08-02 RX ADMIN — IOPAMIDOL 80 ML: 755 INJECTION, SOLUTION INTRAVENOUS at 15:53

## 2017-08-02 RX ADMIN — METOPROLOL TARTRATE 25 MG: 25 TABLET ORAL at 06:02

## 2017-08-02 RX ADMIN — Medication 10 ML: at 15:53

## 2017-08-02 RX ADMIN — ATORVASTATIN CALCIUM 80 MG: 40 TABLET, FILM COATED ORAL at 20:16

## 2017-08-02 RX ADMIN — TRAMADOL HYDROCHLORIDE 50 MG: 50 TABLET, FILM COATED ORAL at 16:28

## 2017-08-02 RX ADMIN — ACETAMINOPHEN 650 MG: 325 TABLET ORAL at 17:42

## 2017-08-02 RX ADMIN — STANDARDIZED SENNA CONCENTRATE AND DOCUSATE SODIUM 2 TABLET: 8.6; 5 TABLET, FILM COATED ORAL at 10:09

## 2017-08-02 RX ADMIN — IPRATROPIUM BROMIDE AND ALBUTEROL SULFATE 3 ML: 2.5; .5 SOLUTION RESPIRATORY (INHALATION) at 19:35

## 2017-08-02 RX ADMIN — AMIODARONE HYDROCHLORIDE 200 MG: 200 TABLET ORAL at 08:25

## 2017-08-02 RX ADMIN — TRAMADOL HYDROCHLORIDE 50 MG: 50 TABLET, FILM COATED ORAL at 06:08

## 2017-08-02 RX ADMIN — ASPIRIN 81 MG 81 MG: 81 TABLET ORAL at 08:25

## 2017-08-02 RX ADMIN — METOPROLOL TARTRATE 25 MG: 25 TABLET ORAL at 13:49

## 2017-08-02 RX ADMIN — OXYCODONE HYDROCHLORIDE AND ACETAMINOPHEN 1 TABLET: 10; 325 TABLET ORAL at 15:41

## 2017-08-02 NOTE — PROGRESS NOTES
Hoa Sorensen  Admission Date: 7/31/2017             Daily Progress Note: 8/2/2017    The patient's chart is reviewed and the patient is discussed with the staff. Patient s/p CABG. Subjective:     Currently on room air. Sitting up in chair. No new complaints.      Current Facility-Administered Medications   Medication Dose Route Frequency    guaiFENesin ER (MUCINEX) tablet 600 mg  600 mg Oral Q12H    albuterol-ipratropium (DUO-NEB) 2.5 MG-0.5 MG/3 ML  3 mL Nebulization BID    senna-docusate (PERICOLACE) 8.6-50 mg per tablet 2 Tab  2 Tab Oral DAILY    sodium chloride (NS) flush 5-10 mL  5-10 mL IntraVENous Q8H    sodium chloride (NS) flush 5-10 mL  5-10 mL IntraVENous PRN    alum-mag hydroxide-simeth (MYLANTA) oral suspension 30 mL  30 mL Oral Q4H PRN    acetaminophen (TYLENOL) tablet 650 mg  650 mg Oral Q4H PRN    zolpidem (AMBIEN) tablet 5 mg  5 mg Oral QHS PRN    atorvastatin (LIPITOR) tablet 80 mg  80 mg Oral QHS    magnesium oxide (MAG-OX) tablet 400 mg  400 mg Oral TID PRN    magnesium oxide (MAG-OX) tablet 400 mg  400 mg Oral QID PRN    potassium chloride (K-DUR, KLOR-CON) SR tablet 20 mEq  20 mEq Oral BID PRN    potassium chloride (K-DUR, KLOR-CON) SR tablet 40 mEq  40 mEq Oral BID PRN    famotidine (PEPCID) tablet 20 mg  20 mg Oral Q12H    mupirocin (BACTROBAN) 2 % ointment   Both Nostrils Q12H    READ PPD  1 Each Other Q24H    traMADol (ULTRAM) tablet 50 mg  50 mg Oral Q6H PRN    oxyCODONE-acetaminophen (PERCOCET) 5-325 mg per tablet 1 Tab  1 Tab Oral Q4H PRN    metoprolol tartrate (LOPRESSOR) tablet 25 mg  25 mg Oral Q8H    oxyCODONE-acetaminophen (PERCOCET 10)  mg per tablet 1 Tab  1 Tab Oral Q4H PRN    amiodarone (CORDARONE) tablet 200 mg  200 mg Oral Q12H    aspirin chewable tablet 81 mg  81 mg Oral DAILY       Review of Systems  Constitutional: negative for fever, chills, sweats  Cardiovascular: negative for chest pain, palpitations, syncope, edema  Gastrointestinal: negative for dysphagia, reflux, vomiting, diarrhea, abdominal pain, or melena  Neurologic: negative for focal weakness, numbness, headache    Objective:     Vitals:    08/02/17 0324 08/02/17 0602 08/02/17 0656 08/02/17 0709   BP: 117/67 134/69 112/65    Pulse: 92 89 79    Resp: 16  16    Temp: 98.8 °F (37.1 °C)  98.5 °F (36.9 °C)    SpO2: 94%  97% 94%   Weight: 166 lb (75.3 kg)      Height:         Intake and Output:   07/31 1901 - 08/02 0700  In: 2165.8 [P.O.:1560; I.V.:605.8]  Out: 2063 [Urine:2007]  08/02 0701 - 08/02 1900  In: 480 [P.O.:480]  Out: -     Physical Exam:   Constitution:  the patient is well developed and in no acute distress  EENMT:  Sclera clear, pupils equal, oral mucosa moist  Respiratory: Minimal R basal crackles  Cardiovascular:  RRR without M,G,R  Gastrointestinal: soft and non-tender; with positive bowel sounds. Musculoskeletal: warm without cyanosis. There is no lower leg edema. Skin:  no jaundice or rashes, surgical wounds   Neurologic: no gross neuro deficits     Psychiatric:  alert and oriented x ppt    CHEST XRAY:   No new imaging    LAB  No lab exists for component: [place]   Recent Labs      08/02/17 0350 08/01/17 0215 07/31/17 2200 07/31/17   1810  07/31/17   1415   WBC  5.9  5.0   --    --   4.7   HGB  10.5*  11.1*  11.3*  11.8*  11.8*   HCT  31.1*  32.3*  33.4*  34.5*  35.5*   PLT  111*  112*   --    --   103*   INR   --    --    --    --   1.2     Recent Labs      08/02/17   0350 08/01/17 0215 07/31/17   2200 07/31/17   1415   NA   --   144   --    --   144   K  4.0  4.1  4.2   < >  3.8   CL   --   112*   --    --   109*   CO2   --   22   --    --   25   GLU   --   133*   --    --   87   BUN   --   14   --    --   18   CREA   --   0.72*   --    --   0.74*   MG  2.2  2.4  2.5*   < >  3.4*   CA   --   6.9*   --    --   7.2*    < > = values in this interval not displayed.      Recent Labs      07/31/17   1415   PH  7.37   PCO2  42   PO2  270* HCO3  24       Assessment:  (Medical Decision Making)     Hospital Problems  Date Reviewed: 7/31/2017          Codes Class Noted POA    * (Principal)Coronary artery disease involving native coronary artery of native heart with unstable angina pectoris ([facility] [address] ICD-10-CM: I25.110  ICD-9-CM: 414.01, 411.1  7/31/2017 Yes    Overview Signed 8/1/2017  9:32 AM by Percy Fontana, IRIS     7/31/17 (Dr Juan Mart) CORONARY ARTERY BYPASS GRAFT X 2, LIMA to the LAD, SVG to the (L) PDA    VEIN HARVEST GREATER SAPHENOUS  ESOPHAGEAL TRANS ECHOCARDIOGRAM             Hypoxia ICD-10-CM: R09.02  ICD-9-CM: 799.02  7/31/2017 No        S/P CABG (coronary artery bypass graft) ICD-10-CM: Z95.1  ICD-9-CM: V45.81  7/31/2017 No        Hypertension (Chronic) ICD-10-CM: I10  ICD-9-CM: 401.9  Unknown Yes        Hypercholesterolemia (Chronic) ICD-10-CM: E78.00  ICD-9-CM: 272.0  Unknown Yes            Patient progressing well from pulmonary standpoint. Plan:  (Medical Decision Making)   -we will sign off but will be available should new issues arise.        Corene Spurling, MD

## 2017-08-02 NOTE — DISCHARGE SUMMARY
Physician Discharge Summary     Patient: Valeria Olvera MRN: [id number]  SSN: [id number]    YOB: 1955  Age: 59 y.o.   Sex: male       Admit Date: 7/31/2017    Discharge Date: 8/3/2017      Admitting Physician: Jameel Mosher MD     Discharge Physician: Sussy Dawn NP    Admission Diagnoses: Atherosclerosis of native coronary artery of native heart with unstable angina pectoris [facility]) [I25.110]    Discharge Diagnoses:   Problem List as of 8/3/2017  Date Reviewed: 8/3/2017          Codes Class Noted - Resolved    Stenosis of right carotid artery without cerebral infarction ICD-10-CM: I65.21  ICD-9-CM: 433.10  8/2/2017 - Present        * (Principal)Coronary artery disease involving native coronary artery of native heart with unstable angina pectoris ([facility] ICD-10-CM: I25.110  ICD-9-CM: 414.01, 411.1  7/31/2017 - Present    Overview Signed 8/1/2017  9:32 AM by Sussy Dawn NP     7/31/17 (Dr Precious Yoon) CORONARY ARTERY BYPASS GRAFT X 2, LIMA to the LAD, SVG to the (L) PDA    VEIN HARVEST GREATER SAPHENOUS  ESOPHAGEAL TRANS ECHOCARDIOGRAM             S/P CABG (coronary artery bypass graft) ICD-10-CM: Z95.1  ICD-9-CM: V45.81  7/31/2017 - Present        Shortness of breath ICD-10-CM: R06.02  ICD-9-CM: 786.05  7/10/2017 - Present        Family history of ischemic heart disease (IHD) (Chronic) ICD-10-CM: Z82.49  ICD-9-CM: V17.3  7/10/2017 - Present        Hypertension (Chronic) ICD-10-CM: I10  ICD-9-CM: 401.9  Unknown - Present        Hypercholesterolemia (Chronic) ICD-10-CM: E78.00  ICD-9-CM: 272.0  Unknown - Present        RESOLVED: Respiratory failure, post-operative ([facility] ICD-10-CM: X56.835  ICD-9-CM: 518.51  7/31/2017 - 8/1/2017        RESOLVED: Hypoxia ICD-10-CM: R09.02  ICD-9-CM: 799.02  7/31/2017 - 8/3/2017               Procedures for this admission: Procedure(s):  CORONARY ARTERY BYPASS GRAFT X 2, LIMA    VEIN HARVEST GREATER SAPHENOUS  ESOPHAGEAL TRANS ECHOCARDIOGRAM    Discharged Condition: stable    Hospital Course: The patient is an 59 y.o. male with coronary artery disease requiring surgical revascularization. The patient tolerated the surgery well and transitioned to stepdown POD 1. His recovery has been very uneventful with him remaining in NSR, afebrile, and hemodynamically stable. All incisions are healing. He has done well participating in PT and IS. Pain is well controlled on oral medications. He is stable on Beta blocker, Statin, and ASA. Home Benazepril was not restarted, rate and BP controlled with BB at present, this may change once discharged. He is stable for discharge today. Consults: Cardiac Rehab, Pulmonary/Critical Care and Vascular Surgery (RIGHT ICA STENOSIS)    Significant Diagnostic Studies: labs  HgbA1C: 5.2  microbiology  radiology  cardiac graphics: Telemetry and EKG showing ST elevation likely related to pericarditis. Treatments:   Cardiac Meds: metoprolol and Toprol XL, furosemide, amiodarone and potassium. Anticoagulation: ASA  Statins: Lipitor changed to Zocor on discharge  IV Hydration  Antibiotics: perioperative  Analgesia  Insulin: Regular - Sliding Scale  Respiratory Therapy: O2  Therapies: PT  Surgery: as above    Discharge Exam:        08/03/17 0233 08/03/17 0235 08/03/17 0601 08/03/17 0656   BP:     106/57 123/63   Pulse:     94 84   Resp:       19   Temp:       98.4 °F (36.9 °C)   SpO2: 92%     92%   Weight:   162 lb 3.2 oz (73.6 kg)       Height:              Physical Exam:             GEN: well developed and in no acute distress,   HEENT:  PERRL, EOMI, no alar flaring or epistaxis, oral mucosa moist without cyanosis,   NECK:  no JVD, no retractions, no thyromegaly or masses,   LUNGS:  Clear, diminished at bases  CHEST: incision dry/healing with stable ecchymosis.    HEART:  RRR with no M,G,R; ST elevation/endocarditis  ABDOMEN:  soft with no tenderness, positive bowel sounds present  EXTREMITIES:  warm with no cyanosis, no edema  SKIN:  no jaundice or ecchymosis   NEURO:  alert and oriented, grossly non-focal         LAB:           Recent Labs       08/03/17   0325  08/02/17   0350  08/01/17 0215  07/31/17   2200    07/31/17   1415   WBC   --   5.9  5.0   --    --   4.7   HGB   --   10.5*  11.1*  11.3*   < >  11.8*   HCT   --   31.1*  32.3*  33.4*   < >  35.5*   PLT  108*  111*  112*   --    --   103*    < > = values in this interval not displayed.              Recent Labs       08/03/17   0325  08/02/17   0350 08/01/17 0215 07/31/17   1415   NA   --    --   144   --   144   K  3.8  4.0  4.1   < >  3.8   CL   --    --   112*   --   109*   GLU   --    --   133*   --   87   CO2   --    --   22   --   25   BUN   --    --   14   --   18   CREA   --    --   0.72*   --   0.74*   MG  2.2  2.2  2.4   < >  3.4*   INR   --    --    --    --   1.2    < > = values in this interval not displayed.          Recent Labs       07/31/17   1415   PH  7.37   PCO2  42   PO2  270*   HCO3  24        Disposition: [place] Health Care [facility]     Patient Instructions:   Discharge Medication List as of 8/3/2017  9:48 AM      START taking these medications    Details   acetaminophen (TYLENOL) 325 mg tablet Over the counter medication. May take for pain as needed as on label, No Print, Disp-1 Tab, R-0      guaiFENesin ER (MUCINEX) 600 mg ER tablet Over the counter mucinex. Take as needed to keep secretions thin and easy to cough up, No Print, Disp-1 Tab, R-0      oxyCODONE-acetaminophen (PERCOCET) 5-325 mg per tablet Take 1 Tab by mouth every four (4) hours as needed. Max Daily Amount: 6 Tabs., No Print, Disp-30 Tab, R-0      senna-docusate (PERICOLACE) 8.6-50 mg per tablet Over the counter medicine, take as needed for constipation, No Print, Disp-1 Tab, R-0      traMADol (ULTRAM) 50 mg tablet Take 1 Tab by mouth every six (6) hours as needed. Max Daily Amount: 200 mg., Print, Disp-30 Tab, R-0      metoprolol succinate (TOPROL-XL) 100 mg tablet Take 1 Tab by mouth daily. , Print, Disp-30 Tab, R-0         CONTINUE these medications which have NOT CHANGED    Details   OMEGA-3 FATTY ACIDS/FISH OIL (FISH OIL EXTRA STRENGTH PO) Take 2,400 mg by mouth daily. Stop seven days prior to surgery per anesthesia protocol., Historical Med      calcium carbonate (CALTREX) 600 mg calcium (1,500 mg) tablet Take 600 mg by mouth daily. Stop seven days prior to surgery per anesthesia protocol., Historical Med      multivitamin (ONE A DAY) tablet Take 1 Tab by mouth daily. Stop seven days prior to surgery per anesthesia protocol., Historical Med      b complex vitamins (B COMPLEX 1) tablet Take 1 Tab by mouth daily. Stop seven days prior to surgery per anesthesia protocol., Historical Med      aspirin delayed-release 81 mg tablet Take 81 mg by mouth every morning. Take day of surgery per anesthesia protocol., Historical Med      simvastatin (ZOCOR) 80 mg tablet Take 80 mg by mouth every morning. Take day of surgery per anesthesia protocol. Indications: hypercholesterolemia, Historical Med         STOP taking these medications       benazepril (LOTENSIN) 20 mg tablet Comments:   Reason for Stopping:         metoprolol tartrate (LOPRESSOR) 25 mg tablet Comments:   Reason for Stopping:         METOPROLOL TARTRATE (LOPRESSOR PO) Comments:   Reason for Stopping:         AMIODARONE HCL (AMIODARONE PO) Comments:   Reason for Stopping:         mupirocin calcium (BACTROBAN) 2 % nasal ointment Comments:   Reason for Stopping:         benazepril (LOTENSIN) 40 mg tablet Comments:   Reason for Stopping:               Reference discharge instructions as provided by nursing for diet, labs, medications, activity, wound care and any outpatient referrals. Follow-up Appointments   Procedures    FOLLOW UP VISIT Appointment in: [facility] [address] [facility]     Standing Status:   Standing     Number of Occurrences:   1     Order Specific Question:   Appointment in     Answer:    One Week    FOLLOW UP VISIT Appointment in: Other (Specify) Dr Chary Beltre     Standing Status:   Standing     Number of Occurrences:   1     Standing Expiration Date:   8/4/2017     Order Specific Question:   Appointment in     Answer: Other (Specify)    FOLLOW UP VISIT Appointment in: One Month Dr Brandi Arias Carotid stenosis- hospital follow up     Dr Brandi Arias Carotid stenosis- hospital follow up     Standing Status:   Standing     Number of Occurrences:   1     Standing Expiration Date:   8/4/2017     Order Specific Question:   Appointment in     Answer:   One Month        Signed:  John Fernandez NP  8/3/2017  5:18 PM              8/16/2017     1:11 PM    I have personally seen and examined Amanda Pacheco with  ESTEFANIA Carrillo. I agree and confirm findings in history, physical exam, and assessment/plan as outlined above, except as noted below.     Rima Bansal MD

## 2017-08-02 NOTE — PROGRESS NOTES
Bedside report received from Lorrie Boyle RN. PM assessment complete. Denies any needs at this time.

## 2017-08-02 NOTE — PROGRESS NOTES
Problem: Patient Education: Go to Patient Education Activity  Goal: Patient/Family Education  LTG:  (1.)Mr. Farias will move from supine to sit and sit to supine, scoot up and down and roll side to side with INDEPENDENCE within 7 day(s). (2.)Mr. Farias will transfer from bed to chair and chair to bed with MODIFIED INDEPENDENCE within 7 day(s). (3.)Mr. Farias will ambulate with MODIFIED INDEPENDENCE for 1000+ feet within 7 day(s). (4.)Mr. Farias will participate in 25+ minutes of therapeutic activity/exercise while maintaining stable vital signs to improve functional mobility and activity tolerance within 7 day(s). (5.)Mr. Farias will negotiate 20 stairs using unilateral handrail with SUPERVISION within 7 day(s). ________________________________________________________________________________________________      PHYSICAL THERAPY: Daily Note, Treatment Day: 1st and AM 8/2/2017  INPATIENT: Hospital Day: 3  Payor: Shira Mayfield / Plan: [facility] / Product Type: PPO /      Sternal precautions     NAME/AGE/GENDER: Judy Coronado is a 59 y.o. male           PRIMARY DIAGNOSIS: Atherosclerosis of native coronary artery of native heart with unstable angina pectoris ([facility] [I25.110] Coronary artery disease involving native coronary artery of native heart with unstable angina pectoris ([address] Coronary artery disease involving native coronary artery of native heart with unstable angina pectoris (HCC)  Procedure(s) (LRB):  CORONARY ARTERY BYPASS GRAFT X 2, LIMA   (N/A)  VEIN HARVEST GREATER SAPHENOUS (Left)  ESOPHAGEAL TRANS ECHOCARDIOGRAM (N/A)  2 Days Post-Op  ICD-10: Treatment Diagnosis:       · Generalized Muscle Weakness (M62.81)  · Other lack of cordination (R27.8)  · Difficulty in walking, Not elsewhere classified (R26.2)   Precaution/Allergies:  Review of patient's allergies indicates no known allergies. ASSESSMENT:      Mr. Constance Eaton presents sitting up in bedside chair with spouse present.  He is agreeable to treatment. He increased his ambulation distance today. He was instructed in seated exercises. He demonstrated good technique with all exercises. Great progress noted this morning with mobility. Will continue PT efforts. This section established at most recent assessment   PROBLEM LIST (Impairments causing functional limitations):  1. Decreased Strength  2. Decreased ADL/Functional Activities  3. Decreased Transfer Abilities  4. Decreased Ambulation Ability/Technique  5. Decreased Balance  6. Increased Pain  7. Decreased Activity Tolerance  8. Increased Shortness of Breath    INTERVENTIONS PLANNED: (Benefits and precautions of physical therapy have been discussed with the patient.)  1. Balance Exercise  2. Bed Mobility  3. Family Education  4. Gait Training  5. Therapeutic Activites  6. Therapeutic Exercise/Strengthening  7. Transfer Training      TREATMENT PLAN: Frequency/Duration: twice daily for duration of hospital stay  Rehabilitation Potential For Stated Goals: GOOD      RECOMMENDED REHABILITATION/EQUIPMENT: (at time of discharge pending progress): Continue Skilled Therapy and Home Health: Physical Therapy. HISTORY:   History of Present Injury/Illness (Reason for Referral):  Mr. Norris Franco is a 59year old female admitted with CAD and is 1 day s/p CABG x2. Great saphenous vein harvested from LLE. Past Medical History/Comorbidities:   Mr. Norris Franco  has a past medical history of CAD (coronary artery disease); Hypercholesterolemia; Hypertension; Leukopenia; and Rheumatic fever. He also has no past medical history of Adverse effect of anesthesia; Difficult intubation; Malignant hyperthermia due to anesthesia; Nausea & vomiting; or Pseudocholinesterase deficiency. Mr. Norris Franco  has a past surgical history that includes orthopaedic; heart catheterization; tonsillectomy; colonoscopy; and endoscopy.   Social History/Living Environment:   Home Environment: Private residence  # Steps to Enter: 0  One/Two Story Residence: Two story  # of Interior Steps: 21  Interior Rails: Right  Lift Chair Available: No  Living Alone: No  Support Systems: Spouse/Significant Other/Partner, Family member(s), Child(peter)  Patient Expects to be Discharged to[de-identified] Private residence  Current DME Used/Available at Home: None  Tub or Shower Type:  (Tub/shower and walk in shower)  Prior Level of Function/Work/Activity:  Patient lives with wife and son in a two story residence with 0 steps to enter and 20 steps up to bedroom/bathroom. At baseline, patient is independent with ADL's and IADL's, works, drives, and ambulates independently. Reports no falls. Number of Personal Factors/Comorbidities that affect the Plan of Care: 3+: HIGH COMPLEXITY   EXAMINATION:   Most Recent Physical Functioning:   Gross Assessment:                  Posture:  Posture (WDL): Within defined limits  Balance:  Sitting: Intact  Standing: Intact  Standing - Static: Good  Standing - Dynamic : Good Bed Mobility:     Wheelchair Mobility:     Transfers:  Sit to Stand: Supervision  Stand to Sit: Supervision  Gait:     Base of Support: Widened  Speed/Luann:  (normalizing)  Gait Abnormalities: Decreased step clearance  Distance (ft): 250 Feet (ft)  Ambulation - Level of Assistance: Supervision       Body Structures Involved:  1. Heart Body Functions Affected:  1. Cardio  2. Hematological Activities and Participation Affected:  1. General Tasks and Demands  2. Mobility  3. Self Care   Number of elements that affect the Plan of Care: 4+: HIGH COMPLEXITY   CLINICAL PRESENTATION:   Presentation: Evolving clinical presentation with changing clinical characteristics: MODERATE COMPLEXITY   CLINICAL DECISION MAKIN [facility] Mobility Inpatient Short Form  How much difficulty does the patient currently have. .. Unable A Lot A Little None   1. Turning over in bed (including adjusting bedclothes, sheets and blankets)?    [ ] 1 [ ] 2   [ ] 3   [X] 4   2. Sitting down on and standing up from a chair with arms ( e.g., wheelchair, bedside commode, etc.)   [ ] 1   [ ] 2   [ ] 3   [X] 4   3. Moving from lying on back to sitting on the side of the bed? [ ] 1   [ ] 2   [X] 3   [ ] 4   How much help from another person does the patient currently need. .. Total A Lot A Little None   4. Moving to and from a bed to a chair (including a wheelchair)? [ ] 1   [ ] 2   [ ] 3   [X] 4   5. Need to walk in hospital room? [ ] 1   [ ] 2   [ ] 3   [X] 4   6. Climbing 3-5 steps with a railing? [ ] 1   [ ] 2   [X] 3   [ ] 4   © 2007, Trustees of [facility], under license to [employer]. All rights reserved    Score:  Initial: 22 Most Recent: X (Date: -- )     Interpretation of Tool:  Represents activities that are increasingly more difficult (i.e. Bed mobility, Transfers, Gait). Score 24 23 22-20 19-15 14-10 9-7 6       Modifier CH CI CJ CK CL CM CN         · Mobility - Walking and Moving Around:               - CURRENT STATUS:    CJ - 20%-39% impaired, limited or restricted               - GOAL STATUS:           CI - 1%-19% impaired, limited or restricted               - D/C STATUS:                       ---------------To be determined---------------  Payor: BLUE CROSS / Plan: [place] BLUE CROSS [facility] / Product Type: PPO /       Medical Necessity:     · Patient demonstrates good rehab potential due to higher previous functional level. Reason for Services/Other Comments:  · Patient continues to require skilled intervention due to decreased functional mobility, balance/gait status, and activity tolerance. Use of outcome tool(s) and clinical judgement create a POC that gives a: Clear prediction of patient's progress: LOW COMPLEXITY                 TREATMENT:   (In addition to Assessment/Re-Assessment sessions the following treatments were rendered)   Pre-treatment Symptoms/Complaints:  \"I feel good. \"  Pain: Initial: Patient had just been given pain medication prior to PT arrival.     Post Session: None noted      Therapeutic Exercise: (25 Minutes):  Exercises per grid below to improve mobility, strength and activity tolerance. Required minimal verbal cues to promote proper body breathing techniques. Progressed repetitions and complexity of movement as indicated. Date:  8-2-17 Date:   Date:     ACTIVITY/EXERCISE AM PM AM PM AM PM   Ambulation:           Distance  Device  Duration 250 feet No assistive device with Supervision        Seated Heel Raises x20        Seated Toe Raises x20        Seated Long Arc Quads x20        Seated Marching x20        Seated Hip Abduction x20                 B = bilateral; AA = active assistive; A = active; P = passive           Braces/Orthotics/Lines/Etc:   · O2 Device: Room air  Treatment/Session Assessment:    · Response to Treatment:  See above. · Interdisciplinary Collaboration:  · Physical Therapy Assistant and Registered Nurse  · After treatment position/precautions:  · Up in chair, Bed/Chair-wheels locked, Call light within reach, RN notified and Family at bedside  · Compliance with Program/Exercises: Will assess as treatment progresses. · Recommendations/Intent for next treatment session: \"Next visit will focus on advancements to more challenging activities\".   Total Treatment Duration:PT Patient Time In/Time Out  Time In: 1015  Time Out: Dariel 108, PTA

## 2017-08-02 NOTE — PROGRESS NOTES
Problem: Patient Education: Go to Patient Education Activity  Goal: Patient/Family Education  LTG:  (1.)Mr. Farias will move from supine to sit and sit to supine, scoot up and down and roll side to side with INDEPENDENCE within 7 day(s). (2.)Mr. Farias will transfer from bed to chair and chair to bed with MODIFIED INDEPENDENCE within 7 day(s). (3.)Mr. Farias will ambulate with MODIFIED INDEPENDENCE for 1000+ feet within 7 day(s). (4.)Mr. Farias will participate in 25+ minutes of therapeutic activity/exercise while maintaining stable vital signs to improve functional mobility and activity tolerance within 7 day(s). (5.)Mr. Farias will negotiate 20 stairs using unilateral handrail with SUPERVISION within 7 day(s). ________________________________________________________________________________________________      PHYSICAL THERAPY: Daily Note, Treatment Day: 1st and PM 8/2/2017  INPATIENT: Hospital Day: 3  Payor: BLUE CROSS / Plan: [place] BLUE CROSS [facility] / Product Type: PPO /      Sternal precautions     NAME/AGE/GENDER: Nereyda Jack is a 59 y.o. male           PRIMARY DIAGNOSIS: Atherosclerosis of native coronary artery of native heart with unstable angina pectoris ([facility] [address] [I25.110] Coronary artery disease involving native coronary artery of native heart with unstable angina pectoris ([address] Coronary artery disease involving native coronary artery of native heart with unstable angina pectoris (HCC)  Procedure(s) (LRB):  CORONARY ARTERY BYPASS GRAFT X 2, LIMA   (N/A)  VEIN HARVEST GREATER SAPHENOUS (Left)  ESOPHAGEAL TRANS ECHOCARDIOGRAM (N/A)  2 Days Post-Op  ICD-10: Treatment Diagnosis:       · Generalized Muscle Weakness (M62.81)  · Other lack of cordination (R27.8)  · Difficulty in walking, Not elsewhere classified (R26.2)   Precaution/Allergies:  Review of patient's allergies indicates no known allergies. ASSESSMENT:      Mr. Nimesh Thorne presents sitting up in bedside chair with spouse present.  He is agreeable to treatment. He increased his ambulation distance today. He was instructed in seated exercises. He demonstrated good technique with all exercises. Great progress noted this morning with mobility. Will continue PT efforts. PM NOTE: Patient presents sitting up in bedside chair agreeable to treatment. He maintained ambulation distance this afternoon. He ascended/descended 10 steps with Supervision and verbal cues. Good progress with mobility noted. Will see for additional time for home exercise program.      This section established at most recent assessment   PROBLEM LIST (Impairments causing functional limitations):  1. Decreased Strength  2. Decreased ADL/Functional Activities  3. Decreased Transfer Abilities  4. Decreased Ambulation Ability/Technique  5. Decreased Balance  6. Increased Pain  7. Decreased Activity Tolerance  8. Increased Shortness of Breath    INTERVENTIONS PLANNED: (Benefits and precautions of physical therapy have been discussed with the patient.)  1. Balance Exercise  2. Bed Mobility  3. Family Education  4. Gait Training  5. Therapeutic Activites  6. Therapeutic Exercise/Strengthening  7. Transfer Training      TREATMENT PLAN: Frequency/Duration: twice daily for duration of hospital stay  Rehabilitation Potential For Stated Goals: GOOD      RECOMMENDED REHABILITATION/EQUIPMENT: (at time of discharge pending progress): Continue Skilled Therapy and Home Health: Physical Therapy. HISTORY:   History of Present Injury/Illness (Reason for Referral):  Mr. Clarinda Duverney is a 59year old female admitted with CAD and is 1 day s/p CABG x2. Great saphenous vein harvested from LLE. Past Medical History/Comorbidities:   Mr. Clarinda Duverney  has a past medical history of CAD (coronary artery disease); Hypercholesterolemia; Hypertension; Leukopenia; Rheumatic fever; and Stenosis of left carotid artery without cerebral infarction (8/2/2017).  He also has no past medical history of Adverse effect of anesthesia; Difficult intubation; Malignant hyperthermia due to anesthesia; Nausea & vomiting; or Pseudocholinesterase deficiency. Mr. Lynn Mendoza  has a past surgical history that includes orthopaedic; heart catheterization; tonsillectomy; colonoscopy; and endoscopy. Social History/Living Environment:   Home Environment: Private residence  # Steps to Enter: 0  One/Two Story Residence: Two story  # of Interior Steps: 21  Interior Rails: Right  Lift Chair Available: No  Living Alone: No  Support Systems: Spouse/Significant Other/Partner, Family member(s), Child(peter)  Patient Expects to be Discharged to[de-identified] Private residence  Current DME Used/Available at Home: None  Tub or Shower Type:  (Tub/shower and walk in shower)  Prior Level of Function/Work/Activity:  Patient lives with wife and son in a two story residence with 0 steps to enter and 20 steps up to bedroom/bathroom. At baseline, patient is independent with ADL's and IADL's, works, drives, and ambulates independently. Reports no falls. Number of Personal Factors/Comorbidities that affect the Plan of Care: 3+: HIGH COMPLEXITY   EXAMINATION:   Most Recent Physical Functioning:   Gross Assessment:                  Posture:  Posture (WDL): Within defined limits  Balance:  Sitting: Intact  Standing: Intact  Standing - Static: Good  Standing - Dynamic : Good Bed Mobility:     Wheelchair Mobility:     Transfers:  Sit to Stand: Supervision  Stand to Sit: Supervision  Gait:     Base of Support: Widened  Speed/Luann:  (normalizing)  Gait Abnormalities: Decreased step clearance  Distance (ft): 250 Feet (ft)  Ambulation - Level of Assistance: Supervision  Number of Stairs Trained: 10  Stairs - Level of Assistance: Supervision  Rail Use: Left        Body Structures Involved:  1. Heart Body Functions Affected:  1. Cardio  2. Hematological Activities and Participation Affected:  1. General Tasks and Demands  2. Mobility  3.  Self Care   Number of elements that affect the Plan of Care: 4+: HIGH COMPLEXITY   CLINICAL PRESENTATION:   Presentation: Evolving clinical presentation with changing clinical characteristics: MODERATE COMPLEXITY   CLINICAL DECISION MAKIN [facility] Mobility Inpatient Short Form  How much difficulty does the patient currently have. .. Unable A Lot A Little None   1. Turning over in bed (including adjusting bedclothes, sheets and blankets)? [ ] 1   [ ] 2   [ ] 3   [X] 4   2. Sitting down on and standing up from a chair with arms ( e.g., wheelchair, bedside commode, etc.)   [ ] 1   [ ] 2   [ ] 3   [X] 4   3. Moving from lying on back to sitting on the side of the bed? [ ] 1   [ ] 2   [X] 3   [ ] 4   How much help from another person does the patient currently need. .. Total A Lot A Little None   4. Moving to and from a bed to a chair (including a wheelchair)? [ ] 1   [ ] 2   [ ] 3   [X] 4   5. Need to walk in hospital room? [ ] 1   [ ] 2   [ ] 3   [X] 4   6. Climbing 3-5 steps with a railing? [ ] 1   [ ] 2   [X] 3   [ ] 4   © , Trustees of [address] [place] [address], under license to [employer]. All rights reserved    Score:  Initial: 22 Most Recent: X (Date: -- )     Interpretation of Tool:  Represents activities that are increasingly more difficult (i.e. Bed mobility, Transfers, Gait). Score 24 23 22-20 19-15 14-10 9-7 6       Modifier CH CI CJ CK CL CM CN         · Mobility - Walking and Moving Around:               - CURRENT STATUS:    CJ - 20%-39% impaired, limited or restricted               - GOAL STATUS:           CI - 1%-19% impaired, limited or restricted               - D/C STATUS:                       ---------------To be determined---------------  Payor: BLUE CROSS / Plan: [facility] / Product Type: PPO /       Medical Necessity:     · Patient demonstrates good rehab potential due to higher previous functional level.   Reason for Services/Other Comments:  · Patient continues to require skilled intervention due to decreased functional mobility, balance/gait status, and activity tolerance. Use of outcome tool(s) and clinical judgement create a POC that gives a: Clear prediction of patient's progress: LOW COMPLEXITY                 TREATMENT:   (In addition to Assessment/Re-Assessment sessions the following treatments were rendered)   Pre-treatment Symptoms/Complaints:  \"I'm okay. \"  Pain: Initial:   Pain Intensity 1: 0  Post Session: None noted      Therapeutic Exercise: (10 Minutes):  Exercises per grid below to improve mobility, strength and activity tolerance. Required minimal verbal cues to promote proper body breathing techniques. Progressed repetitions and complexity of movement as indicated. Date:  8-2-17 Date:   Date:     ACTIVITY/EXERCISE AM PM AM PM AM PM   Ambulation:           Distance  Device  Duration 250 feet No assistive device with Supervision 250 feet no assistive device        Seated Heel Raises x20        Seated Toe Raises x20        Seated Long Arc Quads x20        Seated Marching x20        Seated Hip Abduction x20        Ascend/descend steps  x10 with left hand rail       B = bilateral; AA = active assistive; A = active; P = passive           Braces/Orthotics/Lines/Etc:   · O2 Device: Room air  Treatment/Session Assessment:    · Response to Treatment:  See above. · Interdisciplinary Collaboration:  · Physical Therapy Assistant and Registered Nurse  · After treatment position/precautions:  · Up in chair, Bed/Chair-wheels locked, Call light within reach, RN notified and Family at bedside  · Compliance with Program/Exercises: Will assess as treatment progresses. · Recommendations/Intent for next treatment session: \"Next visit will focus on advancements to more challenging activities\".   Total Treatment Duration:PT Patient Time In/Time Out  Time In: 4618  Time Out: [facility]

## 2017-08-02 NOTE — PROGRESS NOTES
Pt seen sitting up in bedside chair. Alert and oriented s/p CABG with Dr. Nilson Daily. Pt states he lives with wife and 10 y/o son that he adopted. Does not use anything to ambulate with and drives self. States he still works outside of home. Confirms [facility] and PCP. Discussed d/c POC for home with [facility]. States wife is nurse and will be at home with him. [facility] could not accept, so services set up with [facility] for nursing and PT. CM will continue to follow for d/c needs. Care Management Interventions  PCP Verified by CM: Yes  Mode of Transport at Discharge: Other (see comment)  Transition of Care Consult (CM Consult): [address]: No  Reason Outside [place]: Unable to staff case  Physical Therapy Consult: Yes  Current Support Network: Lives with Spouse, Own Home  Confirm Follow Up Transport: Family  Plan discussed with Pt/Family/Caregiver: Yes  Freedom of Choice Offered:  Yes

## 2017-08-02 NOTE — PROGRESS NOTES
Daily Progress Note: 8/2/2017  Admission Date: 7/31/2017    SURGICAL  Procedure(s):  CORONARY ARTERY BYPASS GRAFT X 2, LIMA    VEIN HARVEST GREATER SAPHENOUS  ESOPHAGEAL TRANS ECHOCARDIOGRAM --- 2 Days Post-Op :  7/31/2017    Chart Reviewed. Subjective:     Doing well with strong family support.   Off oxygen sat @ 92%  Pain controlled   No BM    Objective:     Current Facility-Administered Medications   Medication Dose Route Frequency    guaiFENesin ER (MUCINEX) tablet 600 mg  600 mg Oral Q12H    albuterol-ipratropium (DUO-NEB) 2.5 MG-0.5 MG/3 ML  3 mL Nebulization BID    senna-docusate (PERICOLACE) 8.6-50 mg per tablet 2 Tab  2 Tab Oral DAILY    sodium chloride (NS) flush 5-10 mL  5-10 mL IntraVENous Q8H    sodium chloride (NS) flush 5-10 mL  5-10 mL IntraVENous PRN    alum-mag hydroxide-simeth (MYLANTA) oral suspension 30 mL  30 mL Oral Q4H PRN    acetaminophen (TYLENOL) tablet 650 mg  650 mg Oral Q4H PRN    zolpidem (AMBIEN) tablet 5 mg  5 mg Oral QHS PRN    atorvastatin (LIPITOR) tablet 80 mg  80 mg Oral QHS    magnesium oxide (MAG-OX) tablet 400 mg  400 mg Oral TID PRN    magnesium oxide (MAG-OX) tablet 400 mg  400 mg Oral QID PRN    potassium chloride (K-DUR, KLOR-CON) SR tablet 20 mEq  20 mEq Oral BID PRN    potassium chloride (K-DUR, KLOR-CON) SR tablet 40 mEq  40 mEq Oral BID PRN    famotidine (PEPCID) tablet 20 mg  20 mg Oral Q12H    mupirocin (BACTROBAN) 2 % ointment   Both Nostrils Q12H    READ PPD  1 Each Other Q24H    traMADol (ULTRAM) tablet 50 mg  50 mg Oral Q6H PRN    oxyCODONE-acetaminophen (PERCOCET) 5-325 mg per tablet 1 Tab  1 Tab Oral Q4H PRN    metoprolol tartrate (LOPRESSOR) tablet 25 mg  25 mg Oral Q8H    oxyCODONE-acetaminophen (PERCOCET 10)  mg per tablet 1 Tab  1 Tab Oral Q4H PRN    amiodarone (CORDARONE) tablet 200 mg  200 mg Oral Q12H    aspirin chewable tablet 81 mg  81 mg Oral DAILY     Vitals:    08/02/17 4342 08/02/17 0602 08/02/17 327 08/02/17 0709   BP: 117/67 134/69 112/65    Pulse: 92 89 79    Resp: 16  16    Temp: 98.8 °F (37.1 °C)  98.5 °F (36.9 °C)    SpO2: 94%  97% 94%   Weight: 166 lb (75.3 kg)      Height:           Intake and Output:   Last 3 Shifts: 07/31 1901 - 08/02 0700  In: 2165.8 [P.O.:1560; I.V.:605.8]  Out: 0706 [Urine:2007]  Current Shift: 08/02 0701 - 08/02 1900  In: 480 [P.O.:480]  Out: -     Physical Exam:            GEN: well developed and in no acute distress,   HEENT:  PERRL, EOMI, no alar flaring or epistaxis, oral mucosa moist without cyanosis,   NECK:  no JVD, no retractions, no thyromegaly or masses,   LUNGS:  Clear, diminished at bases  CHEST: incision dry/healing with stable ecchymosis. TPW removed  HEART:  RRR with no M,G,R; ST elevation/endocarditis  ABDOMEN:  soft with no tenderness, positive bowel sounds present  EXTREMITIES:  warm with no cyanosis, no edema  SKIN:  no jaundice or ecchymosis   NEURO:  alert and oriented, grossly non-focal    CHEST XRAY: none today    LAB:  Recent Labs      08/02/17 0350 08/01/17 0215 07/31/17 2200 07/31/17   1415   WBC  5.9  5.0   --    --   4.7   HGB  10.5*  11.1*  11.3*   < >  11.8*   HCT  31.1*  32.3*  33.4*   < >  35.5*   PLT  111*  112*   --    --   103*    < > = values in this interval not displayed. Recent Labs      08/02/17 0350 08/01/17 0215 07/31/17 2200 07/31/17   1415   NA   --   144   --    --   144   K  4.0  4.1  4.2   < >  3.8   CL   --   112*   --    --   109*   GLU   --   133*   --    --   87   CO2   --   22   --    --   25   BUN   --   14   --    --   18   CREA   --   0.72*   --    --   0.74*   MG  2.2  2.4  2.5*   < >  3.4*   INR   --    --    --    --   1.2    < > = values in this interval not displayed.      Recent Labs      07/31/17   1415   PH  7.37   PCO2  42   PO2  270*   HCO3  24       Assessment:     Principal Problem:    Coronary artery disease involving native coronary artery of native heart with unstable angina pectoris ([facility] [address] (7/31/2017)      Overview: 7/31/17 (Dr Sterling Ivy) CORONARY ARTERY BYPASS GRAFT X 2, LIMA to the LAD, SVG       to the (L) PDA        VEIN HARVEST GREATER SAPHENOUS      ESOPHAGEAL TRANS ECHOCARDIOGRAM    Active Problems:    Hypertension ()      Hypercholesterolemia ()      Hypoxia (7/31/2017)      S/P CABG (coronary artery bypass graft) (7/31/2017)        Plan:     POD 2 CABG x 2 - TPW removed  Labs stable, recheck plts in AM  Vascular Surgery Consults R ICA stenosis  ST elevation on tele endocarditis  Bowel regimen  Hope for DC in [address], NP

## 2017-08-02 NOTE — PROGRESS NOTES
Bedside and Verbal shift change report given to Dariusz Kumar RN (oncoming nurse) by EVANGELIST Reynoso (offgoing nurse).

## 2017-08-02 NOTE — CONSULTS
[address], [address] [facility]  [phone]     History and Physical / Surgical Consultation   Amanda Pacheco    Admit date: 2017    MRN: [id number]     : 1955     Age: 59 y.o.          2017 3:15 PM    Subjective/HPI:   This patient is a 59 y.o. seen and evaluated at the request of Guru Breaux MD and John Crane NP. Mr. Jacqualyn Baumgarten is POD #2 two-vessel CABG (LIMA to LAD, SVG to L PDA) by Dr. Chary Beltre and was found to have right internal carotid stenosis of 70-99% on preoperative carotid duplex (left ICA <50%); we were consulted. The patient carries the diagnoses of HTN, hyperlipidemia and CAD. During history today, the patient states his heart disease was mostly asymptomatic, with only fatigue noticed by wife and daughter, both of whom are nurses. He denies previous history of TIA or stroke. He denies slurred speech, expressive dysphasia, facial drooping, visual changes / amaurosis or unilateral paralysis / paresis. He has a distant history of cigarette use, 1ppd x 10y but quit ~20y ago. He has a physical job and stays active at home as well. He has family history of ischemic heart disease but no family history of stroke. Patient's past medical, surgical, family and social histories were reviewed as noted here and below. Review of Systems  A comprehensive review of systems was negative except for that written in the HPI.     Past Medical History:   Diagnosis Date    CAD (coronary artery disease)     scheduled to have CABG 17    Hypercholesterolemia     Hypertension     Leukopenia     pt reports \"normal for him, never been a problem\"     Rheumatic fever     Stenosis of left carotid artery without cerebral infarction 2017      Past Surgical History:   Procedure Laterality Date    HX COLONOSCOPY      HX ENDOSCOPY      HX HEART CATHETERIZATION      HX ORTHOPAEDIC      rt shoulder/rt hand    HX TONSILLECTOMY        No Known Allergies   Social History   Substance Use Topics    Smoking status: Former Smoker     Packs/day: 1.00     Years: 20.00     Quit date: 7/10/1991    Smokeless tobacco: Never Used      Comment: x 20 yrs ago    Alcohol use 12.6 oz/week     21 Glasses of wine per week      Social History     Social History Narrative    . Lives with wife, who is a former RN. His daughter is an ICU RN at [address] [place] [address]     Family History   Problem Relation Age of Onset    Hypertension Mother     Heart Disease Father       Prior to Admission Medications   Prescriptions Last Dose Informant Patient Reported? Taking? AMIODARONE HCL (AMIODARONE PO) 7/30/2017 at 1600  Yes Yes   Sig: Take 600 mg by mouth once. Per dr Willis Ulrich's orders take after 1600 day prior to surgery. METOPROLOL TARTRATE (LOPRESSOR PO) 7/30/2017 at 1600  Yes Yes   Sig: Take 12.5 mg by mouth once. Per dr Willis Ulrich's orders take after 1600 day prior to surgery   OMEGA-3 FATTY ACIDS/FISH OIL (FISH OIL EXTRA STRENGTH PO) 7/29/2017  Yes No   Sig: Take 2,400 mg by mouth daily. Stop seven days prior to surgery per anesthesia protocol. aspirin delayed-release 81 mg tablet 7/31/2017 at 0600  Yes Yes   Sig: Take 81 mg by mouth every morning. Take day of surgery per anesthesia protocol. b complex vitamins (B COMPLEX 1) tablet 7/29/2017  Yes No   Sig: Take 1 Tab by mouth daily. Stop seven days prior to surgery per anesthesia protocol. benazepril (LOTENSIN) 40 mg tablet 7/29/2017  Yes No   Sig: Take 40 mg by mouth every morning. Indications: hypertension   calcium carbonate (CALTREX) 600 mg calcium (1,500 mg) tablet 7/29/2017  Yes No   Sig: Take 600 mg by mouth daily. Stop seven days prior to surgery per anesthesia protocol. multivitamin (ONE A DAY) tablet 7/29/2017  Yes No   Sig: Take 1 Tab by mouth daily. Stop seven days prior to surgery per anesthesia protocol.    mupirocin calcium (BACTROBAN) 2 % nasal ointment 7/31/2017 at 0600  Yes Yes   Sig: by Both Nostrils route two (2) times a day. Per Dr Omi oliveros   simvastatin (ZOCOR) 80 mg tablet 7/30/2017 at Unknown time  Yes Yes   Sig: Take 80 mg by mouth every morning. Take day of surgery per anesthesia protocol.   Indications: hypercholesterolemia      Facility-Administered Medications: None     Current Facility-Administered Medications   Medication Dose Route Frequency    guaiFENesin ER (MUCINEX) tablet 600 mg  600 mg Oral Q12H    albuterol-ipratropium (DUO-NEB) 2.5 MG-0.5 MG/3 ML  3 mL Nebulization BID    senna-docusate (PERICOLACE) 8.6-50 mg per tablet 2 Tab  2 Tab Oral DAILY    sodium chloride 0.9 % bolus infusion 100 mL  100 mL IntraVENous RAD ONCE    saline peripheral flush soln 10 mL  10 mL InterCATHeter RAD ONCE    iopamidol (ISOVUE-370) 76 % injection 80 mL  80 mL IntraVENous RAD ONCE    sodium chloride (NS) flush 5-10 mL  5-10 mL IntraVENous Q8H    sodium chloride (NS) flush 5-10 mL  5-10 mL IntraVENous PRN    alum-mag hydroxide-simeth (MYLANTA) oral suspension 30 mL  30 mL Oral Q4H PRN    acetaminophen (TYLENOL) tablet 650 mg  650 mg Oral Q4H PRN    zolpidem (AMBIEN) tablet 5 mg  5 mg Oral QHS PRN    atorvastatin (LIPITOR) tablet 80 mg  80 mg Oral QHS    magnesium oxide (MAG-OX) tablet 400 mg  400 mg Oral TID PRN    magnesium oxide (MAG-OX) tablet 400 mg  400 mg Oral QID PRN    potassium chloride (K-DUR, KLOR-CON) SR tablet 20 mEq  20 mEq Oral BID PRN    potassium chloride (K-DUR, KLOR-CON) SR tablet 40 mEq  40 mEq Oral BID PRN    famotidine (PEPCID) tablet 20 mg  20 mg Oral Q12H    mupirocin (BACTROBAN) 2 % ointment   Both Nostrils Q12H    READ PPD  1 Each Other Q24H    traMADol (ULTRAM) tablet 50 mg  50 mg Oral Q6H PRN    oxyCODONE-acetaminophen (PERCOCET) 5-325 mg per tablet 1 Tab  1 Tab Oral Q4H PRN    metoprolol tartrate (LOPRESSOR) tablet 25 mg  25 mg Oral Q8H    oxyCODONE-acetaminophen (PERCOCET 10)  mg per tablet 1 Tab  1 Tab Oral Q4H PRN    amiodarone (CORDARONE) tablet 200 mg  200 mg Oral Q12H    aspirin chewable tablet 81 mg  81 mg Oral DAILY     Objective:     Vitals:    08/02/17 0656 08/02/17 0709 08/02/17 1115 08/02/17 1125   BP: 112/65  135/80    Pulse: 79  83    Resp: 16  20    Temp: 98.5 °F (36.9 °C)  97.7 °F (36.5 °C)    SpO2: 97% 94% 94% 96%   Weight:       Height:         08/02 0701 - 08/02 1900  In: 480 [P.O.:480]  Out: -   07/31 1901 - 08/02 0700  In: 2165.8 [P.O.:1560; I.V.:605.8]  Out: 4820 [Urine:2007]    Physical Exam:   General well-developed, well-nourished and in no acute distress, appears younger than stated age  Skin  warm and moist with good texture and good turgor; no jaundice or rashes  HEENT normocephalic without lesions to the scalp or skull; extraocular muscles intact without nystagmus; mouth with adequate dentition, oral mucosa moist without lesions  Neck  supple without adenopathy, JVD or bruits; resolving ecchymosis at right neck; trachea midline, no thyromegaly  Chest  fresh median sternotomy  Lungs  respirations unlabored, lungs clear to auscultation bilaterally  Heart  regular rate and rhythm, no appreciable murmurs  Abdomen soft, non-distended, non-tender; bowel sounds normoactive  Extremities warm without cyanosis; strength 5/5 and symmetric; fresh right saphenectomy incision; knee-high compression hose on, minimal bilateral pedal edema  Pulses  2+ and symmetric at radial, brachial, DP and PT  Neurological alert and oriented; no gross sensorimotor deficits, CN II-XII intact     Data Review   Recent Labs      08/02/17   0350  08/01/17   0215  07/31/17   2200   07/31/17   1415   WBC  5.9  5.0   --    --   4.7   HGB  10.5*  11.1*  11.3*   < >  11.8*   HCT  31.1*  32.3*  33.4*   < >  35.5*   PLT  111*  112*   --    --   103*    < > = values in this interval not displayed.      Recent Labs      08/02/17   0350  08/01/17   0215  07/31/17   2200   07/31/17   1415   NA   --   144   --    --   144   K  4.0  4.1  4.2   < >  3.8   CL   -- 112*   --    --   109*   CO2   --   22   --    --   25   GLU   --   133*   --    --   87   BUN   --   14   --    --   18   CREA   --   0.72*   --    --   0.74*   MG  2.2  2.4  2.5*   < >  3.4*   INR   --    --    --    --   1.2    < > = values in this interval not displayed. Imaging  TITLE:  Carotid and Vertebral Ultrasound Examination, 7/28/2017  INDICATION:  Preoperative coronary artery bypass graft surgery planning. Coronary artery disease.     COMPARISON: None.     RIGHT NECK:  The peak systolic velocity in the Common Carotid Artery = 72  cm/sec; Internal Carotid Artery = 263 cm/sec. Ratio = 3.7.       [place], echogenic, shadowing plaque in the distal CCA/carotid bulb. Waveform  broadening in the proximal CINTHYA. Anterograde flow in the vertebral artery.     LEFT  NECK:  The peak systolic velocity in the Common Carotid Artery = 63  cm/sec; Internal Carotid Artery = 90 cm/sec. Ratio = 1.4.       Echogenic and shadowing plaque in the carotid bulb. No waveform broadening. Anterograde flow in the vertebral artery.     IMPRESSION:    CINTHYA:  70-99% stenosis based on elevated peak systolic velocity. 3.7 ICA/CCA  ratio suggests a slightly lower degree of stenosis. Calcified atherosclerotic  plaque.     LICA:  Less than 45% stenosis. Calcified atherosclerotic plaque.       Assessment / Plan:     Hospital Problems  Date Reviewed: 7/31/2017          Codes Class Noted POA    Stenosis of left carotid artery without cerebral infarction (Chronic) ICD-10-CM: B96.21  ICD-9-CM: 433.10  8/2/2017 Yes        * (Principal)Coronary artery disease involving native coronary artery of native heart with unstable angina pectoris ([facility] [address] ICD-10-CM: I25.110  ICD-9-CM: 414.01, 411.1  7/31/2017 Yes    Overview Signed 8/1/2017  9:32 AM by Fiordaliza Thompson NP     7/31/17 (Dr Hermann Becker) CORONARY ARTERY BYPASS GRAFT X 2, LIMA to the LAD, SVG to the (L) PDA    VEIN HARVEST GREATER SAPHENOUS  ESOPHAGEAL TRANS ECHOCARDIOGRAM Hypoxia ICD-10-CM: R09.02  ICD-9-CM: 799.02  7/31/2017 No        S/P CABG (coronary artery bypass graft) ICD-10-CM: Z95.1  ICD-9-CM: V45.81  7/31/2017 No        Hypertension (Chronic) ICD-10-CM: I10  ICD-9-CM: 401.9  Unknown Yes        Hypercholesterolemia (Chronic) ICD-10-CM: E78.00  ICD-9-CM: 272.0  Unknown Yes            Mr. Coco Potter is a 57yo male s/p 2-vessel CABG with right carotid disease discovered on pre-op carotid duplex. Plan:  - CTA head and neck with / without contrast  - continue ASA  - further plan per Dr. Shanique Norton    Thank you very much for this referral.  We appreciate the opportunity to participate in this patient's care. Will follow along with above stated plan. WANDA JarrettC  Physician Assistant with Vascular Surgery Jackie Garcia MD / Jane Peguero.  Claribel Luna MD / Trini Merlos MD

## 2017-08-03 VITALS
RESPIRATION RATE: 19 BRPM | SYSTOLIC BLOOD PRESSURE: 123 MMHG | TEMPERATURE: 98.4 F | DIASTOLIC BLOOD PRESSURE: 63 MMHG | OXYGEN SATURATION: 92 % | WEIGHT: 162.2 LBS | HEART RATE: 84 BPM | HEIGHT: 69 IN | BODY MASS INDEX: 24.02 KG/M2

## 2017-08-03 PROBLEM — R09.02 HYPOXIA: Status: RESOLVED | Noted: 2017-07-31 | Resolved: 2017-08-03

## 2017-08-03 LAB
ABO + RH BLD: NORMAL
BLD PROD TYP BPU: NORMAL
BLD PROD TYP BPU: NORMAL
BLOOD GROUP ANTIBODIES SERPL: NORMAL
BPU ID: NORMAL
BPU ID: NORMAL
CROSSMATCH RESULT,%XM: NORMAL
CROSSMATCH RESULT,%XM: NORMAL
MAGNESIUM SERPL-MCNC: 2.2 MG/DL (ref 1.8–2.4)
PLATELET # BLD AUTO: 108 K/UL (ref 150–450)
POTASSIUM SERPL-SCNC: 3.8 MMOL/L (ref 3.5–5.1)
SPECIMEN EXP DATE BLD: NORMAL
STATUS OF UNIT,%ST: NORMAL
STATUS OF UNIT,%ST: NORMAL
UNIT DIVISION, %UDIV: 0
UNIT DIVISION, %UDIV: 0

## 2017-08-03 PROCEDURE — 74011250637 HC RX REV CODE- 250/637: Performed by: NURSE PRACTITIONER

## 2017-08-03 PROCEDURE — 84132 ASSAY OF SERUM POTASSIUM: CPT | Performed by: THORACIC SURGERY (CARDIOTHORACIC VASCULAR SURGERY)

## 2017-08-03 PROCEDURE — 74011250637 HC RX REV CODE- 250/637: Performed by: THORACIC SURGERY (CARDIOTHORACIC VASCULAR SURGERY)

## 2017-08-03 PROCEDURE — 36415 COLL VENOUS BLD VENIPUNCTURE: CPT | Performed by: THORACIC SURGERY (CARDIOTHORACIC VASCULAR SURGERY)

## 2017-08-03 PROCEDURE — 83735 ASSAY OF MAGNESIUM: CPT | Performed by: THORACIC SURGERY (CARDIOTHORACIC VASCULAR SURGERY)

## 2017-08-03 PROCEDURE — 85049 AUTOMATED PLATELET COUNT: CPT | Performed by: NURSE PRACTITIONER

## 2017-08-03 RX ORDER — OXYCODONE AND ACETAMINOPHEN 5; 325 MG/1; MG/1
1 TABLET ORAL
Qty: 30 TAB | Refills: 0 | Status: SHIPPED
Start: 2017-08-03 | End: 2017-09-20

## 2017-08-03 RX ORDER — TRAMADOL HYDROCHLORIDE 50 MG/1
50 TABLET ORAL
Qty: 30 TAB | Refills: 0 | Status: SHIPPED | OUTPATIENT
Start: 2017-08-03 | End: 2017-10-27

## 2017-08-03 RX ORDER — ACETAMINOPHEN 325 MG/1
TABLET ORAL
Qty: 1 TAB | Refills: 0 | Status: SHIPPED
Start: 2017-08-03 | End: 2017-09-20

## 2017-08-03 RX ORDER — METOPROLOL TARTRATE 25 MG/1
25 TABLET, FILM COATED ORAL EVERY 8 HOURS
Qty: 90 TAB | Refills: 2 | Status: SHIPPED | OUTPATIENT
Start: 2017-08-03 | End: 2017-08-03

## 2017-08-03 RX ORDER — AMOXICILLIN 250 MG
CAPSULE ORAL
Qty: 1 TAB | Refills: 0 | Status: SHIPPED
Start: 2017-08-03 | End: 2017-09-05

## 2017-08-03 RX ORDER — BENAZEPRIL HYDROCHLORIDE 20 MG/1
10 TABLET ORAL DAILY
Qty: 30 TAB | Refills: 2 | Status: SHIPPED | OUTPATIENT
Start: 2017-08-03 | End: 2017-08-03

## 2017-08-03 RX ORDER — BENAZEPRIL HYDROCHLORIDE 20 MG/1
20 TABLET ORAL DAILY
Qty: 30 TAB | Refills: 2 | Status: SHIPPED | OUTPATIENT
Start: 2017-08-03 | End: 2017-08-03

## 2017-08-03 RX ORDER — GUAIFENESIN 600 MG/1
TABLET, EXTENDED RELEASE ORAL
Qty: 1 TAB | Refills: 0 | Status: SHIPPED
Start: 2017-08-03 | End: 2017-08-10

## 2017-08-03 RX ORDER — METOPROLOL SUCCINATE 100 MG/1
100 TABLET, EXTENDED RELEASE ORAL DAILY
Qty: 30 TAB | Refills: 0 | Status: SHIPPED | OUTPATIENT
Start: 2017-08-03 | End: 2017-08-23 | Stop reason: SDUPTHER

## 2017-08-03 RX ORDER — METOPROLOL SUCCINATE 50 MG/1
50 TABLET, EXTENDED RELEASE ORAL DAILY
Qty: 30 TAB | Refills: 5 | Status: SHIPPED | OUTPATIENT
Start: 2017-08-03 | End: 2017-08-03

## 2017-08-03 RX ADMIN — ASPIRIN 81 MG 81 MG: 81 TABLET ORAL at 08:33

## 2017-08-03 RX ADMIN — METOPROLOL TARTRATE 25 MG: 25 TABLET ORAL at 06:01

## 2017-08-03 RX ADMIN — GUAIFENESIN 600 MG: 600 TABLET, EXTENDED RELEASE ORAL at 08:32

## 2017-08-03 RX ADMIN — MUPIROCIN: 20 OINTMENT TOPICAL at 08:35

## 2017-08-03 RX ADMIN — Medication 10 ML: at 06:03

## 2017-08-03 RX ADMIN — FAMOTIDINE 20 MG: 20 TABLET ORAL at 08:33

## 2017-08-03 RX ADMIN — AMIODARONE HYDROCHLORIDE 200 MG: 200 TABLET ORAL at 08:33

## 2017-08-03 RX ADMIN — OXYCODONE HYDROCHLORIDE AND ACETAMINOPHEN 1 TABLET: 10; 325 TABLET ORAL at 10:06

## 2017-08-03 NOTE — PROGRESS NOTES
Cardiac rehab: chart reviewed, appropriate for outpatient cardiac rehab. Patient qualifies for cardiac rehab with: CABG (7/31/17). Discussion of benefits of outpatient cardiac rehab provided and time allowed for questions. Discussed exercise, diet, and lifestyle changes to improve heart health. Pt is very interested in participating at [address] [facility] re[facility]. Pt will be contacted following discharge to schedule orientation. Cardiologist is Dr. Humphrey Skelton.

## 2017-08-03 NOTE — PROGRESS NOTES
Pt d/c home today with [facility]. Called to [facility] [phone] to notify of d/c and d/c summary faxed to [phone]. Spoke with Damaso Mondragon at [facility] and faxed d/c summary.

## 2017-08-03 NOTE — PROGRESS NOTES
57-year-old male status post coronary bypass with a severe right ICA stenosis. I have seen and examined the patient and reviewed the appropriate imaging studies. Please see full consult note dated 8/2/2017    I met with the family this morning and reviewed the CTA that was done yesterday with them in person. He indeed has a greater than 75% stenosis with dense atheroma at the level of the bifurcation on the right. I recommended a right carotid endarterectomy for stroke prophylaxis. We will plan to see him back in the office about 2 weeks after his discharge and schedule him for his procedure at that time which should give him plenty of opportunity to recover from his coronary procedure. I have asked him to continue on his aspirin and statin medication in the interim. Approximately 15 minutes  was spent in direct patient contact during this encounter including 50% of which was spend in patient education, counseling, review of medical history and imaging, and medical decision making.

## 2017-08-03 NOTE — OP NOTES
[address]   OPERATIVE REPORT       Name:  Brody Zarate   MR#:  [id number]   :  1955   Account #:  [de-identified]   Date of Adm:  2017       DATE OF SURGERY: 2017. PREOPERATIVE DIAGNOSIS: Coronary artery occlusive disease. POSTOPERATIVE DIAGNOSIS: Coronary artery occlusive disease. PROCEDURE PERFORMED: Two-vessel coronary artery bypass grafting   using reverse saphenous vein graft to the left posterior   descending coronary and left internal mammary artery to the left   anterior descending coronary; (arterial line and Chauncey-Chica   catheter placed by Anesthesia); temporary right ventricular   pacing wires. CARDIOPULMONARY BYPASS TIME: 83 minutes. AORTIC CROSSCLAMP TIME 69 minutes. SURGEON:  Seb Ordoñez. Ashvin Hsieh MD    ASSISTANT: Juan Jose Burnett. Nelly Decker MD.    PREOPERATIVE HISTORY: This is a 78-year-old gentleman who had   developed fatigue and some shortness of breath with activities. His cardiac catheterization showed high-grade stenosis of the   proximal left anterior descending coronary. There was also a   proximal stenosis of the left posterior descending coronary in   this left dominant system. Because of the segmental area of   stenosis of the proximal LAD, surgical revascularization was   recommended. WHAT WAS FOUND AND WHAT WAS DONE: The heart was exposed through   a median sternotomy. The heart was normal in size, contracted   well with no transmural scarring. Two coronaries were grafted,   placing reverse vein graft to the left posterior descending   coronary, and the internal mammary artery was used to bypass the   mid left anterior descending coronary. The patient came off   bypass without trouble. PROCEDURE IN DETAIL: The patient was premedicated and brought to   the operating room. The patient was placed supine on the table,   and appropriate monitoring lines were placed including a Chauncey-  Chica catheter and radial artery catheter.  General endotracheal   anesthesia was given. The anterior body and both legs were then   prepped with Betadine. The patient was draped into a sterile   field. Saphenous vein was taken from the lower leg. The vein was   prepared, and the skin of the leg was closed with subcuticular   closure technique. The chest was opened in the midline. A   sternotomy was carried out. The left pleural cavity was opened   widely. The mammary artery was taken down. Next, the pericardium   was opened vertically and retracted. Heparin at 3 mg/kg body   weight was given. The patient was cannulated, placed on bypass   and cooled to 32 degrees centigrade. Aorta was crossclamped, and   blood cardioplegia was given antegrade. The posterior descending   coronary off the left system was identified, opened for a 5 mm   length, and reverse vein was sutured to this vessel with a   running 7-0 Prolene. Next, the mid left anterior descending   coronary was identified, opened for a 5 mm distance, and the   internal mammary artery was sutured directly to this vessel with   7-0 Prolene. After this, further cardioplegia was given. A 4 mm   button of aortic wall was removed, and the proximal end of the   vein graft was sutured directly to the aorta with 6-0 Prolene. The patient was then weaned from bypass without difficulty. All   blood was then returned to the patient after which cannulas were   removed and the pursestring sutures tied. Protamine was then   given to reverse the heparin. Temporary right ventricular pacing   wire was placed. A 32-Green[place]ic tube was left to drain the   mediastinum. Hemostasis was satisfactory. The sternum was then   approximated with interrupted stainless steel wire. Soft tissues   were closed with Vicryl, and the skin was closed with Vicryl   using a subcuticular closure technique. The patient tolerated   the procedure well, was moved to intensive care in a stable   condition.  Sponge, needle, and instrument counts were correct.         MD LESIA James / SK   D:  08/03/2017   16:34   T:  08/03/2017   17:09   Job #:  [id number]

## 2017-08-03 NOTE — PROGRESS NOTES
Patient 88% on room air. Patient placed on [facility]. O2 saturation increased to 95%.   Will continue to monitor

## 2017-08-03 NOTE — PROGRESS NOTES
Discharge instructions reviewed with patient and his wife. Medications, incision care and activity restrictions discussed. Time allowed for questions. Patient escorted to discharge area via wheelchair where his wife drove him home.

## 2017-08-03 NOTE — PROGRESS NOTES
Problem: Mobility Impaired (Adult and Pediatric)  Goal: *Acute Goals and Plan of Care (Insert Text)     Physical Therapy Note:     Gave patient a home exercise program consisting of both standing and seated exercises. He verbalized understanding of all exercises. Patient is to be discharged from facility this morning. Thank you.       Terra Magallanes, PTA

## 2017-08-10 PROBLEM — Z95.1 HX OF CABG: Status: ACTIVE | Noted: 2017-08-10

## 2017-09-05 ENCOUNTER — HOSPITAL ENCOUNTER (OUTPATIENT)
Dept: CARDIAC REHAB | Age: 62
Discharge: HOME | End: 2017-09-05

## 2017-09-05 NOTE — CARDIO/PULMONARY
Dear Dr. Elizabeth Jeong : Thank you for referring your patient, Hoa Sorensen (1955), to the Cardiac Rehabilitation Program at [address] [facility]. Mr. Roberth Thomas is a good candidate for the Rehab Program and should see improvements with regular participation. We will be addressing appropriate interventions for modifiable risk factors with your patient during the next 12 weeks. We will contact you with any issues or concerns that may arise, or you can follow your patients progress through [facility] at any time. A final summary will be sent to you when the program is completed. Again, thank you for your referral. If we can be of further assistance, please feel free to contact the Cardiopulmonary Rehab staff at [phone].

## 2017-09-13 ENCOUNTER — HOSPITAL ENCOUNTER (OUTPATIENT)
Dept: CARDIAC REHAB | Age: 62
Discharge: HOME | End: 2017-09-13
Payer: COMMERCIAL

## 2017-09-13 VITALS — HEIGHT: 69 IN | WEIGHT: 158.6 LBS | BODY MASS INDEX: 23.49 KG/M2

## 2017-09-13 PROCEDURE — 93798 PHYS/QHP OP CAR RHAB W/ECG: CPT

## 2017-09-15 ENCOUNTER — HOSPITAL ENCOUNTER (OUTPATIENT)
Dept: CARDIAC REHAB | Age: 62
Discharge: HOME | End: 2017-09-15
Payer: COMMERCIAL

## 2017-09-15 PROCEDURE — 93798 PHYS/QHP OP CAR RHAB W/ECG: CPT

## 2017-09-18 ENCOUNTER — HOSPITAL ENCOUNTER (OUTPATIENT)
Dept: CARDIAC REHAB | Age: 62
Discharge: HOME | End: 2017-09-18
Payer: COMMERCIAL

## 2017-09-18 PROCEDURE — 93798 PHYS/QHP OP CAR RHAB W/ECG: CPT

## 2017-09-20 ENCOUNTER — HOSPITAL ENCOUNTER (OUTPATIENT)
Dept: CARDIAC REHAB | Age: 62
Discharge: HOME | End: 2017-09-20
Payer: COMMERCIAL

## 2017-09-20 ENCOUNTER — HOSPITAL ENCOUNTER (OUTPATIENT)
Dept: LAB | Age: 62
Discharge: HOME | End: 2017-09-20
Attending: INTERNAL MEDICINE
Payer: COMMERCIAL

## 2017-09-20 VITALS — WEIGHT: 158.6 LBS | BODY MASS INDEX: 23.42 KG/M2

## 2017-09-20 DIAGNOSIS — R60.0 LOCALIZED EDEMA: ICD-10-CM

## 2017-09-20 PROBLEM — I65.21 STENOSIS OF RIGHT CAROTID ARTERY WITHOUT CEREBRAL INFARCTION: Status: RESOLVED | Noted: 2017-08-02 | Resolved: 2017-09-20

## 2017-09-20 PROBLEM — I65.23 BILATERAL CAROTID ARTERY STENOSIS: Status: RESOLVED | Noted: 2017-09-20 | Resolved: 2017-09-20

## 2017-09-20 PROBLEM — I77.9 BILATERAL CAROTID ARTERY DISEASE (HCC): Status: ACTIVE | Noted: 2017-09-20

## 2017-09-20 PROBLEM — I65.23 BILATERAL CAROTID ARTERY STENOSIS: Status: ACTIVE | Noted: 2017-09-20

## 2017-09-20 LAB
ALBUMIN SERPL-MCNC: 3.7 G/DL (ref 3.2–4.6)
ALBUMIN/GLOB SERPL: 0.9 {RATIO}
ALP SERPL-CCNC: 104 U/L (ref 50–136)
ALT SERPL-CCNC: 32 U/L (ref 12–65)
ANION GAP SERPL CALC-SCNC: 5 MMOL/L
AST SERPL-CCNC: 30 U/L (ref 15–37)
BASOPHILS # BLD: 0 K/UL (ref 0–0.2)
BASOPHILS NFR BLD: 1 % (ref 0–2)
BILIRUB SERPL-MCNC: 0.3 MG/DL (ref 0.2–1.1)
BNP SERPL-MCNC: 51 PG/ML
BUN SERPL-MCNC: 18 MG/DL (ref 8–23)
CALCIUM SERPL-MCNC: 8.9 MG/DL (ref 8.3–10.4)
CHLORIDE SERPL-SCNC: 103 MMOL/L (ref 98–107)
CO2 SERPL-SCNC: 31 MMOL/L (ref 21–32)
CREAT SERPL-MCNC: 1 MG/DL (ref 0.8–1.5)
DIFFERENTIAL METHOD BLD: ABNORMAL
EOSINOPHIL # BLD: 0.2 K/UL (ref 0–0.8)
EOSINOPHIL NFR BLD: 4 % (ref 0.5–7.8)
ERYTHROCYTE [DISTWIDTH] IN BLOOD BY AUTOMATED COUNT: 12.4 % (ref 11.9–14.6)
GLOBULIN SER CALC-MCNC: 4.3 G/DL
GLUCOSE SERPL-MCNC: 95 MG/DL (ref 65–100)
HCT VFR BLD AUTO: 41.5 % (ref 41.1–50.3)
HGB BLD-MCNC: 13.5 G/DL (ref 13.6–17.2)
LYMPHOCYTES # BLD: 1.6 K/UL (ref 0.5–4.6)
LYMPHOCYTES NFR BLD: 33 % (ref 13–44)
MAGNESIUM SERPL-MCNC: 2.4 MG/DL (ref 1.8–2.4)
MCH RBC QN AUTO: 30.3 PG (ref 26.1–32.9)
MCHC RBC AUTO-ENTMCNC: 32.5 G/DL (ref 31.4–35)
MCV RBC AUTO: 93.3 FL (ref 79.6–97.8)
MONOCYTES # BLD: 0.5 K/UL (ref 0.1–1.3)
MONOCYTES NFR BLD: 11 % (ref 4–12)
NEUTS SEG # BLD: 2.4 K/UL (ref 1.7–8.2)
NEUTS SEG NFR BLD: 51 % (ref 43–78)
PLATELET # BLD AUTO: 197 K/UL (ref 150–450)
PMV BLD AUTO: 8.1 FL (ref 10.8–14.1)
POTASSIUM SERPL-SCNC: 4.1 MMOL/L (ref 3.5–5.1)
PROT SERPL-MCNC: 8 G/DL (ref 6.3–8.2)
RBC # BLD AUTO: 4.45 M/UL (ref 4.23–5.67)
SODIUM SERPL-SCNC: 139 MMOL/L (ref 136–145)
TSH SERPL DL<=0.005 MIU/L-ACNC: 4.62 UIU/ML (ref 0.36–3.74)
WBC # BLD AUTO: 4.7 K/UL (ref 4.3–11.1)

## 2017-09-20 PROCEDURE — 93798 PHYS/QHP OP CAR RHAB W/ECG: CPT

## 2017-09-21 ENCOUNTER — HOSPITAL ENCOUNTER (OUTPATIENT)
Dept: ULTRASOUND IMAGING | Age: 62
Discharge: HOME | End: 2017-09-21
Attending: INTERNAL MEDICINE
Payer: COMMERCIAL

## 2017-09-21 DIAGNOSIS — R60.0 LOCALIZED EDEMA: ICD-10-CM

## 2017-09-21 PROCEDURE — 93971 EXTREMITY STUDY: CPT

## 2017-09-21 NOTE — PROGRESS NOTES
Please call patient, no dvt on US. Good news, needs to elevate leg, consider LE compression stocking to minimize swelling.   Thanks

## 2017-09-21 NOTE — PROGRESS NOTES
I called and informed pt.of DR. Ruiz's response to labs. He sees DR. Myrna Carson in [facility] is the phone number. I called DR. Schroeder's office office informed Piper Do I needed to fax over labs for DR. Myrna Carson. She asked that I fax to [phone] this was done. Pt.notified to fu w/PCP and voiced understanding.

## 2017-09-21 NOTE — PROGRESS NOTES
Please call patient, labs look very good. TSH a little off, need to alert PCP. Likely of no concern. Hb back to normal.  We will wait on LE doppler results to r/o DVT. Call PRN for issues with BP on new med. But, labs are good.   Thanks

## 2017-09-21 NOTE — PROGRESS NOTES
I called and informed pt.wife of DR. Ruiz's response to Duplex left lower extremity and she voiced understanding and will inform pt. Pilar Bliss

## 2017-09-22 ENCOUNTER — HOSPITAL ENCOUNTER (OUTPATIENT)
Dept: CARDIAC REHAB | Age: 62
Discharge: HOME | End: 2017-09-22
Payer: COMMERCIAL

## 2017-09-22 PROCEDURE — 93798 PHYS/QHP OP CAR RHAB W/ECG: CPT

## 2017-09-22 NOTE — PROGRESS NOTES
Client History:  Current Medical Diagnosis: see ITP  Pertinent Medications: see review PTA meds      Have you gained or lost any weight in the past 3 months: no     What do you attribute it to: What is your weight goal: see cardiac ITP    Have you made any changes in your eating habits since your heart problems began: no was eating healthy    Describe your appetite: good    Supplements/Vitamins/Herbs:Fish oil, B complex, MVI, calcium    Food allergies: no    Problems with digestion, N/V/C/D: no    Alcohol use: 15 oz wine/day    Dietary recall:  Breakfast is nuts and 5 tangerines  Pt eats raw fruits and vegetables, cheese, lean meat but no bread, no potatoes, no junk food  Drinks 5 cups water/day and 3 cups half caffienated coffee per day            Anthropometric Data: see cardiac ITP  Ht:   Wt:   Age:  BMI:  Waist measurement:     Estimated Nutritional Needs:  Calories: 25 kcal/kg ZWK=1340 kcals  Protein: 1gm/kg IBW=73gm    CHO:n/a   Fluids:1ml/kcal      Nutrition Diagnosis: Good knowledge base related to therapeutic diet as evidenced by diet high in fiber and low in sodium. Nutrition Intervention:  Reviewed lab/body comp results/goals. Reviewed rate your plate and heart healthy choices. Pt/wife interested in sodium and wanted to know about red meats which we discussed. Reviewed [employer] recommendations for alcohol. Discussed DASH diet approaches for HTN. Discussed label reading and recipe modification tips. Discussed healthier cheeses per pt/wife request. Affirmed pt's fiber intake. Handouts: lab/body comp, heart healthy, grocery guide, herbs and spices, recipes, recipe modification tips. Goals: See cardiac ITP         Monitoring/Evaluation: Follow-up appointment: RD to follow up with participant during cardiac rehab sessions for questions and assessment of progression toward goals.     Fay Joaquin, RD, LD, CDE  Phone: [phone]

## 2017-09-25 ENCOUNTER — HOSPITAL ENCOUNTER (OUTPATIENT)
Dept: CARDIAC REHAB | Age: 62
Discharge: HOME | End: 2017-09-25
Payer: COMMERCIAL

## 2017-09-25 PROCEDURE — 93798 PHYS/QHP OP CAR RHAB W/ECG: CPT

## 2017-09-26 PROBLEM — L03.116 CELLULITIS OF LEG, LEFT: Status: ACTIVE | Noted: 2017-09-26

## 2017-09-26 PROBLEM — R60.0 LEG EDEMA, LEFT: Chronic | Status: ACTIVE | Noted: 2017-09-26

## 2017-09-27 ENCOUNTER — HOSPITAL ENCOUNTER (OUTPATIENT)
Dept: CARDIAC REHAB | Age: 62
Discharge: HOME | End: 2017-09-27
Payer: COMMERCIAL

## 2017-09-27 PROCEDURE — 93798 PHYS/QHP OP CAR RHAB W/ECG: CPT

## 2017-09-29 ENCOUNTER — HOSPITAL ENCOUNTER (OUTPATIENT)
Dept: CARDIAC REHAB | Age: 62
Discharge: HOME | End: 2017-09-29
Payer: COMMERCIAL

## 2017-09-29 PROCEDURE — 93798 PHYS/QHP OP CAR RHAB W/ECG: CPT

## 2017-10-02 ENCOUNTER — APPOINTMENT (OUTPATIENT)
Dept: CARDIAC REHAB | Age: 62
End: 2017-10-02
Payer: COMMERCIAL

## 2017-10-04 ENCOUNTER — HOSPITAL ENCOUNTER (OUTPATIENT)
Dept: CARDIAC REHAB | Age: 62
Discharge: HOME | End: 2017-10-04
Payer: COMMERCIAL

## 2017-10-04 VITALS — BODY MASS INDEX: 23.42 KG/M2 | WEIGHT: 158.6 LBS

## 2017-10-04 PROCEDURE — 93798 PHYS/QHP OP CAR RHAB W/ECG: CPT

## 2017-10-06 ENCOUNTER — HOSPITAL ENCOUNTER (OUTPATIENT)
Dept: CARDIAC REHAB | Age: 62
Discharge: HOME | End: 2017-10-06
Payer: COMMERCIAL

## 2017-10-06 PROCEDURE — 93798 PHYS/QHP OP CAR RHAB W/ECG: CPT

## 2017-10-09 ENCOUNTER — HOSPITAL ENCOUNTER (OUTPATIENT)
Dept: CARDIAC REHAB | Age: 62
Discharge: HOME | End: 2017-10-09
Payer: COMMERCIAL

## 2017-10-09 PROCEDURE — 93798 PHYS/QHP OP CAR RHAB W/ECG: CPT

## 2017-10-11 ENCOUNTER — HOSPITAL ENCOUNTER (OUTPATIENT)
Dept: CARDIAC REHAB | Age: 62
Discharge: HOME | End: 2017-10-11
Payer: COMMERCIAL

## 2017-10-11 VITALS — BODY MASS INDEX: 23.42 KG/M2 | WEIGHT: 158.6 LBS

## 2017-10-11 PROCEDURE — 93798 PHYS/QHP OP CAR RHAB W/ECG: CPT

## 2017-10-13 ENCOUNTER — HOSPITAL ENCOUNTER (OUTPATIENT)
Dept: CARDIAC REHAB | Age: 62
Discharge: HOME | End: 2017-10-13
Payer: COMMERCIAL

## 2017-10-13 PROCEDURE — 93798 PHYS/QHP OP CAR RHAB W/ECG: CPT

## 2017-10-16 ENCOUNTER — HOSPITAL ENCOUNTER (OUTPATIENT)
Dept: CARDIAC REHAB | Age: 62
Discharge: HOME | End: 2017-10-16
Payer: COMMERCIAL

## 2017-10-16 PROCEDURE — 93798 PHYS/QHP OP CAR RHAB W/ECG: CPT

## 2017-10-18 ENCOUNTER — HOSPITAL ENCOUNTER (OUTPATIENT)
Dept: CARDIAC REHAB | Age: 62
Discharge: HOME | End: 2017-10-18
Payer: COMMERCIAL

## 2017-10-18 VITALS — WEIGHT: 161.2 LBS | BODY MASS INDEX: 23.81 KG/M2

## 2017-10-18 PROCEDURE — 93798 PHYS/QHP OP CAR RHAB W/ECG: CPT

## 2017-10-20 ENCOUNTER — HOSPITAL ENCOUNTER (OUTPATIENT)
Dept: CARDIAC REHAB | Age: 62
End: 2017-10-20
Payer: COMMERCIAL

## 2017-10-23 ENCOUNTER — HOSPITAL ENCOUNTER (OUTPATIENT)
Dept: CARDIAC REHAB | Age: 62
Discharge: HOME | End: 2017-10-23
Payer: COMMERCIAL

## 2017-10-23 PROCEDURE — 93798 PHYS/QHP OP CAR RHAB W/ECG: CPT

## 2017-10-25 ENCOUNTER — APPOINTMENT (OUTPATIENT)
Dept: CARDIAC REHAB | Age: 62
End: 2017-10-25
Payer: COMMERCIAL

## 2017-10-27 ENCOUNTER — HOSPITAL ENCOUNTER (OUTPATIENT)
Dept: CARDIAC REHAB | Age: 62
Discharge: HOME | End: 2017-10-27
Payer: COMMERCIAL

## 2017-10-27 PROBLEM — R60.0 LOCALIZED EDEMA: Status: RESOLVED | Noted: 2017-09-20 | Resolved: 2017-10-27

## 2017-10-27 PROCEDURE — 93798 PHYS/QHP OP CAR RHAB W/ECG: CPT

## 2017-10-30 ENCOUNTER — HOSPITAL ENCOUNTER (OUTPATIENT)
Dept: CARDIAC REHAB | Age: 62
Discharge: HOME | End: 2017-10-30
Payer: COMMERCIAL

## 2017-11-01 ENCOUNTER — APPOINTMENT (OUTPATIENT)
Dept: CARDIAC REHAB | Age: 62
End: 2017-11-01

## 2017-11-03 ENCOUNTER — APPOINTMENT (OUTPATIENT)
Dept: CARDIAC REHAB | Age: 62
End: 2017-11-03

## 2017-11-06 ENCOUNTER — APPOINTMENT (OUTPATIENT)
Dept: CARDIAC REHAB | Age: 62
End: 2017-11-06

## 2017-11-08 ENCOUNTER — APPOINTMENT (OUTPATIENT)
Dept: CARDIAC REHAB | Age: 62
End: 2017-11-08

## 2017-11-10 ENCOUNTER — APPOINTMENT (OUTPATIENT)
Dept: CARDIAC REHAB | Age: 62
End: 2017-11-10

## 2017-11-13 ENCOUNTER — APPOINTMENT (OUTPATIENT)
Dept: CARDIAC REHAB | Age: 62
End: 2017-11-13

## 2017-11-15 ENCOUNTER — APPOINTMENT (OUTPATIENT)
Dept: CARDIAC REHAB | Age: 62
End: 2017-11-15

## 2017-11-17 ENCOUNTER — APPOINTMENT (OUTPATIENT)
Dept: CARDIAC REHAB | Age: 62
End: 2017-11-17

## 2017-11-20 ENCOUNTER — APPOINTMENT (OUTPATIENT)
Dept: CARDIAC REHAB | Age: 62
End: 2017-11-20

## 2017-11-22 ENCOUNTER — APPOINTMENT (OUTPATIENT)
Dept: CARDIAC REHAB | Age: 62
End: 2017-11-22

## 2017-11-24 ENCOUNTER — APPOINTMENT (OUTPATIENT)
Dept: CARDIAC REHAB | Age: 62
End: 2017-11-24

## 2017-11-27 ENCOUNTER — APPOINTMENT (OUTPATIENT)
Dept: CARDIAC REHAB | Age: 62
End: 2017-11-27

## 2017-11-29 ENCOUNTER — APPOINTMENT (OUTPATIENT)
Dept: CARDIAC REHAB | Age: 62
End: 2017-11-29

## 2017-12-01 ENCOUNTER — APPOINTMENT (OUTPATIENT)
Dept: CARDIAC REHAB | Age: 62
End: 2017-12-01

## 2017-12-04 ENCOUNTER — APPOINTMENT (OUTPATIENT)
Dept: CARDIAC REHAB | Age: 62
End: 2017-12-04

## 2017-12-06 ENCOUNTER — APPOINTMENT (OUTPATIENT)
Dept: CARDIAC REHAB | Age: 62
End: 2017-12-06

## 2018-04-30 PROBLEM — I35.1 NONRHEUMATIC AORTIC VALVE INSUFFICIENCY: Status: ACTIVE | Noted: 2018-04-30

## 2018-07-30 ENCOUNTER — HOSPITAL ENCOUNTER (OUTPATIENT)
Dept: LAB | Age: 63
Discharge: HOME | End: 2018-07-30
Attending: INTERNAL MEDICINE
Payer: COMMERCIAL

## 2018-07-30 DIAGNOSIS — I25.111 CORONARY ARTERY DISEASE INVOLVING NATIVE CORONARY ARTERY OF NATIVE HEART WITH ANGINA PECTORIS WITH DOCUMENTED SPASM (HCC): ICD-10-CM

## 2018-07-30 LAB
ANION GAP SERPL CALC-SCNC: 5 MMOL/L
BASOPHILS # BLD: 0 K/UL (ref 0–0.2)
BASOPHILS NFR BLD: 1 % (ref 0–2)
BUN SERPL-MCNC: 22 MG/DL (ref 8–23)
CALCIUM SERPL-MCNC: 9.3 MG/DL (ref 8.3–10.4)
CHLORIDE SERPL-SCNC: 103 MMOL/L (ref 98–107)
CO2 SERPL-SCNC: 31 MMOL/L (ref 21–32)
CREAT SERPL-MCNC: 1 MG/DL (ref 0.8–1.5)
DIFFERENTIAL METHOD BLD: ABNORMAL
EOSINOPHIL # BLD: 0.1 K/UL (ref 0–0.8)
EOSINOPHIL NFR BLD: 2 % (ref 0.5–7.8)
ERYTHROCYTE [DISTWIDTH] IN BLOOD BY AUTOMATED COUNT: 12 % (ref 11.9–14.6)
GLUCOSE SERPL-MCNC: 96 MG/DL (ref 65–100)
HCT VFR BLD AUTO: 38.6 % (ref 41.1–50.3)
HGB BLD-MCNC: 13.1 G/DL (ref 13.6–17.2)
INR PPP: 1
LYMPHOCYTES # BLD: 1.4 K/UL (ref 0.5–4.6)
LYMPHOCYTES NFR BLD: 34 % (ref 13–44)
MCH RBC QN AUTO: 32.7 PG (ref 26.1–32.9)
MCHC RBC AUTO-ENTMCNC: 33.9 G/DL (ref 31.4–35)
MCV RBC AUTO: 96.3 FL (ref 79.6–97.8)
MONOCYTES # BLD: 0.5 K/UL (ref 0.1–1.3)
MONOCYTES NFR BLD: 13 % (ref 4–12)
NEUTS SEG # BLD: 2 K/UL (ref 1.7–8.2)
NEUTS SEG NFR BLD: 50 % (ref 43–78)
PLATELET # BLD AUTO: 170 K/UL (ref 150–450)
PMV BLD AUTO: 7.8 FL (ref 10.8–14.1)
POTASSIUM SERPL-SCNC: 4.4 MMOL/L (ref 3.5–5.1)
PROTHROMBIN TIME: 13.2 SEC (ref 11.5–14.5)
RBC # BLD AUTO: 4.01 M/UL (ref 4.23–5.67)
SODIUM SERPL-SCNC: 139 MMOL/L (ref 136–145)
WBC # BLD AUTO: 3.9 K/UL (ref 4.3–11.1)

## 2018-07-30 PROCEDURE — 85610 PROTHROMBIN TIME: CPT | Performed by: INTERNAL MEDICINE

## 2018-07-30 PROCEDURE — 80048 BASIC METABOLIC PNL TOTAL CA: CPT | Performed by: INTERNAL MEDICINE

## 2018-07-30 PROCEDURE — 85025 COMPLETE CBC W/AUTO DIFF WBC: CPT | Performed by: INTERNAL MEDICINE

## 2018-07-30 PROCEDURE — 36415 COLL VENOUS BLD VENIPUNCTURE: CPT | Performed by: INTERNAL MEDICINE

## 2018-07-31 ENCOUNTER — HOSPITAL ENCOUNTER (OUTPATIENT)
Dept: CARDIAC CATH/INVASIVE PROCEDURES | Age: 63
Discharge: HOME | End: 2018-07-31
Attending: INTERNAL MEDICINE | Admitting: INTERNAL MEDICINE
Payer: COMMERCIAL

## 2018-07-31 VITALS
OXYGEN SATURATION: 97 % | RESPIRATION RATE: 19 BRPM | BODY MASS INDEX: 23.49 KG/M2 | SYSTOLIC BLOOD PRESSURE: 152 MMHG | DIASTOLIC BLOOD PRESSURE: 77 MMHG | HEIGHT: 68 IN | WEIGHT: 155 LBS | HEART RATE: 57 BPM

## 2018-07-31 LAB
ATRIAL RATE: 60 BPM
CALCULATED P AXIS, ECG09: 63 DEGREES
CALCULATED R AXIS, ECG10: 50 DEGREES
CALCULATED T AXIS, ECG11: 52 DEGREES
DIAGNOSIS, 93000: NORMAL
P-R INTERVAL, ECG05: 172 MS
Q-T INTERVAL, ECG07: 424 MS
QRS DURATION, ECG06: 90 MS
QTC CALCULATION (BEZET), ECG08: 424 MS
VENTRICULAR RATE, ECG03: 60 BPM

## 2018-07-31 PROCEDURE — 93005 ELECTROCARDIOGRAM TRACING: CPT | Performed by: INTERNAL MEDICINE

## 2018-07-31 PROCEDURE — 74011636320 HC RX REV CODE- 636/320: Performed by: INTERNAL MEDICINE

## 2018-07-31 PROCEDURE — 99153 MOD SED SAME PHYS/QHP EA: CPT

## 2018-07-31 PROCEDURE — 77030004559 HC CATH ANGI DX SUPT CARD -B

## 2018-07-31 PROCEDURE — C1894 INTRO/SHEATH, NON-LASER: HCPCS

## 2018-07-31 PROCEDURE — 74011250636 HC RX REV CODE- 250/636: Performed by: INTERNAL MEDICINE

## 2018-07-31 PROCEDURE — 77030004558 HC CATH ANGI DX SUPR TORQ CARD -A

## 2018-07-31 PROCEDURE — 93459 L HRT ART/GRFT ANGIO: CPT

## 2018-07-31 PROCEDURE — 99152 MOD SED SAME PHYS/QHP 5/>YRS: CPT

## 2018-07-31 PROCEDURE — 74011000250 HC RX REV CODE- 250: Performed by: INTERNAL MEDICINE

## 2018-07-31 PROCEDURE — 74011250636 HC RX REV CODE- 250/636

## 2018-07-31 RX ORDER — SODIUM CHLORIDE 0.9 % (FLUSH) 0.9 %
5-10 SYRINGE (ML) INJECTION AS NEEDED
Status: DISCONTINUED | OUTPATIENT
Start: 2018-07-31 | End: 2018-07-31 | Stop reason: HOSPADM

## 2018-07-31 RX ORDER — SODIUM CHLORIDE 9 MG/ML
75 INJECTION, SOLUTION INTRAVENOUS CONTINUOUS
Status: DISCONTINUED | OUTPATIENT
Start: 2018-07-31 | End: 2018-07-31 | Stop reason: HOSPADM

## 2018-07-31 RX ORDER — LIDOCAINE HYDROCHLORIDE 10 MG/ML
1-5 INJECTION INFILTRATION; PERINEURAL ONCE
Status: COMPLETED | OUTPATIENT
Start: 2018-07-31 | End: 2018-07-31

## 2018-07-31 RX ORDER — RANOLAZINE 500 MG/1
500 TABLET, EXTENDED RELEASE ORAL 2 TIMES DAILY
Qty: 60 TAB | Refills: 11 | Status: SHIPPED | OUTPATIENT
Start: 2018-07-31 | End: 2018-10-30

## 2018-07-31 RX ORDER — HEPARIN SODIUM 200 [USP'U]/100ML
3 INJECTION, SOLUTION INTRAVENOUS CONTINUOUS
Status: DISCONTINUED | OUTPATIENT
Start: 2018-07-31 | End: 2018-07-31 | Stop reason: HOSPADM

## 2018-07-31 RX ORDER — FENTANYL CITRATE 50 UG/ML
25-50 INJECTION, SOLUTION INTRAMUSCULAR; INTRAVENOUS
Status: DISCONTINUED | OUTPATIENT
Start: 2018-07-31 | End: 2018-07-31 | Stop reason: HOSPADM

## 2018-07-31 RX ORDER — MIDAZOLAM HYDROCHLORIDE 1 MG/ML
.5-2 INJECTION, SOLUTION INTRAMUSCULAR; INTRAVENOUS
Status: DISCONTINUED | OUTPATIENT
Start: 2018-07-31 | End: 2018-07-31 | Stop reason: HOSPADM

## 2018-07-31 RX ORDER — GUAIFENESIN 100 MG/5ML
81-324 LIQUID (ML) ORAL ONCE
Status: DISCONTINUED | OUTPATIENT
Start: 2018-07-31 | End: 2018-07-31 | Stop reason: HOSPADM

## 2018-07-31 RX ORDER — SODIUM CHLORIDE 0.9 % (FLUSH) 0.9 %
5-10 SYRINGE (ML) INJECTION EVERY 8 HOURS
Status: DISCONTINUED | OUTPATIENT
Start: 2018-07-31 | End: 2018-07-31 | Stop reason: HOSPADM

## 2018-07-31 RX ORDER — DIAZEPAM 5 MG/1
5 TABLET ORAL ONCE
Status: DISCONTINUED | OUTPATIENT
Start: 2018-07-31 | End: 2018-07-31 | Stop reason: HOSPADM

## 2018-07-31 RX ADMIN — IOPAMIDOL 80 ML: 755 INJECTION, SOLUTION INTRAVENOUS at 08:45

## 2018-07-31 RX ADMIN — FENTANYL CITRATE 25 MCG: 50 INJECTION, SOLUTION INTRAMUSCULAR; INTRAVENOUS at 08:22

## 2018-07-31 RX ADMIN — HEPARIN SODIUM 3 ML/HR: 5000 INJECTION, SOLUTION INTRAVENOUS; SUBCUTANEOUS at 08:11

## 2018-07-31 RX ADMIN — SODIUM CHLORIDE 75 ML/HR: 900 INJECTION, SOLUTION INTRAVENOUS at 07:09

## 2018-07-31 RX ADMIN — MIDAZOLAM HYDROCHLORIDE 2 MG: 1 INJECTION, SOLUTION INTRAMUSCULAR; INTRAVENOUS at 08:22

## 2018-07-31 RX ADMIN — LIDOCAINE HYDROCHLORIDE 5 ML: 10 INJECTION, SOLUTION INFILTRATION; PERINEURAL at 08:26

## 2018-07-31 NOTE — PROGRESS NOTES
Discharged to home accompanied by wife. Left unit via Wheelchair. Right groin without bleeding or hematoma.

## 2018-07-31 NOTE — IP AVS SNAPSHOT
[address] 
[phone] Patient: Tereso Hough MRN: [id number] :1955 Discharge Summary 2018 Tereso Hough MRN[de-identified]  [id number] Admission Information Provider Pager Service Admission Date Expected D/C Date Ryan Yip MD  CARDIAC CATH LAB 2018 Actual LOS Patient Class 0 days OUTPATIENT Follow-up Information Follow up With Details Comments Contact Info Fabien Buckner DO On 2018 at 3:00 p.m. [address] [facility] [address] [facility] Cardiology [facility] [address] 
[phone] My Medications TAKE these medications as instructed Instructions Each Dose to Equal  
 Morning Noon Evening Bedtime  
 aspirin delayed-release 81 mg tablet Your last dose was: Your next dose is: Take 81 mg by mouth every morning. Take day of surgery per anesthesia protocol. 81 mg  
    
   
   
   
  
 B COMPLEX 1 tablet Generic drug:  b complex vitamins Your last dose was: Your next dose is: Take 1 Tab by mouth daily. Stop seven days prior to surgery per anesthesia protocol. 1 Tab  
    
   
   
   
  
 calcium carbonate 600 mg calcium (1,500 mg) tablet Commonly known as:  Jesse Henle Your last dose was: Your next dose is: Take 600 mg by mouth daily. Stop seven days prior to surgery per anesthesia protocol. 600 mg FISH OIL EXTRA STRENGTH PO Your last dose was: Your next dose is: Take 2,400 mg by mouth daily. Stop seven days prior to surgery per anesthesia protocol. 2400 mg  
    
   
   
   
  
 irbesartan 75 mg tablet Commonly known as:  AVAPRO Your last dose was: Your next dose is: Take 1 Tab by mouth nightly.   
 75 mg  
    
   
   
   
  
 isosorbide mononitrate ER 30 mg tablet Commonly known as:  IMDUR Your last dose was: Your next dose is: Take 30 mg by mouth. 30 mg  
    
   
   
   
  
 metoprolol succinate 100 mg tablet Commonly known as:  TOPROL-XL Your last dose was: Your next dose is: Take 1 Tab by mouth daily. 100 mg  
    
   
   
   
  
 multivitamin tablet Commonly known as:  ONE A DAY Your last dose was: Your next dose is: Take 1 Tab by mouth daily. Stop seven days prior to surgery per anesthesia protocol. 1 Tab  
    
   
   
   
  
 ranolazine  mg SR tablet Commonly known as:  RANEXA Your last dose was: Your next dose is: Take 1 Tab by mouth two (2) times a day. 500 mg  
    
   
   
   
  
 simvastatin 80 mg tablet Commonly known as:  ZOCOR Your last dose was: Your next dose is: Take 1 Tab by mouth every morning. Indications: hypercholesterolemia 80 mg Where to Get Your Medications These medications were sent to [address] [place] [address] Phone:  [phone]  
  ranolazine  mg SR tablet General Information Please provide this summary of care documentation to your next provider. Allergies No Known Allergies Current Immunizations  Reviewed on 1/5/2014 Name Date Influenza Vaccine 10/28/2013 TB Skin Test (PPD) Intradermal 7/31/2017 Td 12/1/2010 Tdap 12/1/2013 Zoster Vaccine, Live 11/25/2013 Discharge Instructions Discharge Instructions HEART CATHETERIZATION/ANGIOGRAPHY DISCHARGE INSTRUCTIONS 1. Check puncture site frequently for swelling or bleeding.  If there is any bleeding, lie down and apply pressure over the area with a clean towel or washcloth. Notify your doctor for any redness, swelling, drainage, or oozing from the puncture site. Notify your doctor for any fever or chills. 2. If the extremity becomes cold, numb, or painful call Dr. Serina Napier at [phone]. 
3. Activity should be limited for the next 48 hours. Climb stairs as little as possible and avoid any stooping, bending, or strenuous activity for 48 hours. No heavy lifting (anything over 10 pounds) for 3 days. 4. You may resume your usual diet. Drink more fluids than usual. 
5. Have a responsible person drive you home and stay with you for at least 24 hours after your heart catheterization/angiography. 6. You may remove bandage from your Right groin in 24 hours. You may shower in 24 hours. No tub baths, hot tubs, or swimming for 1 week. Do not place any lotions, creams, powders, or ointments over puncture site for 1 week. You may place a clean band-aid over the puncture site each day for 5 days. Change daily. 7. Do not drive for 24 hours. I have read the above instructions and have had the opportunity to ask questions. Patient: ________________________   Date: 7/31/2018 Witness: _______________________   Date: 7/31/2018 Discharge Orders None  
  
` Patient Signature:  ____________________________________________________________ Date:  ____________________________________________________________  
  
 Ollie Zhao Provider Signature:  ____________________________________________________________ Date:  ____________________________________________________________

## 2018-07-31 NOTE — PROCEDURES
Brief Cardiac Procedure Note    Patient: José Manuel Wade MRN: [id number]  SSN: [id number]    YOB: 1955  Age: 57 y.o. Sex: male      Date of Procedure: 7/31/2018     Pre-procedure Diagnosis: Typical Angina    Post-procedure Diagnosis: Coronary Artery Disease    Reason for Procedure: New Onset Angina < or = 2 Months    Procedure: Left Heart Catheterization    Brief Description of Procedure: lhc, lima and svg times one, manual    Performed By: Marvin Hernandez MD     Assistants: none    Anesthesia: Moderate Sedation    Estimated Blood Loss: Less than 10 mL      Specimens: None    Implants: None    Findings: 2/2patent grafts, nl lvef    Complications: None    Recommendations: Continue medical therapy.     Signed By: Marvin Hernandez MD     July 31, 2018

## 2018-07-31 NOTE — DISCHARGE INSTRUCTIONS
HEART CATHETERIZATION/ANGIOGRAPHY DISCHARGE INSTRUCTIONS    1. Check puncture site frequently for swelling or bleeding. If there is any bleeding, lie down and apply pressure over the area with a clean towel or washcloth. Notify your doctor for any redness, swelling, drainage, or oozing from the puncture site. Notify your doctor for any fever or chills. 2. If the extremity becomes cold, numb, or painful call Dr. So Jaffe at [phone].  3. Activity should be limited for the next 48 hours. Climb stairs as little as possible and avoid any stooping, bending, or strenuous activity for 48 hours. No heavy lifting (anything over 10 pounds) for 3 days. 4. You may resume your usual diet. Drink more fluids than usual.  5. Have a responsible person drive you home and stay with you for at least 24 hours after your heart catheterization/angiography. 6. You may remove bandage from your Right groin in 24 hours. You may shower in 24 hours. No tub baths, hot tubs, or swimming for 1 week. Do not place any lotions, creams, powders, or ointments over puncture site for 1 week. You may place a clean band-aid over the puncture site each day for 5 days. Change daily. 7. Do not drive for 24 hours. I have read the above instructions and have had the opportunity to ask questions.       Patient: ________________________   Date: 7/31/2018    Witness: _______________________   Date: 7/31/2018

## 2018-07-31 NOTE — PROGRESS NOTES
Patient received to [address] [place] [address] room # 10  Ambulatory from [facility]. Patient scheduled for [facility] today with Dr Cooper Galan. Procedure reviewed & questions answered, voiced good understanding consent obtained & placed on chart. All medications and medical history reviewed. Will prep patient per orders. Patient & family updated on plan of care. The patient has a fraility score of 3-MANAGING WELL, based on patient with history of CABG but able to perform most ADL's independently, patient A&Ox3, patient able to ambulate to room without difficulty.

## 2018-07-31 NOTE — PROGRESS NOTES
6 Uzbek sheath removed from right femoral artery and pressure held for 20 minutes. No bleeding or hematoma. Site redressed with sterile gauze covered with tegaderm. 5 pound sandbag placed over site. Hemostasis obtained at 0925.

## 2018-07-31 NOTE — PROCEDURES
[address]    Jenni Burns  MR#: [id number]  : 1955  ACCOUNT #: [de-identified]   DATE OF SERVICE: 2018    CLINICAL INFORMATION:  The patient with worsening chest pain concerning for Redwood class 4 angina, referred for catheterization. Stress test shows anterior defect. PROCEDURE DETAILS:  After  informed consent was obtained, a 6-Bulgarian sheath was placed in the right femoral artery, a 6-Bulgarian JL4, JR4, angled pigtail were used for diagnostic angiography, LIMA angiography, and single vein graft angiography. Manual pressure will be used to gain hemostasis at the catheterization site. Conscious sedation was given by Quinton Gillis RN, beginning at 08:18 and concluding at 08:43 with a total of 2 mg Versed, 25 mcg of fentanyl. Vital signs and saturation were stable throughout. FINDINGS:  Left ventricle was normal size, normal systolic function. Ejection fraction is 55%. Left ventricular end diastolic pressure was 15 mmHg with an opening aortic pressure of 120/61. No gradient across the aortic valve. Left main coronary artery is in normal anatomic position, free of significant disease, bifurcates LAD and circumflex systems. No disease in left main. The LAD is diffusely diseased and heavily calcified proximally up to 90%. There is a 99% midvessel narrowing. There is a diagonal branch that is a very small vessel with a 90% vessel. It is heavily calcified and not an interventional target. Circumflex is a large, dominant vessel with 30% narrowing in a large obtuse marginal branch. The distal vessel just before the takeoff of the left PDA has a 50-70% narrowing. There is competitive filling in the distal vessel from the vein graft. The right coronary artery is a small nondominant vessel with a 60-70% proximal narrowing. This is a small vessel and felt best relegated to medical therapy.     The vein graft to the left PDA is widely patent. Good proximal and distal anastomosis and good runoff vessel. The LIMA to LAD is widely patent. Good proximal and distal anastomosis. The distal vessel was very tortuous, but appears unchanged from previous catheterization films. There was approximately 50% narrowing distal to the distal anastomosis site, but this is unchanged from previous catheterization films and is a small vessel, and once again felt best managed medically. Manual pressure will be used to gain hemostasis at catheterization site. CONCLUSION:   1. Significant multivessel coronary artery disease with 2/2 patent bypass grafts. 2.  Preserved left ventricular systolic function. RECOMMENDATIONS:  Ranexa 500 mg b.i.d. will be added to the patient's medical regimen to see if his symptoms improve.       MD TEJINDER Castellon / MARIANELA  D: 07/31/2018 09:01     T: 07/31/2018 09:52  JOB #: [id number]

## 2018-07-31 NOTE — PROGRESS NOTES
TRANSFER - OUT REPORT:    R femoral diagnostic [facility] with Dr Ileana Wasserman  Versed 2 mg  Fentanyl 25 mcg  6 fr sheath covered with tegaderm  No bleeding or hematoma noted at site. Site soft    Verbal report given to Priscilla(name) adam Montilla  being transferred to CPRU(unit) for routine progression of care       Report consisted of patients Situation, Background, Assessment and   Recommendations(SBAR). Information from the following report(s) Procedure Summary was reviewed with the receiving nurse. Lines:   Peripheral IV 07/31/18 Left Antecubital (Active)       Peripheral IV 07/31/18 Right Forearm (Active)        Opportunity for questions and clarification was provided.       Patient transported with:   Registered Nurse

## 2018-10-19 ENCOUNTER — HOSPITAL ENCOUNTER (OUTPATIENT)
Dept: LAB | Age: 63
Discharge: HOME | End: 2018-10-19
Payer: COMMERCIAL

## 2018-10-19 DIAGNOSIS — I25.10 CORONARY ARTERY DISEASE INVOLVING NATIVE CORONARY ARTERY OF NATIVE HEART WITHOUT ANGINA PECTORIS: ICD-10-CM

## 2018-10-19 LAB
ALBUMIN SERPL-MCNC: 3.9 G/DL (ref 3.2–4.6)
ALBUMIN/GLOB SERPL: 1.1 {RATIO}
ALP SERPL-CCNC: 91 U/L (ref 50–136)
ALT SERPL-CCNC: 37 U/L (ref 12–65)
ANION GAP SERPL CALC-SCNC: 8 MMOL/L
AST SERPL-CCNC: 34 U/L (ref 15–37)
BILIRUB SERPL-MCNC: 0.8 MG/DL (ref 0.2–1.1)
BUN SERPL-MCNC: 29 MG/DL (ref 8–23)
CALCIUM SERPL-MCNC: 8.9 MG/DL (ref 8.3–10.4)
CHLORIDE SERPL-SCNC: 100 MMOL/L (ref 98–107)
CHOLEST SERPL-MCNC: 200 MG/DL
CO2 SERPL-SCNC: 27 MMOL/L (ref 21–32)
CREAT SERPL-MCNC: 0.9 MG/DL (ref 0.8–1.5)
GLOBULIN SER CALC-MCNC: 3.7 G/DL
GLUCOSE SERPL-MCNC: 93 MG/DL (ref 65–100)
HDLC SERPL-MCNC: 71 MG/DL (ref 40–60)
HDLC SERPL: 2.8 {RATIO}
LDLC SERPL CALC-MCNC: 110.6 MG/DL
LIPID PROFILE,FLP: ABNORMAL
POTASSIUM SERPL-SCNC: 4 MMOL/L (ref 3.5–5.1)
PROT SERPL-MCNC: 7.6 G/DL (ref 6.3–8.2)
SODIUM SERPL-SCNC: 135 MMOL/L (ref 136–145)
TRIGL SERPL-MCNC: 92 MG/DL (ref 35–150)
VLDLC SERPL CALC-MCNC: 18.4 MG/DL (ref 6–23)

## 2018-10-19 PROCEDURE — 80053 COMPREHEN METABOLIC PANEL: CPT

## 2018-10-19 PROCEDURE — 80061 LIPID PANEL: CPT

## 2018-10-19 PROCEDURE — 36415 COLL VENOUS BLD VENIPUNCTURE: CPT

## 2019-10-31 ENCOUNTER — HOSPITAL ENCOUNTER (OUTPATIENT)
Dept: LAB | Age: 64
Discharge: HOME | End: 2019-10-31
Attending: INTERNAL MEDICINE
Payer: MEDICARE

## 2019-10-31 DIAGNOSIS — E78.00 HYPERCHOLESTEROLEMIA: ICD-10-CM

## 2019-10-31 DIAGNOSIS — I25.10 CORONARY ARTERY DISEASE INVOLVING NATIVE CORONARY ARTERY OF NATIVE HEART WITHOUT ANGINA PECTORIS: ICD-10-CM

## 2019-10-31 DIAGNOSIS — I10 ESSENTIAL HYPERTENSION: ICD-10-CM

## 2019-10-31 LAB
ALBUMIN SERPL-MCNC: 4.2 G/DL (ref 3.2–4.6)
ALBUMIN/GLOB SERPL: 1.3 {RATIO} (ref 1.2–3.5)
ALP SERPL-CCNC: 96 U/L (ref 50–136)
ALT SERPL-CCNC: 49 U/L (ref 12–65)
ANION GAP SERPL CALC-SCNC: 8 MMOL/L (ref 7–16)
AST SERPL-CCNC: 44 U/L (ref 15–37)
BILIRUB SERPL-MCNC: 0.9 MG/DL (ref 0.2–1.1)
BUN SERPL-MCNC: 20 MG/DL (ref 8–23)
CALCIUM SERPL-MCNC: 9.2 MG/DL (ref 8.3–10.4)
CHLORIDE SERPL-SCNC: 103 MMOL/L (ref 98–107)
CO2 SERPL-SCNC: 27 MMOL/L (ref 21–32)
CREAT SERPL-MCNC: 0.9 MG/DL (ref 0.8–1.5)
GLOBULIN SER CALC-MCNC: 3.2 G/DL (ref 2.3–3.5)
GLUCOSE SERPL-MCNC: 79 MG/DL (ref 65–100)
POTASSIUM SERPL-SCNC: 4.6 MMOL/L (ref 3.5–5.1)
PROT SERPL-MCNC: 7.4 G/DL (ref 6.3–8.2)
SODIUM SERPL-SCNC: 138 MMOL/L (ref 136–145)

## 2019-10-31 PROCEDURE — 80053 COMPREHEN METABOLIC PANEL: CPT

## 2019-10-31 PROCEDURE — 36415 COLL VENOUS BLD VENIPUNCTURE: CPT

## 2019-11-01 ENCOUNTER — HOSPITAL ENCOUNTER (OUTPATIENT)
Dept: LAB | Age: 64
Discharge: HOME | End: 2019-11-01
Payer: MEDICARE

## 2019-11-01 PROCEDURE — 82705 FATS/LIPIDS FECES QUAL: CPT

## 2019-11-04 LAB
FAT STL QL: NORMAL
NEUTRAL FAT STL QL: NORMAL

## 2021-02-12 NOTE — CONSULTS
[address] [facility]  Rosaurasonny Argueta. MD Tereso Curry. MD Michael Allan. MD Cori Trinidad       [id number]  7/25/2017        1955        DATE OF CONSULTATION:  7/25/2017    CAD    HISTORY OF PRESENT ILLNESS:  The patient is a 59 y.o. male who is seen for evaluation of CAD. Pt has noted increased fatigue over past few months. Family also notes that pt \" not himself \" and shows fatigue and recent SOB with activity. He had Ca score greater than 1000. No CP, no CHF, no MI. Risk factors for CAD include age, family history, dyslipidemia and hypertension. Cath shows proximal tubular narrowing of LAD and discrete stenosis of left PDA. Good LV function    Past Medical History:   Diagnosis Date    Hypercholesterolemia     Hypertension     Leukopenia        Past Surgical History:   Procedure Laterality Date    HX ORTHOPAEDIC      rt shoulder/rt hand       Family History   Problem Relation Age of Onset    Heart Disease Father        Social History     Social History    Marital status:      Spouse name: N/A    Number of children: N/A    Years of education: N/A     Occupational History    Not on file. Social History Main Topics    Smoking status: Former Smoker     Quit date: 7/10/1997    Smokeless tobacco: Never Used      Comment: x 20 yrs ago    Alcohol use Yes    Drug use: No    Sexual activity: Not on file     Other Topics Concern    Not on file     Social History Narrative       No Known Allergies    No current facility-administered medications on file prior to encounter. Current Outpatient Prescriptions on File Prior to Encounter   Medication Sig Dispense Refill    aspirin delayed-release 81 mg tablet Take  by mouth daily.  benazepril (LOTENSIN) 40 mg tablet Take 40 mg by mouth daily.  multivitamin (ONE A DAY) tablet Take 1 Tab by mouth daily.       fish oil-omega-3 fatty acids 340-1,000 mg capsule Take 1 Cap by mouth daily.      b complex vitamins (B COMPLEX 1) tablet Take 1 Tab by mouth daily.  simvastatin (ZOCOR) 80 mg tablet Take 80 mg by mouth nightly. REVIEW OF SYSTEMS:  GENERAL:  No weight loss. No fever. EYES:  No diplopia. No vision loss. ENTM:  No hearing loss. No trouble swallowing. No hoarseness. CARDIAC:  See present illness. PULMONARY:  Denies asthma or chronic pulmonary disease. GI:  No change in bowel habits. No bleeding. :  No dysuria. No history of renal stones or renal insufficiency. MS:  Denies osteoarthritis. NEURO:  No stroke or seizure disorder  HEME/LYMPHATIC:  No history of bleeding tendencies. PSYCHIATRIC:  No history of depression. Full review of organ systems shows negative findings except for those described above. PHYSICAL EXAMINATION:  GENERAL APPEARANCE:  Well developed. Well nourished. Alert, cooperative and oriented times three. HEENT:  Normocephalic. Extraocular muscles are intact. No scleral icterus. NECK/LYMPHATIC:  Supple with no carotid bruits. No jugular venous distention. LUNGS: Lungs are clear to auscultation. HEART:  Heart is regular rate and rhythm without a murmur. ABDOMEN:  Soft and non-tender. SKIN:  No rashes, cyanosis, jaundice, ecchymoses or evidence of skin breakdown present. EXTREMITIES:  Full range of motion. No deformity. No peripheral edema. VASCULAR:  Pulses are full and equal at the radial arteries and the popliteal arteries bilaterally. There is no venous stasis disease. NEURO:  Grossly intact. IMPRESSION:  CAD    PLAN:  Cardiac catheterization has shown CAD. I have recommended CABG. The risk of surgery should be 1%. Possible complications might include a stroke, myocardial infarction, bleeding, or infection. There could also be renal or respiratory insufficiency. Any complication may require return to the operating room. Pt will consider and call to schedule.     I have personally viewed cardiac catheterization and reviewed labs and chest x-ray.     [facility] CATH 2 ALL EVENTS no...

## 2022-03-18 PROBLEM — R60.0 LEG EDEMA, LEFT: Status: ACTIVE | Noted: 2017-09-26

## 2022-03-19 PROBLEM — Z95.1 S/P CABG (CORONARY ARTERY BYPASS GRAFT): Status: ACTIVE | Noted: 2017-07-31

## 2022-03-19 PROBLEM — I35.1 NONRHEUMATIC AORTIC VALVE INSUFFICIENCY: Status: ACTIVE | Noted: 2018-04-30

## 2022-03-19 PROBLEM — I25.10 CORONARY ARTERY DISEASE INVOLVING NATIVE CORONARY ARTERY OF NATIVE HEART WITHOUT ANGINA PECTORIS: Status: ACTIVE | Noted: 2017-07-31

## 2022-03-19 PROBLEM — Z82.49 FAMILY HISTORY OF ISCHEMIC HEART DISEASE (IHD): Status: ACTIVE | Noted: 2017-07-10

## 2022-03-19 PROBLEM — Z95.1 HX OF CABG: Status: ACTIVE | Noted: 2017-08-10

## 2022-03-19 PROBLEM — R06.02 SHORTNESS OF BREATH: Status: ACTIVE | Noted: 2017-07-10

## 2022-03-20 PROBLEM — I77.9 BILATERAL CAROTID ARTERY DISEASE (HCC): Status: ACTIVE | Noted: 2017-09-20

## 2022-11-29 ENCOUNTER — TELEPHONE (OUTPATIENT)
Dept: CARDIOLOGY CLINIC | Age: 67
End: 2022-11-29

## 2022-11-29 DIAGNOSIS — I25.10 ATHEROSCLEROTIC HEART DISEASE OF NATIVE CORONARY ARTERY WITHOUT ANGINA PECTORIS: Primary | ICD-10-CM

## 2022-11-29 DIAGNOSIS — I10 ESSENTIAL (PRIMARY) HYPERTENSION: ICD-10-CM

## 2022-11-29 DIAGNOSIS — E78.00 PURE HYPERCHOLESTEROLEMIA, UNSPECIFIED: ICD-10-CM

## 2022-11-29 NOTE — TELEPHONE ENCOUNTER
States he normally has labs drawn before the appt but does not currently have any ordered.  Would Dr. Chano Bryant like for labs to be drawn before the apt?

## 2022-12-14 DIAGNOSIS — I10 ESSENTIAL (PRIMARY) HYPERTENSION: ICD-10-CM

## 2022-12-14 DIAGNOSIS — E78.00 PURE HYPERCHOLESTEROLEMIA, UNSPECIFIED: ICD-10-CM

## 2022-12-14 DIAGNOSIS — I25.10 ATHEROSCLEROTIC HEART DISEASE OF NATIVE CORONARY ARTERY WITHOUT ANGINA PECTORIS: ICD-10-CM

## 2022-12-14 LAB
BASOPHILS # BLD: 0 K/UL (ref 0–0.2)
BASOPHILS NFR BLD: 0 % (ref 0–2)
DIFFERENTIAL METHOD BLD: NORMAL
EOSINOPHIL # BLD: 0.1 K/UL (ref 0–0.8)
EOSINOPHIL NFR BLD: 1 % (ref 0.5–7.8)
ERYTHROCYTE [DISTWIDTH] IN BLOOD BY AUTOMATED COUNT: 12.9 % (ref 11.9–14.6)
HCT VFR BLD AUTO: 46.1 % (ref 41.1–50.3)
HGB BLD-MCNC: 15 G/DL (ref 13.6–17.2)
IMM GRANULOCYTES # BLD AUTO: 0 K/UL (ref 0–0.5)
IMM GRANULOCYTES NFR BLD AUTO: 0 % (ref 0–5)
LYMPHOCYTES # BLD: 1.9 K/UL (ref 0.5–4.6)
LYMPHOCYTES NFR BLD: 39 % (ref 13–44)
MCH RBC QN AUTO: 29.6 PG (ref 26.1–32.9)
MCHC RBC AUTO-ENTMCNC: 32.5 G/DL (ref 31.4–35)
MCV RBC AUTO: 90.9 FL (ref 82–102)
MONOCYTES # BLD: 0.5 K/UL (ref 0.1–1.3)
MONOCYTES NFR BLD: 10 % (ref 4–12)
NEUTS SEG # BLD: 2.4 K/UL (ref 1.7–8.2)
NEUTS SEG NFR BLD: 50 % (ref 43–78)
NRBC # BLD: 0 K/UL (ref 0–0.2)
PLATELET # BLD AUTO: 169 K/UL (ref 150–450)
PMV BLD AUTO: 9.7 FL (ref 9.4–12.3)
RBC # BLD AUTO: 5.07 M/UL (ref 4.23–5.6)
WBC # BLD AUTO: 4.9 K/UL (ref 4.3–11.1)

## 2022-12-15 LAB
ALBUMIN SERPL-MCNC: 4.3 G/DL (ref 3.2–4.6)
ALBUMIN/GLOB SERPL: 1.6 (ref 0.4–1.6)
ALP SERPL-CCNC: 118 U/L (ref 50–136)
ALT SERPL-CCNC: 56 U/L (ref 12–65)
ANION GAP SERPL CALC-SCNC: 8 MMOL/L (ref 2–11)
AST SERPL-CCNC: 44 U/L (ref 15–37)
BILIRUB SERPL-MCNC: 1 MG/DL (ref 0.2–1.1)
BUN SERPL-MCNC: 17 MG/DL (ref 8–23)
CALCIUM SERPL-MCNC: 9.4 MG/DL (ref 8.3–10.4)
CHLORIDE SERPL-SCNC: 107 MMOL/L (ref 101–110)
CHOLEST SERPL-MCNC: 145 MG/DL
CO2 SERPL-SCNC: 26 MMOL/L (ref 21–32)
CREAT SERPL-MCNC: 0.8 MG/DL (ref 0.8–1.5)
GLOBULIN SER CALC-MCNC: 2.7 G/DL (ref 2.8–4.5)
GLUCOSE SERPL-MCNC: 85 MG/DL (ref 65–100)
HDLC SERPL-MCNC: 52 MG/DL (ref 40–60)
HDLC SERPL: 2.8
LDLC SERPL CALC-MCNC: 84.8 MG/DL
POTASSIUM SERPL-SCNC: 4.7 MMOL/L (ref 3.5–5.1)
PROT SERPL-MCNC: 7 G/DL (ref 6.3–8.2)
SODIUM SERPL-SCNC: 141 MMOL/L (ref 133–143)
TRIGL SERPL-MCNC: 41 MG/DL (ref 35–150)
VLDLC SERPL CALC-MCNC: 8.2 MG/DL (ref 6–23)

## 2022-12-22 ENCOUNTER — OFFICE VISIT (OUTPATIENT)
Dept: CARDIOLOGY CLINIC | Age: 67
End: 2022-12-22

## 2022-12-22 VITALS
WEIGHT: 164 LBS | DIASTOLIC BLOOD PRESSURE: 78 MMHG | BODY MASS INDEX: 24.86 KG/M2 | HEART RATE: 62 BPM | HEIGHT: 68 IN | SYSTOLIC BLOOD PRESSURE: 142 MMHG

## 2022-12-22 DIAGNOSIS — I65.23 BILATERAL CAROTID ARTERY STENOSIS: ICD-10-CM

## 2022-12-22 DIAGNOSIS — I35.1 NONRHEUMATIC AORTIC VALVE INSUFFICIENCY: ICD-10-CM

## 2022-12-22 DIAGNOSIS — I10 PRIMARY HYPERTENSION: ICD-10-CM

## 2022-12-22 DIAGNOSIS — I25.10 ATHEROSCLEROSIS OF NATIVE CORONARY ARTERY OF NATIVE HEART WITHOUT ANGINA PECTORIS: Primary | ICD-10-CM

## 2022-12-22 DIAGNOSIS — I25.10 CORONARY ARTERY DISEASE INVOLVING NATIVE CORONARY ARTERY OF NATIVE HEART WITHOUT ANGINA PECTORIS: ICD-10-CM

## 2022-12-22 RX ORDER — AMOXICILLIN 500 MG
2400 CAPSULE ORAL DAILY
COMMUNITY

## 2022-12-22 NOTE — PROGRESS NOTES
[address], [address] [facility], [address] [place] [address]  PHONE: [phone]     22    NAME:  Jasmina Lin  : 1955  MRN: [id number]       SUBJECTIVE:   Jasmina Lin is a 69 y.o. male seen for a follow up visit regarding the following:     Chief Complaint   Patient presents with    Coronary Artery Disease       HPI:    Here for eval of CV dz, 2v CABG on 17. EF normal.     Right CEA 2017 in [place]. ------ followed at [facility] 10/2017 and 2021: low normal EF, mild AI. [facility] 18: 2/2 patent grafts, normal EF      Feeling well on imdur. Home SBP < 140. Active, exercising. NO new angina, CP, PELLETIER. Walking dog, active. 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 2 dogs now. To [place] now every 12 mos. Patient denies recent history of orthopnea, PND, excessive dizziness and/or syncope. Daughter is Danna Yates in [facility]. Wife is former nurse as well. Past Medical History, Past Surgical History, Family history, Social History, and Medications were all reviewed with the patient today and updated as necessary.      Current Outpatient Medications   Medication Sig Dispense Refill    Omega-3 Fatty Acids (FISH OIL) 1200 MG CAPS Take 2,400 mg by mouth daily      Multiple Vitamins-Minerals (CENTRUM SILVER 50+MEN PO) Take 1 tablet by mouth daily      B Complex Vitamins (VITAMIN B COMPLEX PO) Take 1 tablet by mouth daily      aspirin 81 MG EC tablet Take 81 mg by mouth      atorvastatin (LIPITOR) 80 MG tablet Take 80 mg by mouth daily      calcium carbonate 1500 (600 Ca) MG TABS tablet Take 600 mg by mouth daily      vitamin D 25 MCG (1000 UT) CAPS Take 800 Units by mouth daily      ezetimibe (ZETIA) 10 MG tablet Take 10 mg by mouth daily      isosorbide mononitrate (IMDUR) 30 MG extended release tablet Take 30 mg by mouth daily      metoprolol succinate (TOPROL XL) 50 MG extended release tablet Take 50 mg by mouth daily      irbesartan (AVAPRO) 75 MG tablet Take 75 mg by mouth No current facility-administered medications for this visit. No Known Allergies  Patient Active Problem List    Diagnosis Date Noted    Nonrheumatic aortic valve insufficiency 2018    Leg edema, left 2017    Bilateral carotid artery disease ([address] 2017    Hx of CABG 08/10/2017    Hypercholesterolemia     Hypertension     Coronary artery disease involving native coronary artery of native heart without angina pectoris 2017 (Dr Ronald Cage) CORONARY ARTERY BYPASS GRAFT X 2, LIMA to the LAD, SVG   to the (L) PDA    VEIN HARVEST GREATER SAPHENOUS  ESOPHAGEAL TRANS ECHOCARDIOGRAM        S/P CABG (coronary artery bypass graft) 2017    Shortness of breath 07/10/2017    Family history of ischemic heart disease (IHD) 07/10/2017      Past Surgical History:   Procedure Laterality Date    COLONOSCOPY      ORTHOPEDIC SURGERY      rt shoulder/rt hand    OK CARDIAC SURG PROCEDURE UNLIST      CABG    TONSILLECTOMY      UPPER GASTROINTESTINAL ENDOSCOPY       Family History   Problem Relation Age of Onset    Hypertension Mother     Heart Disease Father      Social History     Tobacco Use    Smoking status: Former     Packs/day: 1.00     Types: Cigarettes     Quit date: 7/10/1991     Years since quittin.4    Smokeless tobacco: Never    Tobacco comments:     Quit smoking: x 20 yrs ago   Substance Use Topics    Alcohol use: Yes     Alcohol/week: 21.0 standard drinks         ROS:    No obvious pertinent positives on review of systems except for what was outlined in the HPI today.       PHYSICAL EXAM:     BP (!) 142/78   Pulse 62   Ht 5' 8\" (1.727 m)   Wt 164 lb (74.4 kg)   BMI 24.94 kg/m²    General/Constitutional:   Alert and oriented x 3, no acute distress  HEENT:   normocephalic, atraumatic, moist mucous membranes  Neck:   No JVD or carotid bruits bilaterally  Cardiovascular:   regular rate and rhythm, no murmur/rub/gallop appreciated  Pulmonary:   clear to auscultation bilaterally, no respiratory distress  Abdomen:   soft, non-tender, non-distended  Ext:   No sig LE edema bilaterally  Skin:  warm and dry, no obvious rashes seen  Neuro:   no obvious sensory or motor deficits  Psychiatric:   normal mood and affect    EKG Today and independently reviewed by me: sinus rhythm, normal intervals and non-specific ST/T wave changes. Lab Results   Component Value Date/Time     12/14/2022 11:59 AM    K 4.7 12/14/2022 11:59 AM     12/14/2022 11:59 AM    CO2 26 12/14/2022 11:59 AM    BUN 17 12/14/2022 11:59 AM    CREATININE 0.80 12/14/2022 11:59 AM    GLUCOSE 85 12/14/2022 11:59 AM    CALCIUM 9.4 12/14/2022 11:59 AM        Lab Results   Component Value Date    WBC 4.9 12/14/2022    HGB 15.0 12/14/2022    HCT 46.1 12/14/2022    MCV 90.9 12/14/2022     12/14/2022       No results found for: TSHFT4, TSH    No results found for: LABA1C  No results found for: EAG    Lab Results   Component Value Date    CHOL 145 12/14/2022     Lab Results   Component Value Date    TRIG 41 12/14/2022     Lab Results   Component Value Date    HDL 52 12/14/2022     Lab Results   Component Value Date    LDLCALC 84.8 12/14/2022     Lab Results   Component Value Date    LABVLDL 8.2 12/14/2022     Lab Results   Component Value Date    CHOLHDLRATIO 2.8 12/14/2022           I have Independently reviewed prior care notes, any ER records available, cardiac testing, labs and results with the patient and before seeing the patient today. Also independently reviewed outside records when available. ASSESSMENT:    Chasity Hudson was seen today for coronary artery disease.     Diagnoses and all orders for this visit:    Atherosclerotic heart disease of native coronary artery without angina pectoris  -     EKG 12 Lead    Bilateral carotid artery stenosis    Nonrheumatic aortic valve insufficiency    Primary hypertension    Coronary artery disease involving native coronary artery of native heart without angina pectoris        PLAN:      1. HTN:   Remain on BB and imdur. Well controlled. 2. CAD: no angina. Follow for more PELLETIER on the rower. Remain on ASA, BB and statin. Doing well on meds. Remain on imdur, no ranexa needed. He has no CP. 3. AI:  Mild, follow for now. Echo ok. 4. Carotid dz:  Seen in [facility] yearly now. 5. HPL: diet better, remain on lipitor and zetia. Doing well with exercise and diet. Goal LDL < 70. Labs every 12 mos. Follow LFTs as reviewed. Patient has been instructed and agrees to call our office with any issues or other concerns related to their cardiac condition(s) and/or complaint(s). Return in about 6 months (around 6/22/2023).        SHILOH RANDLE, DO  12/22/2022

## 2023-06-27 ENCOUNTER — OFFICE VISIT (OUTPATIENT)
Age: 68
End: 2023-06-27
Payer: MEDICARE

## 2023-06-27 VITALS
BODY MASS INDEX: 24.86 KG/M2 | SYSTOLIC BLOOD PRESSURE: 122 MMHG | WEIGHT: 164 LBS | HEART RATE: 60 BPM | DIASTOLIC BLOOD PRESSURE: 72 MMHG | HEIGHT: 68 IN

## 2023-06-27 DIAGNOSIS — Z82.49 FAMILY HISTORY OF ISCHEMIC HEART DISEASE (IHD): ICD-10-CM

## 2023-06-27 DIAGNOSIS — I25.10 CORONARY ARTERY DISEASE INVOLVING NATIVE CORONARY ARTERY OF NATIVE HEART WITHOUT ANGINA PECTORIS: Primary | ICD-10-CM

## 2023-06-27 DIAGNOSIS — I35.1 NONRHEUMATIC AORTIC VALVE INSUFFICIENCY: ICD-10-CM

## 2023-06-27 DIAGNOSIS — I65.23 BILATERAL CAROTID ARTERY STENOSIS: ICD-10-CM

## 2023-06-27 DIAGNOSIS — Z95.1 HX OF CABG: ICD-10-CM

## 2023-06-27 DIAGNOSIS — Z95.1 S/P CABG (CORONARY ARTERY BYPASS GRAFT): ICD-10-CM

## 2023-06-27 DIAGNOSIS — I10 PRIMARY HYPERTENSION: ICD-10-CM

## 2023-06-27 PROCEDURE — 99214 OFFICE O/P EST MOD 30 MIN: CPT | Performed by: INTERNAL MEDICINE

## 2023-06-27 PROCEDURE — 1036F TOBACCO NON-USER: CPT | Performed by: INTERNAL MEDICINE

## 2023-06-27 PROCEDURE — 3017F COLORECTAL CA SCREEN DOC REV: CPT | Performed by: INTERNAL MEDICINE

## 2023-06-27 PROCEDURE — 3074F SYST BP LT 130 MM HG: CPT | Performed by: INTERNAL MEDICINE

## 2023-06-27 PROCEDURE — 3078F DIAST BP <80 MM HG: CPT | Performed by: INTERNAL MEDICINE

## 2023-06-27 PROCEDURE — G8420 CALC BMI NORM PARAMETERS: HCPCS | Performed by: INTERNAL MEDICINE

## 2023-06-27 PROCEDURE — G8428 CUR MEDS NOT DOCUMENT: HCPCS | Performed by: INTERNAL MEDICINE

## 2023-06-27 PROCEDURE — 1123F ACP DISCUSS/DSCN MKR DOCD: CPT | Performed by: INTERNAL MEDICINE

## 2023-07-27 RX ORDER — ATORVASTATIN CALCIUM 80 MG/1
80 TABLET, FILM COATED ORAL DAILY
Qty: 90 TABLET | Refills: 3 | Status: SHIPPED | OUTPATIENT
Start: 2023-07-27

## 2023-07-27 NOTE — TELEPHONE ENCOUNTER
MEDICATION REFILL REQUEST      Name of Medication:  Lipitor   Dose:  80 mg  Frequency:  daily   Quantity:    Days' supply:  90      Pharmacy Name/Location:  [facility]

## 2023-09-21 RX ORDER — METOPROLOL SUCCINATE 50 MG/1
50 TABLET, EXTENDED RELEASE ORAL DAILY
Qty: 90 TABLET | Refills: 3 | Status: SHIPPED | OUTPATIENT
Start: 2023-09-21

## 2023-09-21 NOTE — TELEPHONE ENCOUNTER
MEDICATION REFILL REQUEST      Name of Medication:  Metoprolol   Dose:  50 mg  Frequency:    Quantity:    Days' supply:  90      Pharmacy Name/Location:  [facility]

## 2023-11-15 ENCOUNTER — HOSPITAL ENCOUNTER (OUTPATIENT)
Age: 68
Setting detail: OBSERVATION
LOS: 1 days | Discharge: HOME | End: 2023-11-16
Attending: INTERNAL MEDICINE | Admitting: INTERNAL MEDICINE
Payer: MEDICARE

## 2023-11-15 DIAGNOSIS — R07.9 CHEST PAIN: ICD-10-CM

## 2023-11-15 DIAGNOSIS — I20.0 UNSTABLE ANGINA (HCC): Primary | ICD-10-CM

## 2023-11-15 DIAGNOSIS — Z95.1 S/P CABG (CORONARY ARTERY BYPASS GRAFT): ICD-10-CM

## 2023-11-15 DIAGNOSIS — R07.2 PRECORDIAL PAIN: ICD-10-CM

## 2023-11-15 DIAGNOSIS — I20.0 ACCELERATING ANGINA (HCC): ICD-10-CM

## 2023-11-15 PROCEDURE — G0378 HOSPITAL OBSERVATION PER HR: HCPCS

## 2023-11-15 PROCEDURE — 93005 ELECTROCARDIOGRAM TRACING: CPT | Performed by: INTERNAL MEDICINE

## 2023-11-15 PROCEDURE — 2580000003 HC RX 258: Performed by: NURSE PRACTITIONER

## 2023-11-15 PROCEDURE — 6370000000 HC RX 637 (ALT 250 FOR IP): Performed by: NURSE PRACTITIONER

## 2023-11-15 PROCEDURE — 99204 OFFICE O/P NEW MOD 45 MIN: CPT | Performed by: INTERNAL MEDICINE

## 2023-11-15 RX ORDER — SODIUM CHLORIDE 0.9 % (FLUSH) 0.9 %
5-40 SYRINGE (ML) INJECTION EVERY 12 HOURS SCHEDULED
Status: DISCONTINUED | OUTPATIENT
Start: 2023-11-15 | End: 2023-11-16 | Stop reason: HOSPADM

## 2023-11-15 RX ORDER — HEPARIN SODIUM 1000 [USP'U]/ML
60 INJECTION, SOLUTION INTRAVENOUS; SUBCUTANEOUS ONCE
Status: CANCELLED | OUTPATIENT
Start: 2023-11-15 | End: 2023-11-15

## 2023-11-15 RX ORDER — NITROGLYCERIN 0.4 MG/1
0.4 TABLET SUBLINGUAL EVERY 5 MIN PRN
Status: CANCELLED | OUTPATIENT
Start: 2023-11-15

## 2023-11-15 RX ORDER — ONDANSETRON 4 MG/1
4 TABLET, ORALLY DISINTEGRATING ORAL EVERY 8 HOURS PRN
Status: CANCELLED | OUTPATIENT
Start: 2023-11-15

## 2023-11-15 RX ORDER — EZETIMIBE 10 MG/1
10 TABLET ORAL DAILY
Status: DISCONTINUED | OUTPATIENT
Start: 2023-11-16 | End: 2023-11-16 | Stop reason: HOSPADM

## 2023-11-15 RX ORDER — HYDROCODONE BITARTRATE AND ACETAMINOPHEN 5; 325 MG/1; MG/1
1 TABLET ORAL EVERY 6 HOURS PRN
Status: DISCONTINUED | OUTPATIENT
Start: 2023-11-15 | End: 2023-11-16 | Stop reason: HOSPADM

## 2023-11-15 RX ORDER — SODIUM CHLORIDE 0.9 % (FLUSH) 0.9 %
5-40 SYRINGE (ML) INJECTION PRN
Status: DISCONTINUED | OUTPATIENT
Start: 2023-11-15 | End: 2023-11-16 | Stop reason: HOSPADM

## 2023-11-15 RX ORDER — HEPARIN SODIUM 1000 [USP'U]/ML
30 INJECTION, SOLUTION INTRAVENOUS; SUBCUTANEOUS PRN
Status: CANCELLED | OUTPATIENT
Start: 2023-11-15

## 2023-11-15 RX ORDER — HEPARIN SODIUM 10000 [USP'U]/100ML
5-30 INJECTION, SOLUTION INTRAVENOUS CONTINUOUS
Status: CANCELLED | OUTPATIENT
Start: 2023-11-15

## 2023-11-15 RX ORDER — MAGNESIUM HYDROXIDE/ALUMINUM HYDROXICE/SIMETHICONE 120; 1200; 1200 MG/30ML; MG/30ML; MG/30ML
30 SUSPENSION ORAL EVERY 6 HOURS PRN
Status: CANCELLED | OUTPATIENT
Start: 2023-11-15

## 2023-11-15 RX ORDER — ATORVASTATIN CALCIUM 80 MG/1
80 TABLET, FILM COATED ORAL DAILY
Status: CANCELLED | OUTPATIENT
Start: 2023-11-15

## 2023-11-15 RX ORDER — POLYETHYLENE GLYCOL 3350 17 G/17G
17 POWDER, FOR SOLUTION ORAL DAILY PRN
Status: CANCELLED | OUTPATIENT
Start: 2023-11-15

## 2023-11-15 RX ORDER — SODIUM CHLORIDE 0.9 % (FLUSH) 0.9 %
5-40 SYRINGE (ML) INJECTION EVERY 12 HOURS SCHEDULED
Status: CANCELLED | OUTPATIENT
Start: 2023-11-15

## 2023-11-15 RX ORDER — NITROGLYCERIN 0.4 MG/1
0.4 TABLET SUBLINGUAL EVERY 5 MIN PRN
Status: DISCONTINUED | OUTPATIENT
Start: 2023-11-15 | End: 2023-11-16 | Stop reason: HOSPADM

## 2023-11-15 RX ORDER — ATORVASTATIN CALCIUM 80 MG/1
80 TABLET, FILM COATED ORAL DAILY
Status: DISCONTINUED | OUTPATIENT
Start: 2023-11-16 | End: 2023-11-16 | Stop reason: HOSPADM

## 2023-11-15 RX ORDER — HEPARIN SODIUM 1000 [USP'U]/ML
60 INJECTION, SOLUTION INTRAVENOUS; SUBCUTANEOUS PRN
Status: CANCELLED | OUTPATIENT
Start: 2023-11-15

## 2023-11-15 RX ORDER — SODIUM CHLORIDE 9 MG/ML
INJECTION, SOLUTION INTRAVENOUS PRN
Status: CANCELLED | OUTPATIENT
Start: 2023-11-15

## 2023-11-15 RX ORDER — ASPIRIN 81 MG/1
81 TABLET ORAL DAILY
Status: DISCONTINUED | OUTPATIENT
Start: 2023-11-16 | End: 2023-11-16 | Stop reason: HOSPADM

## 2023-11-15 RX ORDER — ACETAMINOPHEN 325 MG/1
650 TABLET ORAL EVERY 6 HOURS PRN
Status: CANCELLED | OUTPATIENT
Start: 2023-11-15

## 2023-11-15 RX ORDER — ASPIRIN 81 MG/1
81 TABLET ORAL ONCE
Status: CANCELLED | OUTPATIENT
Start: 2023-11-15

## 2023-11-15 RX ORDER — MAGNESIUM SULFATE IN WATER 40 MG/ML
2000 INJECTION, SOLUTION INTRAVENOUS PRN
Status: DISCONTINUED | OUTPATIENT
Start: 2023-11-15 | End: 2023-11-16 | Stop reason: HOSPADM

## 2023-11-15 RX ORDER — SODIUM CHLORIDE 9 MG/ML
INJECTION, SOLUTION INTRAVENOUS CONTINUOUS
Status: DISCONTINUED | OUTPATIENT
Start: 2023-11-16 | End: 2023-11-16 | Stop reason: HOSPADM

## 2023-11-15 RX ORDER — METOPROLOL SUCCINATE 25 MG/1
50 TABLET, EXTENDED RELEASE ORAL DAILY
Status: DISCONTINUED | OUTPATIENT
Start: 2023-11-16 | End: 2023-11-16 | Stop reason: HOSPADM

## 2023-11-15 RX ORDER — ACETAMINOPHEN 325 MG/1
650 TABLET ORAL EVERY 6 HOURS PRN
Status: DISCONTINUED | OUTPATIENT
Start: 2023-11-15 | End: 2023-11-16 | Stop reason: HOSPADM

## 2023-11-15 RX ORDER — EZETIMIBE 10 MG/1
10 TABLET ORAL DAILY
Status: CANCELLED | OUTPATIENT
Start: 2023-11-15

## 2023-11-15 RX ORDER — POTASSIUM CHLORIDE 7.45 MG/ML
10 INJECTION INTRAVENOUS PRN
Status: DISCONTINUED | OUTPATIENT
Start: 2023-11-15 | End: 2023-11-16 | Stop reason: HOSPADM

## 2023-11-15 RX ORDER — ISOSORBIDE MONONITRATE 30 MG/1
30 TABLET, EXTENDED RELEASE ORAL DAILY
Status: CANCELLED | OUTPATIENT
Start: 2023-11-15

## 2023-11-15 RX ORDER — ASPIRIN 81 MG/1
81 TABLET, CHEWABLE ORAL DAILY
Status: DISCONTINUED | OUTPATIENT
Start: 2023-11-16 | End: 2023-11-15 | Stop reason: SDUPTHER

## 2023-11-15 RX ORDER — MORPHINE SULFATE 2 MG/ML
2 INJECTION, SOLUTION INTRAMUSCULAR; INTRAVENOUS EVERY 4 HOURS PRN
Status: DISCONTINUED | OUTPATIENT
Start: 2023-11-15 | End: 2023-11-16 | Stop reason: HOSPADM

## 2023-11-15 RX ORDER — METOPROLOL SUCCINATE 25 MG/1
50 TABLET, EXTENDED RELEASE ORAL DAILY
Status: CANCELLED | OUTPATIENT
Start: 2023-11-15

## 2023-11-15 RX ORDER — ONDANSETRON 2 MG/ML
4 INJECTION INTRAMUSCULAR; INTRAVENOUS EVERY 6 HOURS PRN
Status: CANCELLED | OUTPATIENT
Start: 2023-11-15

## 2023-11-15 RX ORDER — ONDANSETRON 2 MG/ML
4 INJECTION INTRAMUSCULAR; INTRAVENOUS EVERY 4 HOURS PRN
Status: DISCONTINUED | OUTPATIENT
Start: 2023-11-15 | End: 2023-11-16 | Stop reason: HOSPADM

## 2023-11-15 RX ORDER — SODIUM CHLORIDE 0.9 % (FLUSH) 0.9 %
5-40 SYRINGE (ML) INJECTION PRN
Status: CANCELLED | OUTPATIENT
Start: 2023-11-15

## 2023-11-15 RX ORDER — POTASSIUM CHLORIDE 20 MEQ/1
40 TABLET, EXTENDED RELEASE ORAL PRN
Status: CANCELLED | OUTPATIENT
Start: 2023-11-15

## 2023-11-15 RX ORDER — POLYETHYLENE GLYCOL 3350 17 G/17G
17 POWDER, FOR SOLUTION ORAL DAILY PRN
Status: DISCONTINUED | OUTPATIENT
Start: 2023-11-15 | End: 2023-11-16 | Stop reason: HOSPADM

## 2023-11-15 RX ORDER — POTASSIUM CHLORIDE 20 MEQ/1
40 TABLET, EXTENDED RELEASE ORAL PRN
Status: DISCONTINUED | OUTPATIENT
Start: 2023-11-15 | End: 2023-11-16 | Stop reason: HOSPADM

## 2023-11-15 RX ORDER — MAGNESIUM SULFATE IN WATER 40 MG/ML
2000 INJECTION, SOLUTION INTRAVENOUS PRN
Status: CANCELLED | OUTPATIENT
Start: 2023-11-15

## 2023-11-15 RX ORDER — POTASSIUM CHLORIDE 7.45 MG/ML
10 INJECTION INTRAVENOUS PRN
Status: CANCELLED | OUTPATIENT
Start: 2023-11-15

## 2023-11-15 RX ORDER — AMLODIPINE BESYLATE 5 MG/1
5 TABLET ORAL DAILY
Status: DISCONTINUED | OUTPATIENT
Start: 2023-11-15 | End: 2023-11-16 | Stop reason: HOSPADM

## 2023-11-15 RX ADMIN — AMLODIPINE BESYLATE 5 MG: 5 TABLET ORAL at 21:54

## 2023-11-15 RX ADMIN — SODIUM CHLORIDE, PRESERVATIVE FREE 5 ML: 5 INJECTION INTRAVENOUS at 21:56

## 2023-11-16 ENCOUNTER — APPOINTMENT (OUTPATIENT)
Dept: NON INVASIVE DIAGNOSTICS | Age: 68
End: 2023-11-16
Attending: INTERNAL MEDICINE
Payer: MEDICARE

## 2023-11-16 VITALS
SYSTOLIC BLOOD PRESSURE: 129 MMHG | WEIGHT: 160.8 LBS | DIASTOLIC BLOOD PRESSURE: 70 MMHG | TEMPERATURE: 97.3 F | HEART RATE: 53 BPM | OXYGEN SATURATION: 100 % | BODY MASS INDEX: 24.37 KG/M2 | HEIGHT: 68 IN | RESPIRATION RATE: 16 BRPM

## 2023-11-16 PROBLEM — I20.0 ACCELERATING ANGINA (HCC): Status: RESOLVED | Noted: 2023-11-15 | Resolved: 2023-11-16

## 2023-11-16 PROBLEM — I20.0 UNSTABLE ANGINA (HCC): Status: RESOLVED | Noted: 2023-11-15 | Resolved: 2023-11-16

## 2023-11-16 LAB
ANION GAP SERPL CALC-SCNC: 5 MMOL/L (ref 2–11)
BUN SERPL-MCNC: 13 MG/DL (ref 8–23)
CALCIUM SERPL-MCNC: 8.7 MG/DL (ref 8.3–10.4)
CHLORIDE SERPL-SCNC: 108 MMOL/L (ref 101–110)
CHOLEST SERPL-MCNC: 151 MG/DL
CO2 SERPL-SCNC: 28 MMOL/L (ref 21–32)
CREAT SERPL-MCNC: 0.9 MG/DL (ref 0.8–1.5)
ECHO AO ASC DIAM: 3.4 CM
ECHO AO ASCENDING AORTA INDEX: 1.83 CM/M2
ECHO AO ROOT DIAM: 3.3 CM
ECHO AO ROOT INDEX: 1.77 CM/M2
ECHO AV AREA PEAK VELOCITY: 2.3 CM2
ECHO AV AREA VTI: 2.3 CM2
ECHO AV AREA/BSA PEAK VELOCITY: 1.2 CM2/M2
ECHO AV AREA/BSA VTI: 1.2 CM2/M2
ECHO AV MEAN GRADIENT: 3 MMHG
ECHO AV MEAN GRADIENT: 3 MMHG
ECHO AV MEAN VELOCITY: 0.8 M/S
ECHO AV PEAK GRADIENT: 5 MMHG
ECHO AV PEAK VELOCITY: 1.2 M/S
ECHO AV VELOCITY RATIO: 0.67
ECHO AV VTI: 27.5 CM
ECHO BSA: 1.87 M2
ECHO BSA: 1.87 M2
ECHO EST RA PRESSURE: 3 MMHG
ECHO LA AREA 2C: 12.4 CM2
ECHO LA AREA 4C: 12 CM2
ECHO LA DIAMETER INDEX: 1.94 CM/M2
ECHO LA DIAMETER: 3.6 CM
ECHO LA MAJOR AXIS: 4.2 CM
ECHO LA MINOR AXIS: 4.7 CM
ECHO LA TO AORTIC ROOT RATIO: 1.09
ECHO LA VOL BP: 29 ML (ref 18–58)
ECHO LA VOL MOD A2C: 26 ML (ref 18–58)
ECHO LA VOL MOD A4C: 28 ML (ref 18–58)
ECHO LA VOL/BSA BIPLANE: 16 ML/M2 (ref 16–34)
ECHO LA VOLUME INDEX MOD A2C: 14 ML/M2 (ref 16–34)
ECHO LA VOLUME INDEX MOD A4C: 15 ML/M2 (ref 16–34)
ECHO LV E' LATERAL VELOCITY: 13 CM/S
ECHO LV E' SEPTAL VELOCITY: 7 CM/S
ECHO LV EDV A2C: 88 ML
ECHO LV EDV A4C: 107 ML
ECHO LV EDV INDEX A4C: 58 ML/M2
ECHO LV EDV NDEX A2C: 47 ML/M2
ECHO LV EJECTION FRACTION A2C: 59 %
ECHO LV EJECTION FRACTION A4C: 56 %
ECHO LV EJECTION FRACTION BIPLANE: 58 % (ref 55–100)
ECHO LV ESV A2C: 36 ML
ECHO LV ESV A4C: 47 ML
ECHO LV ESV INDEX A2C: 19 ML/M2
ECHO LV ESV INDEX A4C: 25 ML/M2
ECHO LV FRACTIONAL SHORTENING: 31 % (ref 28–44)
ECHO LV INTERNAL DIMENSION DIASTOLE INDEX: 2.63 CM/M2
ECHO LV INTERNAL DIMENSION DIASTOLIC: 4.9 CM (ref 4.2–5.9)
ECHO LV INTERNAL DIMENSION SYSTOLIC INDEX: 1.83 CM/M2
ECHO LV INTERNAL DIMENSION SYSTOLIC: 3.4 CM
ECHO LV IVSD: 0.8 CM (ref 0.6–1)
ECHO LV MASS 2D: 141.9 G (ref 88–224)
ECHO LV MASS INDEX 2D: 76.3 G/M2 (ref 49–115)
ECHO LV POSTERIOR WALL DIASTOLIC: 0.9 CM (ref 0.6–1)
ECHO LV RELATIVE WALL THICKNESS RATIO: 0.37
ECHO LVOT AREA: 3.5 CM2
ECHO LVOT AV VTI INDEX: 0.67
ECHO LVOT DIAM: 2.1 CM
ECHO LVOT MEAN GRADIENT: 1 MMHG
ECHO LVOT MEAN GRADIENT: 1 MMHG
ECHO LVOT PEAK GRADIENT: 2 MMHG
ECHO LVOT PEAK VELOCITY: 0.8 M/S
ECHO LVOT STROKE VOLUME INDEX: 34.4 ML/M2
ECHO LVOT SV: 64 ML
ECHO LVOT VTI: 18.5 CM
ECHO MV A VELOCITY: 0.74 M/S
ECHO MV AREA VTI: 2.1 CM2
ECHO MV E DECELERATION TIME (DT): 204 MS
ECHO MV E VELOCITY: 0.65 M/S
ECHO MV E/A RATIO: 0.88
ECHO MV E/E' LATERAL: 5
ECHO MV E/E' RATIO (AVERAGED): 7.14
ECHO MV LVOT VTI INDEX: 1.69
ECHO MV MAX VELOCITY: 0.9 M/S
ECHO MV MEAN GRADIENT: 1 MMHG
ECHO MV MEAN VELOCITY: 0.5 M/S
ECHO MV PEAK GRADIENT: 3 MMHG
ECHO MV VTI: 31.2 CM
ECHO PV ACCELERATION TIME (AT): 92 MS
ECHO PV MAX VELOCITY: 0.9 M/S
ECHO PV PEAK GRADIENT: 3 MMHG
ECHO RIGHT VENTRICULAR SYSTOLIC PRESSURE (RVSP): 21 MMHG
ECHO RV FREE WALL PEAK S': 11 CM/S
ECHO RV INTERNAL DIMENSION: 3.1 CM
ECHO RV TAPSE: 1.8 CM (ref 1.7–?)
ECHO TV REGURGITANT MAX VELOCITY: 2.12 M/S
ECHO TV REGURGITANT PEAK GRADIENT: 18 MMHG
EKG ATRIAL RATE: 57 BPM
EKG DIAGNOSIS: NORMAL
EKG P AXIS: 48 DEGREES
EKG P-R INTERVAL: 164 MS
EKG Q-T INTERVAL: 422 MS
EKG QRS DURATION: 90 MS
EKG QTC CALCULATION (BAZETT): 410 MS
EKG R AXIS: 7 DEGREES
EKG T AXIS: 41 DEGREES
EKG VENTRICULAR RATE: 57 BPM
ERYTHROCYTE [DISTWIDTH] IN BLOOD BY AUTOMATED COUNT: 13.2 % (ref 11.9–14.6)
GLUCOSE SERPL-MCNC: 93 MG/DL (ref 65–100)
HCT VFR BLD AUTO: 46.9 % (ref 41.1–50.3)
HDLC SERPL-MCNC: 46 MG/DL (ref 40–60)
HDLC SERPL: 3.3
HGB BLD-MCNC: 15.4 G/DL (ref 13.6–17.2)
LDLC SERPL CALC-MCNC: 92.6 MG/DL
MAGNESIUM SERPL-MCNC: 2.2 MG/DL (ref 1.8–2.4)
MCH RBC QN AUTO: 29.9 PG (ref 26.1–32.9)
MCHC RBC AUTO-ENTMCNC: 32.8 G/DL (ref 31.4–35)
MCV RBC AUTO: 91.1 FL (ref 82–102)
NRBC # BLD: 0 K/UL (ref 0–0.2)
PLATELET # BLD AUTO: 153 K/UL (ref 150–450)
PMV BLD AUTO: 9 FL (ref 9.4–12.3)
POTASSIUM SERPL-SCNC: 3.6 MMOL/L (ref 3.5–5.1)
RBC # BLD AUTO: 5.15 M/UL (ref 4.23–5.6)
SODIUM SERPL-SCNC: 141 MMOL/L (ref 133–143)
TRIGL SERPL-MCNC: 62 MG/DL (ref 35–150)
VLDLC SERPL CALC-MCNC: 12.4 MG/DL (ref 6–23)
WBC # BLD AUTO: 5 K/UL (ref 4.3–11.1)

## 2023-11-16 PROCEDURE — 85027 COMPLETE CBC AUTOMATED: CPT

## 2023-11-16 PROCEDURE — 6360000004 HC RX CONTRAST MEDICATION: Performed by: INTERNAL MEDICINE

## 2023-11-16 PROCEDURE — 93459 L HRT ART/GRFT ANGIO: CPT | Performed by: INTERNAL MEDICINE

## 2023-11-16 PROCEDURE — 6360000002 HC RX W HCPCS: Performed by: INTERNAL MEDICINE

## 2023-11-16 PROCEDURE — G0378 HOSPITAL OBSERVATION PER HR: HCPCS

## 2023-11-16 PROCEDURE — 93306 TTE W/DOPPLER COMPLETE: CPT

## 2023-11-16 PROCEDURE — 2500000003 HC RX 250 WO HCPCS: Performed by: INTERNAL MEDICINE

## 2023-11-16 PROCEDURE — 36415 COLL VENOUS BLD VENIPUNCTURE: CPT

## 2023-11-16 PROCEDURE — 99152 MOD SED SAME PHYS/QHP 5/>YRS: CPT | Performed by: INTERNAL MEDICINE

## 2023-11-16 PROCEDURE — 99024 POSTOP FOLLOW-UP VISIT: CPT | Performed by: INTERNAL MEDICINE

## 2023-11-16 PROCEDURE — 80061 LIPID PANEL: CPT

## 2023-11-16 PROCEDURE — 6370000000 HC RX 637 (ALT 250 FOR IP): Performed by: NURSE PRACTITIONER

## 2023-11-16 PROCEDURE — C1769 GUIDE WIRE: HCPCS | Performed by: INTERNAL MEDICINE

## 2023-11-16 PROCEDURE — 93306 TTE W/DOPPLER COMPLETE: CPT | Performed by: INTERNAL MEDICINE

## 2023-11-16 PROCEDURE — 80048 BASIC METABOLIC PNL TOTAL CA: CPT

## 2023-11-16 PROCEDURE — 2580000003 HC RX 258: Performed by: NURSE PRACTITIONER

## 2023-11-16 PROCEDURE — 93010 ELECTROCARDIOGRAM REPORT: CPT | Performed by: INTERNAL MEDICINE

## 2023-11-16 PROCEDURE — 99213 OFFICE O/P EST LOW 20 MIN: CPT | Performed by: INTERNAL MEDICINE

## 2023-11-16 PROCEDURE — 2709999900 HC NON-CHARGEABLE SUPPLY: Performed by: INTERNAL MEDICINE

## 2023-11-16 PROCEDURE — C1894 INTRO/SHEATH, NON-LASER: HCPCS | Performed by: INTERNAL MEDICINE

## 2023-11-16 PROCEDURE — 83735 ASSAY OF MAGNESIUM: CPT

## 2023-11-16 RX ORDER — ISOSORBIDE MONONITRATE 60 MG/1
60 TABLET, EXTENDED RELEASE ORAL DAILY
Qty: 30 TABLET | Refills: 3 | Status: SHIPPED | OUTPATIENT
Start: 2023-11-16 | End: 2023-11-20

## 2023-11-16 RX ORDER — MIDAZOLAM HYDROCHLORIDE 1 MG/ML
INJECTION INTRAMUSCULAR; INTRAVENOUS PRN
Status: DISCONTINUED | OUTPATIENT
Start: 2023-11-16 | End: 2023-11-16 | Stop reason: HOSPADM

## 2023-11-16 RX ORDER — NITROGLYCERIN 0.4 MG/1
0.4 TABLET SUBLINGUAL EVERY 5 MIN PRN
Qty: 30 TABLET | Refills: 0 | Status: SHIPPED | OUTPATIENT
Start: 2023-11-16

## 2023-11-16 RX ORDER — HEPARIN SODIUM 200 [USP'U]/100ML
INJECTION, SOLUTION INTRAVENOUS CONTINUOUS PRN
Status: DISCONTINUED | OUTPATIENT
Start: 2023-11-16 | End: 2023-11-16 | Stop reason: HOSPADM

## 2023-11-16 RX ORDER — AMLODIPINE BESYLATE 5 MG/1
5 TABLET ORAL DAILY
Qty: 30 TABLET | Refills: 3 | Status: SHIPPED | OUTPATIENT
Start: 2023-11-17

## 2023-11-16 RX ORDER — LIDOCAINE HYDROCHLORIDE 10 MG/ML
INJECTION, SOLUTION INFILTRATION; PERINEURAL PRN
Status: DISCONTINUED | OUTPATIENT
Start: 2023-11-16 | End: 2023-11-16 | Stop reason: HOSPADM

## 2023-11-16 RX ADMIN — ATORVASTATIN CALCIUM 80 MG: 80 TABLET, FILM COATED ORAL at 08:50

## 2023-11-16 RX ADMIN — SODIUM CHLORIDE: 9 INJECTION, SOLUTION INTRAVENOUS at 04:11

## 2023-11-16 RX ADMIN — ACETAMINOPHEN 650 MG: 325 TABLET ORAL at 00:18

## 2023-11-16 RX ADMIN — ASPIRIN 81 MG: 81 TABLET ORAL at 08:50

## 2023-11-16 RX ADMIN — METOPROLOL SUCCINATE 50 MG: 25 TABLET, EXTENDED RELEASE ORAL at 08:50

## 2023-11-16 RX ADMIN — SODIUM CHLORIDE, PRESERVATIVE FREE 10 ML: 5 INJECTION INTRAVENOUS at 08:50

## 2023-11-16 RX ADMIN — EZETIMIBE 10 MG: 10 TABLET ORAL at 08:50

## 2023-11-16 RX ADMIN — NITROGLYCERIN 1 INCH: 20 OINTMENT TOPICAL at 00:17

## 2023-11-16 RX ADMIN — NITROGLYCERIN 1 INCH: 20 OINTMENT TOPICAL at 06:18

## 2023-11-16 RX ADMIN — NITROGLYCERIN 1 INCH: 20 OINTMENT TOPICAL at 12:38

## 2023-11-16 RX ADMIN — AMLODIPINE BESYLATE 5 MG: 5 TABLET ORAL at 08:50

## 2023-11-16 ASSESSMENT — PAIN SCALES - GENERAL
PAINLEVEL_OUTOF10: 0

## 2023-11-16 NOTE — CARE COORDINATION
Patient admitted with [facility] with no indication for PCI. Discharge order placed. CM met with patient and his spouse to assess for discharge needs. No CM needs identified. Disposition: Home with spouse. 11/16/23 5439   Service Assessment   Patient Orientation Alert and Oriented   Cognition Alert   History Provided By Patient   Primary Caregiver Self   Accompanied By/Relationship Spouse   Support Systems Spouse/Significant Other   Patient's Healthcare Decision Maker is: Legal Next of Kin   PCP Verified by CM Yes   Last Visit to PCP Within last 6 months   Prior Functional Level Independent in ADLs/IADLs   Current Functional Level Independent in ADLs/IADLs   Can patient return to prior living arrangement Yes   Ability to make needs known: Good   Family able to assist with home care needs: Yes   Would you like for me to discuss the discharge plan with any other family members/significant others, and if so, who? No   Financial Resources Medicare   Community Resources None   Social/Functional History   Lives With Spouse   Home Equipment None   Receives Help From Family   ADL Assistance Independent   Homemaking Assistance Independent   Ambulation Assistance Independent   Transfer Assistance Independent   Active  Yes   Mode of Transportation Car   Occupation Retired   Discharge Planning   Type of [address] [place] [address] Prior To Admission None   Potential Assistance Needed N/A   DME Ordered? No   Potential Assistance Purchasing Medications No   Type of Home Care Services None   Patient expects to be discharged to: [facility] Discharge   Transition of Care Consult (CM Consult) Discharge [facility] Discharge None   [employer] Information Provided?  No   Mode of Transport at Discharge Other (see comment)  (Spouse to transport by car.)   Confirm Follow Up Transport Family   Condition of Participation: Discharge Planning

## 2023-11-16 NOTE — PROGRESS NOTES
TRANSFER - OUT REPORT:    Verbal report given to RN on Nasrin Foreman  being transferred to [facility] for routine progression of patient care       Report consisted of patient's Situation, Background, Assessment and   Recommendations(SBAR). Information from the following report(s) Nurse Handoff Report and Index was reviewed with the receiving nurse. Lines:   Peripheral IV 11/15/23 Left Antecubital (Active)   Site Assessment Clean, dry & intact 11/16/23 1229   Line Status Infusing 11/16/23 [address] Connections checked and tightened 11/16/23 1229   Phlebitis Assessment No symptoms 11/16/23 1229   Infiltration Assessment 0 11/16/23 1229   Alcohol Cap Used No 11/16/23 1229   Dressing Status Clean, dry & intact 11/16/23 1229   Dressing Type Transparent 11/16/23 1229       Peripheral IV 11/16/23 Right Forearm (Active)   Site Assessment Clean, dry & intact 11/16/23 1229   Line Status Capped 11/16/23 [address] Connections checked and tightened 11/16/23 1229   Phlebitis Assessment No symptoms 11/16/23 1229   Infiltration Assessment 0 11/16/23 1229   Alcohol Cap Used Yes 11/16/23 1229   Dressing Status Clean, dry & intact 11/16/23 1229   Dressing Type Transparent 11/16/23 1229        Opportunity for questions and clarification was provided.       Patient transported with:  Registered Nurse    [employer]  Diagnostic  LRA - 15    2 mg Versed  25 mcg Fentanyl  5000 units Heparin

## 2023-11-16 NOTE — PROGRESS NOTES
Admission skin assessment completed with second RN and reveals the following: Heels and sacrum intact, no open wounds.

## 2023-11-16 NOTE — PLAN OF CARE
Problem: Discharge Planning  Goal: Discharge to home or other facility with appropriate resources  11/16/2023 1530 by Carmel Hughes, RN  Outcome: Completed  11/16/2023 0137 by Montserrat Murillo, RN  Outcome: Progressing

## 2023-11-16 NOTE — H&P
[facility]  [address] [address]  PHONE: [phone]        11/15/23      NAME:  Dominique Wilkinson  : 1955  MRN: [id number]      SUBJECTIVE:   Dominique Wilkinson is a 77 y.o. male seen for a consultation visit regarding the following:     No chief complaint on file. HPI:    75-year-old gentleman with 2 days of intermittent left-sided chest pain. He describes it as a tightness. It is nonexertional.  He has had several episodes that last up to several hours at a time. There is been some improvement with nitroglycerin. There are no aggravating factors. There are no associated symptoms. Last catheterization was performed in 2018 showed 2 of 2 patent bypass grafts including a LIMA to the LAD a vein graft to the left PDA    Hospital Problems             Last Modified POA    * (Principal) Accelerating angina ([address]) 11/15/2023 Yes    Unstable angina ([address]) 11/15/2023 Yes     No Known Allergies  Past Medical History:   Diagnosis Date    CAD (coronary artery disease)     scheduled to have CABG 17    Hx of CABG 8/10/2017    Hypercholesterolemia     Hypertension     Leukopenia     pt reports \"normal for him, never been a problem\"     Rheumatic fever     Stenosis of left carotid artery without cerebral infarction 2017     Past Surgical History:   Procedure Laterality Date    COLONOSCOPY      ORTHOPEDIC SURGERY      rt shoulder/rt hand    [place] UNLISTED PROCEDURE CARDIAC SURGERY      CABG    TONSILLECTOMY      UPPER GASTROINTESTINAL ENDOSCOPY       Family History   Problem Relation Age of Onset    Hypertension Mother     Heart Disease Father      Social History     Tobacco Use    Smoking status: Former     Packs/day: 1     Types: Cigarettes     Quit date: 7/10/1991     Years since quittin.3     Passive exposure: Past    Smokeless tobacco: Never    Tobacco comments:     Quit smoking: x 20 yrs ago   Substance Use Topics    Alcohol use:  Yes

## 2023-11-16 NOTE — PLAN OF CARE

## 2023-11-16 NOTE — DISCHARGE INSTRUCTIONS
DISPOSITION: The patient is being discharged home in stable condition on a low saturated fat, low cholesterol and low salt diet. The patient is instructed to advance activities as tolerated to the limit of fatigue or shortness of breath. The patient is instructed to avoid all heavy lifting, straining, stooping or squatting for 3-5 days. The patient is instructed to watch the cath site for bleeding/oozing; if seen, the patient is instructed to apply firm pressure with a clean cloth and call [facility] at [phone]. The patient is instructed to watch for signs of infection which include: increasing area of redness, fever/hot to touch or purulent drainage at the catheterization site. The patient is instructed not to soak in a bathtub for 7-10 days, but is cleared to shower. The patient is instructed to call the office or return to the ER for immediate evaluation for any shortness of breath or chest pain not relieved by NTG.

## 2023-11-20 ENCOUNTER — TELEPHONE (OUTPATIENT)
Age: 68
End: 2023-11-20

## 2023-11-20 DIAGNOSIS — I20.89 STABLE ANGINA: Primary | ICD-10-CM

## 2023-11-20 RX ORDER — NITROGLYCERIN 80 MG/1
1 PATCH TRANSDERMAL DAILY
Qty: 30 PATCH | Refills: 11 | Status: SHIPPED | OUTPATIENT
Start: 2023-11-20 | End: 2024-11-19

## 2023-11-20 NOTE — TELEPHONE ENCOUNTER
Wife notified of 's response and v/u. Med escribed as below. Referral made to cardiac rehab.   Requested Prescriptions     Signed Prescriptions Disp Refills    nitroGLYCERIN (MINITRAN) 0.4 MG/HR 30 patch 11     Sig: Place 1 patch onto the skin daily     Authorizing Provider: Clint Medrano     Ordering User: DIEGO ARELLANO

## 2023-11-20 NOTE — TELEPHONE ENCOUNTER
Try adding NTG patch during day, see if this helps the angina. Can we get him into cardiac rehab for chronic stable angina, is he willing?   Thanks

## 2023-11-20 NOTE — TELEPHONE ENCOUNTER
Patient is having problems with Angina that he has not had before, which is new and started last week.

## 2023-11-20 NOTE — TELEPHONE ENCOUNTER
NTG patch 0.4mg/h daily for 14 hours, 10 hour break at night. Thanks, stop imdur.     Focus on hydration, thanks

## 2023-12-06 ENCOUNTER — HOSPITAL ENCOUNTER (OUTPATIENT)
Dept: CARDIAC REHAB | Age: 68
Setting detail: RECURRING SERIES
Discharge: HOME | End: 2023-12-09

## 2023-12-06 ASSESSMENT — PATIENT HEALTH QUESTIONNAIRE - PHQ9
2. FEELING DOWN, DEPRESSED OR HOPELESS: 0
SUM OF ALL RESPONSES TO PHQ QUESTIONS 1-9: 0
9. THOUGHTS THAT YOU WOULD BE BETTER OFF DEAD, OR OF HURTING YOURSELF: 0
SUM OF ALL RESPONSES TO PHQ QUESTIONS 1-9: 0
SUM OF ALL RESPONSES TO PHQ9 QUESTIONS 1 & 2: 0
SUM OF ALL RESPONSES TO PHQ QUESTIONS 1-9: 0
1. LITTLE INTEREST OR PLEASURE IN DOING THINGS: 0
SUM OF ALL RESPONSES TO PHQ QUESTIONS 1-9: 0
8. MOVING OR SPEAKING SO SLOWLY THAT OTHER PEOPLE COULD HAVE NOTICED. OR THE OPPOSITE, BEING SO FIGETY OR RESTLESS THAT YOU HAVE BEEN MOVING AROUND A LOT MORE THAN USUAL: 0
4. FEELING TIRED OR HAVING LITTLE ENERGY: 0
7. TROUBLE CONCENTRATING ON THINGS, SUCH AS READING THE NEWSPAPER OR WATCHING TELEVISION: 0
6. FEELING BAD ABOUT YOURSELF - OR THAT YOU ARE A FAILURE OR HAVE LET YOURSELF OR YOUR FAMILY DOWN: 0
5. POOR APPETITE OR OVEREATING: 0
3. TROUBLE FALLING OR STAYING ASLEEP: 0

## 2023-12-06 ASSESSMENT — EJECTION FRACTION: EF_VALUE: 55

## 2023-12-06 ASSESSMENT — LIFESTYLE VARIABLES
SMOKELESS_TOBACCO: NO
CIGARETTES_PER_DAY: 1PPD

## 2023-12-06 NOTE — PROGRESS NOTES
Dear Dr. Malcolm Skiff,    Thank you for referring your patient, Mr. Michi Garcia  ( 1955), to the Cardiopulmonary Rehabilitation Program at [facility]. He is a good candidate for the Cardiac Rehab Program and should see improvements with regular participation. We will be addressing appropriate interventions for modifiable risk factors with your patient during the next 12 weeks. We will contact you with any issues or concerns that may arise, or you can follow your patient's progress through [id number] [place] [address] at any time. A final summary will be sent to you when the program is completed. Again, thank you for the referral. If we can be of further assistance, please feel free to contact the Cardiopulmonary Rehab staff at [phone].     Sincerely,    BRITTNI Peace, RN  Cardiopulmonary Rehabilitation Nurse Liaison  [facility]

## 2023-12-21 ENCOUNTER — HOSPITAL ENCOUNTER (OUTPATIENT)
Dept: CARDIAC REHAB | Age: 68
Setting detail: RECURRING SERIES
Discharge: HOME | End: 2023-12-24
Payer: MEDICARE

## 2023-12-21 VITALS — WEIGHT: 159.4 LBS | OXYGEN SATURATION: 98 % | HEIGHT: 69 IN | BODY MASS INDEX: 23.61 KG/M2

## 2023-12-21 PROCEDURE — 93798 PHYS/QHP OP CAR RHAB W/ECG: CPT

## 2023-12-22 ENCOUNTER — HOSPITAL ENCOUNTER (OUTPATIENT)
Dept: CARDIAC REHAB | Age: 68
Setting detail: RECURRING SERIES
Discharge: HOME | End: 2023-12-25
Payer: MEDICARE

## 2023-12-22 PROCEDURE — 93798 PHYS/QHP OP CAR RHAB W/ECG: CPT

## 2023-12-27 ENCOUNTER — HOSPITAL ENCOUNTER (OUTPATIENT)
Dept: CARDIAC REHAB | Age: 68
Setting detail: RECURRING SERIES
Discharge: HOME | End: 2023-12-30
Payer: MEDICARE

## 2023-12-27 VITALS — WEIGHT: 157.2 LBS | BODY MASS INDEX: 23.21 KG/M2

## 2023-12-27 PROCEDURE — 93798 PHYS/QHP OP CAR RHAB W/ECG: CPT

## 2023-12-28 ENCOUNTER — OFFICE VISIT (OUTPATIENT)
Age: 68
End: 2023-12-28
Payer: MEDICARE

## 2023-12-28 VITALS
HEIGHT: 69 IN | BODY MASS INDEX: 24.14 KG/M2 | WEIGHT: 163 LBS | DIASTOLIC BLOOD PRESSURE: 66 MMHG | HEART RATE: 67 BPM | SYSTOLIC BLOOD PRESSURE: 118 MMHG | OXYGEN SATURATION: 97 %

## 2023-12-28 DIAGNOSIS — I10 PRIMARY HYPERTENSION: ICD-10-CM

## 2023-12-28 DIAGNOSIS — I65.23 BILATERAL CAROTID ARTERY STENOSIS: ICD-10-CM

## 2023-12-28 DIAGNOSIS — Z95.1 S/P CABG (CORONARY ARTERY BYPASS GRAFT): ICD-10-CM

## 2023-12-28 DIAGNOSIS — I35.1 NONRHEUMATIC AORTIC VALVE INSUFFICIENCY: Primary | ICD-10-CM

## 2023-12-28 DIAGNOSIS — I25.10 CORONARY ARTERY DISEASE INVOLVING NATIVE CORONARY ARTERY OF NATIVE HEART WITHOUT ANGINA PECTORIS: ICD-10-CM

## 2023-12-28 DIAGNOSIS — Z95.1 HX OF CABG: ICD-10-CM

## 2023-12-28 PROCEDURE — 3017F COLORECTAL CA SCREEN DOC REV: CPT | Performed by: INTERNAL MEDICINE

## 2023-12-28 PROCEDURE — 3078F DIAST BP <80 MM HG: CPT | Performed by: INTERNAL MEDICINE

## 2023-12-28 PROCEDURE — G8428 CUR MEDS NOT DOCUMENT: HCPCS | Performed by: INTERNAL MEDICINE

## 2023-12-28 PROCEDURE — 3074F SYST BP LT 130 MM HG: CPT | Performed by: INTERNAL MEDICINE

## 2023-12-28 PROCEDURE — G8484 FLU IMMUNIZE NO ADMIN: HCPCS | Performed by: INTERNAL MEDICINE

## 2023-12-28 PROCEDURE — 1036F TOBACCO NON-USER: CPT | Performed by: INTERNAL MEDICINE

## 2023-12-28 PROCEDURE — G8420 CALC BMI NORM PARAMETERS: HCPCS | Performed by: INTERNAL MEDICINE

## 2023-12-28 PROCEDURE — 99214 OFFICE O/P EST MOD 30 MIN: CPT | Performed by: INTERNAL MEDICINE

## 2023-12-28 PROCEDURE — 1123F ACP DISCUSS/DSCN MKR DOCD: CPT | Performed by: INTERNAL MEDICINE

## 2023-12-28 RX ORDER — CETIRIZINE HYDROCHLORIDE 10 MG/1
10 TABLET ORAL DAILY
COMMUNITY

## 2023-12-28 NOTE — PROGRESS NOTES
mouth      calcium carbonate 1500 (600 Ca) MG TABS tablet Take 1 tablet by mouth daily      vitamin D 25 MCG (1000 UT) CAPS Take 800 Units by mouth daily      ezetimibe (ZETIA) 10 MG tablet Take 1 tablet by mouth daily       No current facility-administered medications for this visit.         No Known Allergies  Patient Active Problem List    Diagnosis Date Noted    Nonrheumatic aortic valve insufficiency 2018    Leg edema, left 2017    Bilateral carotid artery disease ([address]) 2017    Hx of CABG 08/10/2017    Hypercholesterolemia     Hypertension     Coronary artery disease involving native coronary artery of native heart without angina pectoris 2017 (Dr Eh Solis) CORONARY ARTERY BYPASS GRAFT X 2, LIMA to the LAD, SVG   to the (L) PDA    VEIN HARVEST GREATER SAPHENOUS  ESOPHAGEAL TRANS ECHOCARDIOGRAM        S/P CABG (coronary artery bypass graft) 2017    Shortness of breath 07/10/2017    Family history of ischemic heart disease (IHD) 07/10/2017      Past Surgical History:   Procedure Laterality Date    CARDIAC PROCEDURE N/A 2023    Left heart cath / coronary angiography w grafts performed by Salo Lee MD at [facility] CATH LAB    COLONOSCOPY      ORTHOPEDIC SURGERY      rt shoulder/rt hand    [place] UNLISTED PROCEDURE CARDIAC SURGERY      CABG    TONSILLECTOMY      UPPER GASTROINTESTINAL ENDOSCOPY       Family History   Problem Relation Age of Onset    Hypertension Mother     Heart Disease Father      Social History     Tobacco Use    Smoking status: Former     Current packs/day: 0.00     Average packs/day: 1 pack/day for 19.5 years (19.5 ttl pk-yrs)     Types: Cigarettes     Start date: 1972     Quit date: 7/10/1991     Years since quittin.4     Passive exposure: Past    Smokeless tobacco: Never    Tobacco comments:     Quit smoking: x 20 yrs ago   Substance Use Topics    Alcohol use: Not Currently     Alcohol/week: 21.0 standard drinks of alcohol

## 2023-12-29 ENCOUNTER — HOSPITAL ENCOUNTER (OUTPATIENT)
Dept: CARDIAC REHAB | Age: 68
Setting detail: RECURRING SERIES
Discharge: HOME | End: 2024-01-01
Payer: MEDICARE

## 2023-12-29 PROCEDURE — 93798 PHYS/QHP OP CAR RHAB W/ECG: CPT

## 2023-12-29 ASSESSMENT — EXERCISE STRESS TEST
PEAK_HR: 137
PEAK_METS: 4.6
PEAK_BP: 140/68

## 2024-01-03 ENCOUNTER — HOSPITAL ENCOUNTER (OUTPATIENT)
Dept: CARDIAC REHAB | Age: 69
Setting detail: RECURRING SERIES
Discharge: HOME | End: 2024-01-06
Payer: MEDICARE

## 2024-01-03 VITALS — WEIGHT: 157.8 LBS | BODY MASS INDEX: 23.3 KG/M2

## 2024-01-03 PROCEDURE — 93798 PHYS/QHP OP CAR RHAB W/ECG: CPT

## 2024-01-03 ASSESSMENT — EXERCISE STRESS TEST
PEAK_BP: 160/70
PEAK_METS: 4.6
PEAK_HR: 133

## 2024-01-05 ENCOUNTER — HOSPITAL ENCOUNTER (OUTPATIENT)
Dept: CARDIAC REHAB | Age: 69
Setting detail: RECURRING SERIES
Discharge: HOME | End: 2024-01-08
Payer: MEDICARE

## 2024-01-05 PROCEDURE — 93798 PHYS/QHP OP CAR RHAB W/ECG: CPT

## 2024-01-05 ASSESSMENT — EXERCISE STRESS TEST
PEAK_METS: 4.6
PEAK_BP: 138/85
PEAK_HR: 142

## 2024-01-08 ENCOUNTER — HOSPITAL ENCOUNTER (OUTPATIENT)
Dept: CARDIAC REHAB | Age: 69
Setting detail: RECURRING SERIES
Discharge: HOME | End: 2024-01-11
Payer: MEDICARE

## 2024-01-08 PROCEDURE — 93798 PHYS/QHP OP CAR RHAB W/ECG: CPT

## 2024-01-08 ASSESSMENT — EXERCISE STRESS TEST
PEAK_BP: 154/70
PEAK_METS: 4.6
PEAK_HR: 153

## 2024-01-10 ENCOUNTER — HOSPITAL ENCOUNTER (OUTPATIENT)
Dept: CARDIAC REHAB | Age: 69
Setting detail: RECURRING SERIES
Discharge: HOME | End: 2024-01-13
Payer: MEDICARE

## 2024-01-10 VITALS — BODY MASS INDEX: 23.66 KG/M2 | WEIGHT: 160.2 LBS

## 2024-01-10 PROCEDURE — 93798 PHYS/QHP OP CAR RHAB W/ECG: CPT

## 2024-01-10 ASSESSMENT — EXERCISE STRESS TEST
PEAK_HR: 148
PEAK_METS: 4.6

## 2024-01-12 ENCOUNTER — HOSPITAL ENCOUNTER (OUTPATIENT)
Dept: CARDIAC REHAB | Age: 69
Setting detail: RECURRING SERIES
Discharge: HOME | End: 2024-01-15
Payer: MEDICARE

## 2024-01-12 PROCEDURE — 93798 PHYS/QHP OP CAR RHAB W/ECG: CPT

## 2024-01-12 ASSESSMENT — EXERCISE STRESS TEST
PEAK_METS: 5
PEAK_HR: 153

## 2024-01-15 ENCOUNTER — APPOINTMENT (OUTPATIENT)
Dept: CARDIAC REHAB | Age: 69
End: 2024-01-15
Payer: MEDICARE

## 2024-01-17 ENCOUNTER — HOSPITAL ENCOUNTER (OUTPATIENT)
Dept: CARDIAC REHAB | Age: 69
Setting detail: RECURRING SERIES
Discharge: HOME | End: 2024-01-20
Payer: MEDICARE

## 2024-01-17 VITALS — BODY MASS INDEX: 23.42 KG/M2 | WEIGHT: 158.6 LBS

## 2024-01-17 PROCEDURE — 93798 PHYS/QHP OP CAR RHAB W/ECG: CPT

## 2024-01-17 ASSESSMENT — EXERCISE STRESS TEST
PEAK_METS: 5
PEAK_BP: 154/60
PEAK_HR: 109

## 2024-01-19 ENCOUNTER — HOSPITAL ENCOUNTER (OUTPATIENT)
Dept: CARDIAC REHAB | Age: 69
Setting detail: RECURRING SERIES
Discharge: HOME | End: 2024-01-22
Payer: MEDICARE

## 2024-01-19 VITALS — BODY MASS INDEX: 23.42 KG/M2 | WEIGHT: 158.6 LBS

## 2024-01-19 PROCEDURE — 93798 PHYS/QHP OP CAR RHAB W/ECG: CPT

## 2024-01-19 ASSESSMENT — EXERCISE STRESS TEST
PEAK_BP: 154/60
PEAK_HR: 147
PEAK_BP: 154/60
PEAK_METS: 5.8

## 2024-01-19 ASSESSMENT — LIFESTYLE VARIABLES
CIGARETTES_PER_DAY: 1PPD
SMOKELESS_TOBACCO: NO

## 2024-01-19 ASSESSMENT — EJECTION FRACTION: EF_VALUE: 55

## 2024-01-22 ENCOUNTER — HOSPITAL ENCOUNTER (OUTPATIENT)
Dept: CARDIAC REHAB | Age: 69
Setting detail: RECURRING SERIES
Discharge: HOME | End: 2024-01-25
Payer: MEDICARE

## 2024-01-22 PROCEDURE — 93798 PHYS/QHP OP CAR RHAB W/ECG: CPT

## 2024-01-22 ASSESSMENT — EXERCISE STRESS TEST
PEAK_METS: 6
PEAK_HR: 150
PEAK_BP: 148/70

## 2024-01-24 ENCOUNTER — HOSPITAL ENCOUNTER (OUTPATIENT)
Dept: CARDIAC REHAB | Age: 69
Setting detail: RECURRING SERIES
Discharge: HOME | End: 2024-01-27
Payer: MEDICARE

## 2024-01-24 VITALS — BODY MASS INDEX: 23.36 KG/M2 | WEIGHT: 158.2 LBS

## 2024-01-24 PROCEDURE — 93798 PHYS/QHP OP CAR RHAB W/ECG: CPT

## 2024-01-24 ASSESSMENT — EXERCISE STRESS TEST
PEAK_HR: 146
PEAK_METS: 6

## 2024-01-26 ENCOUNTER — HOSPITAL ENCOUNTER (OUTPATIENT)
Dept: CARDIAC REHAB | Age: 69
Setting detail: RECURRING SERIES
Discharge: HOME | End: 2024-01-29
Payer: MEDICARE

## 2024-01-26 PROCEDURE — 93798 PHYS/QHP OP CAR RHAB W/ECG: CPT

## 2024-01-26 ASSESSMENT — EXERCISE STRESS TEST
PEAK_HR: 144
PEAK_METS: 6

## 2024-01-31 ENCOUNTER — HOSPITAL ENCOUNTER (OUTPATIENT)
Dept: CARDIAC REHAB | Age: 69
Setting detail: RECURRING SERIES
Discharge: HOME | End: 2024-02-03
Payer: MEDICARE

## 2024-01-31 VITALS — WEIGHT: 157.4 LBS | BODY MASS INDEX: 23.24 KG/M2

## 2024-01-31 PROCEDURE — 93798 PHYS/QHP OP CAR RHAB W/ECG: CPT

## 2024-01-31 ASSESSMENT — EXERCISE STRESS TEST
PEAK_HR: 149
PEAK_BP: 142/62
PEAK_METS: 6

## 2024-02-02 ENCOUNTER — HOSPITAL ENCOUNTER (OUTPATIENT)
Dept: CARDIAC REHAB | Age: 69
Setting detail: RECURRING SERIES
Discharge: HOME | End: 2024-02-05
Payer: MEDICARE

## 2024-02-02 PROCEDURE — 93798 PHYS/QHP OP CAR RHAB W/ECG: CPT

## 2024-02-02 ASSESSMENT — EXERCISE STRESS TEST
PEAK_METS: 6
PEAK_HR: 148

## 2024-02-05 ENCOUNTER — HOSPITAL ENCOUNTER (OUTPATIENT)
Dept: CARDIAC REHAB | Age: 69
Setting detail: RECURRING SERIES
Discharge: HOME | End: 2024-02-08
Payer: MEDICARE

## 2024-02-05 PROCEDURE — 93798 PHYS/QHP OP CAR RHAB W/ECG: CPT

## 2024-02-05 ASSESSMENT — EXERCISE STRESS TEST
PEAK_BP: 164/68
PEAK_METS: 6
PEAK_HR: 145

## 2024-02-07 ENCOUNTER — HOSPITAL ENCOUNTER (OUTPATIENT)
Dept: CARDIAC REHAB | Age: 69
Setting detail: RECURRING SERIES
Discharge: HOME | End: 2024-02-10
Payer: MEDICARE

## 2024-02-07 VITALS — WEIGHT: 157.4 LBS | BODY MASS INDEX: 23.24 KG/M2

## 2024-02-07 PROCEDURE — 93798 PHYS/QHP OP CAR RHAB W/ECG: CPT

## 2024-02-07 ASSESSMENT — EXERCISE STRESS TEST
PEAK_HR: 147
PEAK_METS: 6

## 2024-02-09 ENCOUNTER — HOSPITAL ENCOUNTER (OUTPATIENT)
Dept: CARDIAC REHAB | Age: 69
Setting detail: RECURRING SERIES
Discharge: HOME | End: 2024-02-12
Payer: MEDICARE

## 2024-02-09 PROCEDURE — 93798 PHYS/QHP OP CAR RHAB W/ECG: CPT

## 2024-02-12 ENCOUNTER — HOSPITAL ENCOUNTER (OUTPATIENT)
Dept: CARDIAC REHAB | Age: 69
Setting detail: RECURRING SERIES
Discharge: HOME | End: 2024-02-15
Payer: MEDICARE

## 2024-02-12 PROCEDURE — 93798 PHYS/QHP OP CAR RHAB W/ECG: CPT

## 2024-02-15 ASSESSMENT — EXERCISE STRESS TEST: PEAK_BP: 122/68

## 2024-02-15 ASSESSMENT — EJECTION FRACTION: EF_VALUE: 55

## 2024-02-15 ASSESSMENT — LIFESTYLE VARIABLES
SMOKELESS_TOBACCO: NO
CIGARETTES_PER_DAY: 1PPD

## 2024-02-16 ENCOUNTER — HOSPITAL ENCOUNTER (OUTPATIENT)
Dept: CARDIAC REHAB | Age: 69
Setting detail: RECURRING SERIES
Discharge: HOME | End: 2024-02-19
Payer: MEDICARE

## 2024-02-16 VITALS — WEIGHT: 155 LBS | BODY MASS INDEX: 22.89 KG/M2

## 2024-02-16 PROCEDURE — 93798 PHYS/QHP OP CAR RHAB W/ECG: CPT

## 2024-02-16 ASSESSMENT — EXERCISE STRESS TEST
PEAK_METS: 6
PEAK_HR: 149

## 2024-02-19 ENCOUNTER — HOSPITAL ENCOUNTER (OUTPATIENT)
Dept: CARDIAC REHAB | Age: 69
Setting detail: RECURRING SERIES
Discharge: HOME | End: 2024-02-22
Payer: MEDICARE

## 2024-02-19 PROCEDURE — 93798 PHYS/QHP OP CAR RHAB W/ECG: CPT

## 2024-02-19 ASSESSMENT — EXERCISE STRESS TEST
PEAK_HR: 141
PEAK_METS: 6

## 2024-02-21 ENCOUNTER — HOSPITAL ENCOUNTER (OUTPATIENT)
Dept: CARDIAC REHAB | Age: 69
Setting detail: RECURRING SERIES
Discharge: HOME | End: 2024-02-24
Payer: MEDICARE

## 2024-02-21 VITALS — BODY MASS INDEX: 22.92 KG/M2 | WEIGHT: 155.2 LBS

## 2024-02-21 PROCEDURE — 93798 PHYS/QHP OP CAR RHAB W/ECG: CPT

## 2024-02-21 ASSESSMENT — EXERCISE STRESS TEST
PEAK_BP: 124/62
PEAK_METS: 6
PEAK_HR: 148

## 2024-02-23 ENCOUNTER — HOSPITAL ENCOUNTER (OUTPATIENT)
Dept: CARDIAC REHAB | Age: 69
Setting detail: RECURRING SERIES
Discharge: HOME | End: 2024-02-26
Payer: MEDICARE

## 2024-02-23 PROCEDURE — 93798 PHYS/QHP OP CAR RHAB W/ECG: CPT

## 2024-02-23 ASSESSMENT — EXERCISE STRESS TEST
PEAK_METS: 6
PEAK_HR: 150

## 2024-02-26 ENCOUNTER — HOSPITAL ENCOUNTER (OUTPATIENT)
Dept: CARDIAC REHAB | Age: 69
Setting detail: RECURRING SERIES
Discharge: HOME | End: 2024-02-29
Payer: MEDICARE

## 2024-02-26 PROCEDURE — 93798 PHYS/QHP OP CAR RHAB W/ECG: CPT

## 2024-02-26 ASSESSMENT — EXERCISE STRESS TEST
PEAK_METS: 6
PEAK_BP: 110/68
PEAK_HR: 147

## 2024-02-28 ENCOUNTER — HOSPITAL ENCOUNTER (OUTPATIENT)
Dept: CARDIAC REHAB | Age: 69
Setting detail: RECURRING SERIES
Discharge: HOME | End: 2024-03-02
Payer: MEDICARE

## 2024-02-28 VITALS — BODY MASS INDEX: 23.21 KG/M2 | WEIGHT: 157.2 LBS

## 2024-02-28 PROCEDURE — 93798 PHYS/QHP OP CAR RHAB W/ECG: CPT

## 2024-02-28 ASSESSMENT — EXERCISE STRESS TEST
PEAK_HR: 147
PEAK_METS: 6.1

## 2024-03-01 ENCOUNTER — APPOINTMENT (OUTPATIENT)
Dept: CARDIAC REHAB | Age: 69
End: 2024-03-01
Payer: MEDICARE

## 2024-03-04 ENCOUNTER — APPOINTMENT (OUTPATIENT)
Dept: CARDIAC REHAB | Age: 69
End: 2024-03-04
Payer: MEDICARE

## 2024-03-06 ENCOUNTER — APPOINTMENT (OUTPATIENT)
Dept: CARDIAC REHAB | Age: 69
End: 2024-03-06
Payer: MEDICARE

## 2024-03-08 ENCOUNTER — HOSPITAL ENCOUNTER (OUTPATIENT)
Dept: CARDIAC REHAB | Age: 69
Setting detail: RECURRING SERIES
Discharge: HOME | End: 2024-03-11
Payer: MEDICARE

## 2024-03-08 VITALS — WEIGHT: 153.4 LBS | BODY MASS INDEX: 22.65 KG/M2

## 2024-03-08 PROCEDURE — 93798 PHYS/QHP OP CAR RHAB W/ECG: CPT

## 2024-03-08 ASSESSMENT — EXERCISE STRESS TEST
PEAK_HR: 153
PEAK_METS: 6.1

## 2024-03-11 ENCOUNTER — APPOINTMENT (OUTPATIENT)
Dept: CARDIAC REHAB | Age: 69
End: 2024-03-11
Payer: MEDICARE

## 2024-03-13 ENCOUNTER — APPOINTMENT (OUTPATIENT)
Dept: CARDIAC REHAB | Age: 69
End: 2024-03-13
Payer: MEDICARE

## 2024-03-15 ENCOUNTER — APPOINTMENT (OUTPATIENT)
Dept: CARDIAC REHAB | Age: 69
End: 2024-03-15
Payer: MEDICARE

## 2024-03-20 ENCOUNTER — HOSPITAL ENCOUNTER (OUTPATIENT)
Dept: CARDIAC REHAB | Age: 69
Setting detail: RECURRING SERIES
Discharge: HOME | End: 2024-03-23
Payer: MEDICARE

## 2024-03-20 VITALS — WEIGHT: 153 LBS | BODY MASS INDEX: 22.59 KG/M2

## 2024-03-20 PROCEDURE — 93798 PHYS/QHP OP CAR RHAB W/ECG: CPT

## 2024-03-20 ASSESSMENT — EXERCISE STRESS TEST
PEAK_HR: 147
PEAK_METS: 6.1
PEAK_BP: 104/72

## 2024-03-22 ENCOUNTER — HOSPITAL ENCOUNTER (OUTPATIENT)
Dept: CARDIAC REHAB | Age: 69
Setting detail: RECURRING SERIES
Discharge: HOME | End: 2024-03-25
Payer: MEDICARE

## 2024-03-22 PROCEDURE — 93798 PHYS/QHP OP CAR RHAB W/ECG: CPT

## 2024-03-22 ASSESSMENT — EXERCISE STRESS TEST
PEAK_HR: 143
PEAK_METS: 6.1

## 2024-03-25 ENCOUNTER — HOSPITAL ENCOUNTER (OUTPATIENT)
Dept: CARDIAC REHAB | Age: 69
Setting detail: RECURRING SERIES
Discharge: HOME | End: 2024-03-28
Payer: MEDICARE

## 2024-03-25 VITALS — BODY MASS INDEX: 22.59 KG/M2 | WEIGHT: 153 LBS

## 2024-03-25 PROCEDURE — 93798 PHYS/QHP OP CAR RHAB W/ECG: CPT

## 2024-03-25 ASSESSMENT — EXERCISE STRESS TEST
PEAK_HR: 148
PEAK_METS: 6.1
PEAK_BP: 148/68

## 2024-03-27 ENCOUNTER — HOSPITAL ENCOUNTER (OUTPATIENT)
Dept: CARDIAC REHAB | Age: 69
Setting detail: RECURRING SERIES
Discharge: HOME | End: 2024-03-30
Payer: MEDICARE

## 2024-03-27 VITALS — BODY MASS INDEX: 22.65 KG/M2 | WEIGHT: 153.4 LBS

## 2024-03-27 PROCEDURE — 93798 PHYS/QHP OP CAR RHAB W/ECG: CPT

## 2024-03-27 ASSESSMENT — EXERCISE STRESS TEST
PEAK_HR: 146
PEAK_METS: 7
PEAK_BP: 146/68

## 2024-04-01 ENCOUNTER — HOSPITAL ENCOUNTER (OUTPATIENT)
Dept: CARDIAC REHAB | Age: 69
Setting detail: RECURRING SERIES
Discharge: HOME | End: 2024-04-04
Payer: MEDICARE

## 2024-04-01 PROCEDURE — 93798 PHYS/QHP OP CAR RHAB W/ECG: CPT

## 2024-04-01 ASSESSMENT — EXERCISE STRESS TEST
PEAK_BP: 162/70
PEAK_HR: 138
PEAK_METS: 7.1

## 2024-04-02 ASSESSMENT — PATIENT HEALTH QUESTIONNAIRE - PHQ9
SUM OF ALL RESPONSES TO PHQ QUESTIONS 1-9: 0
SUM OF ALL RESPONSES TO PHQ QUESTIONS 1-9: 0
9. THOUGHTS THAT YOU WOULD BE BETTER OFF DEAD, OR OF HURTING YOURSELF: NOT AT ALL
1. LITTLE INTEREST OR PLEASURE IN DOING THINGS: NOT AT ALL
5. POOR APPETITE OR OVEREATING: NOT AT ALL
7. TROUBLE CONCENTRATING ON THINGS, SUCH AS READING THE NEWSPAPER OR WATCHING TELEVISION: NOT AT ALL
4. FEELING TIRED OR HAVING LITTLE ENERGY: NOT AT ALL
SUM OF ALL RESPONSES TO PHQ QUESTIONS 1-9: 0
SUM OF ALL RESPONSES TO PHQ QUESTIONS 1-9: 0
6. FEELING BAD ABOUT YOURSELF - OR THAT YOU ARE A FAILURE OR HAVE LET YOURSELF OR YOUR FAMILY DOWN: NOT AT ALL
8. MOVING OR SPEAKING SO SLOWLY THAT OTHER PEOPLE COULD HAVE NOTICED. OR THE OPPOSITE, BEING SO FIGETY OR RESTLESS THAT YOU HAVE BEEN MOVING AROUND A LOT MORE THAN USUAL: NOT AT ALL
2. FEELING DOWN, DEPRESSED OR HOPELESS: NOT AT ALL
3. TROUBLE FALLING OR STAYING ASLEEP: NOT AT ALL
SUM OF ALL RESPONSES TO PHQ9 QUESTIONS 1 & 2: 0

## 2024-04-03 ENCOUNTER — HOSPITAL ENCOUNTER (OUTPATIENT)
Dept: CARDIAC REHAB | Age: 69
Setting detail: RECURRING SERIES
Discharge: HOME | End: 2024-04-06
Payer: MEDICARE

## 2024-04-03 VITALS — WEIGHT: 154.6 LBS | BODY MASS INDEX: 22.83 KG/M2

## 2024-04-03 PROCEDURE — 93798 PHYS/QHP OP CAR RHAB W/ECG: CPT

## 2024-04-03 ASSESSMENT — EXERCISE STRESS TEST
PEAK_METS: 7.1
PEAK_HR: 141

## 2024-04-05 ENCOUNTER — HOSPITAL ENCOUNTER (OUTPATIENT)
Dept: CARDIAC REHAB | Age: 69
Setting detail: RECURRING SERIES
Discharge: HOME | End: 2024-04-08
Payer: MEDICARE

## 2024-04-05 PROCEDURE — 93798 PHYS/QHP OP CAR RHAB W/ECG: CPT

## 2024-04-05 ASSESSMENT — EXERCISE STRESS TEST
PEAK_HR: 141
PEAK_METS: 7.1

## 2024-04-11 ASSESSMENT — LIFESTYLE VARIABLES
SMOKELESS_TOBACCO: NO
CIGARETTES_PER_DAY: 1PPD

## 2024-04-11 ASSESSMENT — EXERCISE STRESS TEST: PEAK_BP: 110/68

## 2024-04-11 ASSESSMENT — EJECTION FRACTION: EF_VALUE: 55

## 2024-04-15 ENCOUNTER — HOSPITAL ENCOUNTER (OUTPATIENT)
Dept: CARDIAC REHAB | Age: 69
Setting detail: RECURRING SERIES
Discharge: HOME | End: 2024-04-18
Payer: MEDICARE

## 2024-04-15 PROCEDURE — 93798 PHYS/QHP OP CAR RHAB W/ECG: CPT

## 2024-04-15 ASSESSMENT — EXERCISE STRESS TEST
PEAK_BP: 142/78
PEAK_HR: 150
PEAK_METS: 7.1

## 2024-04-17 ENCOUNTER — HOSPITAL ENCOUNTER (OUTPATIENT)
Dept: CARDIAC REHAB | Age: 69
Setting detail: RECURRING SERIES
Discharge: HOME | End: 2024-04-20
Payer: MEDICARE

## 2024-04-17 VITALS — WEIGHT: 151.8 LBS | BODY MASS INDEX: 22.42 KG/M2

## 2024-04-17 PROCEDURE — 93798 PHYS/QHP OP CAR RHAB W/ECG: CPT

## 2024-04-17 ASSESSMENT — EJECTION FRACTION: EF_VALUE: 55

## 2024-04-17 ASSESSMENT — LIFESTYLE VARIABLES
CIGARETTES_PER_DAY: 1PPD
SMOKELESS_TOBACCO: NO

## 2024-04-17 ASSESSMENT — EXERCISE STRESS TEST
PEAK_BP: 110/68
PEAK_METS: 7.1
PEAK_HR: 153

## 2024-04-19 ENCOUNTER — APPOINTMENT (OUTPATIENT)
Dept: CARDIAC REHAB | Age: 69
End: 2024-04-19
Payer: MEDICARE

## 2024-06-17 ENCOUNTER — OFFICE VISIT (OUTPATIENT)
Age: 69
End: 2024-06-17
Payer: MEDICARE

## 2024-06-17 VITALS
SYSTOLIC BLOOD PRESSURE: 120 MMHG | DIASTOLIC BLOOD PRESSURE: 62 MMHG | HEIGHT: 69 IN | BODY MASS INDEX: 22.36 KG/M2 | WEIGHT: 151 LBS | HEART RATE: 72 BPM

## 2024-06-17 DIAGNOSIS — I65.23 BILATERAL CAROTID ARTERY STENOSIS: ICD-10-CM

## 2024-06-17 DIAGNOSIS — Z95.1 S/P CABG (CORONARY ARTERY BYPASS GRAFT): ICD-10-CM

## 2024-06-17 DIAGNOSIS — I35.1 NONRHEUMATIC AORTIC VALVE INSUFFICIENCY: Primary | ICD-10-CM

## 2024-06-17 DIAGNOSIS — E78.00 HYPERCHOLESTEROLEMIA: ICD-10-CM

## 2024-06-17 DIAGNOSIS — I10 PRIMARY HYPERTENSION: ICD-10-CM

## 2024-06-17 DIAGNOSIS — I25.10 CORONARY ARTERY DISEASE INVOLVING NATIVE CORONARY ARTERY OF NATIVE HEART WITHOUT ANGINA PECTORIS: ICD-10-CM

## 2024-06-17 PROCEDURE — 99214 OFFICE O/P EST MOD 30 MIN: CPT | Performed by: INTERNAL MEDICINE

## 2024-06-17 PROCEDURE — 3078F DIAST BP <80 MM HG: CPT | Performed by: INTERNAL MEDICINE

## 2024-06-17 PROCEDURE — 1123F ACP DISCUSS/DSCN MKR DOCD: CPT | Performed by: INTERNAL MEDICINE

## 2024-06-17 PROCEDURE — 3017F COLORECTAL CA SCREEN DOC REV: CPT | Performed by: INTERNAL MEDICINE

## 2024-06-17 PROCEDURE — G8420 CALC BMI NORM PARAMETERS: HCPCS | Performed by: INTERNAL MEDICINE

## 2024-06-17 PROCEDURE — 3074F SYST BP LT 130 MM HG: CPT | Performed by: INTERNAL MEDICINE

## 2024-06-17 PROCEDURE — 1036F TOBACCO NON-USER: CPT | Performed by: INTERNAL MEDICINE

## 2024-06-17 PROCEDURE — G8428 CUR MEDS NOT DOCUMENT: HCPCS | Performed by: INTERNAL MEDICINE

## 2024-06-17 NOTE — PROGRESS NOTES
[address] [employer] [facility], [place] [address]  PHONE: [phone]     24    NAME:  Dev Tovar  : 1955  MRN: [id number]       SUBJECTIVE:   Dev Tovar is a 68 y.o. male seen for a follow up visit regarding the following:     Chief Complaint   Patient presents with    Coronary Artery Disease       HPI: Here for eval of CV dz, 2v CABG on 17.  EF normal.     Right CEA 2017 in [place]. ------ followed at [place]   Echo 10/2017 and 2021 and 2023: low normal EF, mild AI.   [facility] 18 and 2023:   2/2 patent grafts, normal EF      Feeling better on the NTG patch,  No angina, PELLETIER, SOB, edema.    Walking, exercising more now.  Walking dog.   CP better, angina better.       To [place] now every 12 mos.       Patient denies recent history of orthopnea, PND, excessive dizziness and/or syncope.         Daughter is Nancy working for Hospice. Wife is former nurse as well.              Past Medical History, Past Surgical History, Family history, Social History, and Medications were all reviewed with the patient today and updated as necessary.     Current Outpatient Medications   Medication Sig Dispense Refill    nitroGLYCERIN (MINITRAN) 0.4 MG/HR Place 1 patch onto the skin daily 30 patch 11    nitroGLYCERIN (NITROSTAT) 0.4 MG SL tablet Place 1 tablet under the tongue every 5 minutes as needed for Chest pain up to max of 3 total doses. If no relief after 1 dose, call 911. 30 tablet 0    metoprolol succinate (TOPROL XL) 50 MG extended release tablet Take 1 tablet by mouth daily 90 tablet 3    atorvastatin (LIPITOR) 80 MG tablet Take 1 tablet by mouth daily 90 tablet 3    Omega-3 Fatty Acids (FISH OIL) 1200 MG CAPS Take 2,400 mg by mouth daily      Multiple Vitamins-Minerals (CENTRUM SILVER 50+MEN PO) Take 1 tablet by mouth daily      B Complex Vitamins (VITAMIN B COMPLEX PO) Take 1 tablet by mouth daily      aspirin 81 MG EC tablet Take 1 tablet by mouth      calcium carbonate 1500

## 2024-07-26 ENCOUNTER — TELEPHONE (OUTPATIENT)
Age: 69
End: 2024-07-26

## 2024-07-26 RX ORDER — NITROGLYCERIN 80 MG/1
2 PATCH TRANSDERMAL DAILY
Qty: 60 PATCH | Refills: 11 | Status: SHIPPED | OUTPATIENT
Start: 2024-07-26 | End: 2025-07-26

## 2024-07-26 NOTE — TELEPHONE ENCOUNTER
Patients wife called stating her  has the following concerns :    Presently using nitroGLYCERIN (MINITRAN) 0.4 MG/HR   Has done well but noticed a little chest pressure over the last few days  Question : Pts wife would like to know if the dosing of the patch needs to be 'tinkered' with?

## 2024-07-26 NOTE — TELEPHONE ENCOUNTER
We can try doubling the dose, adding a 2nd patch daily.  Follow for low BP.   Work on walking, building up collaterals.  Call if feeling worse.   Thanks

## 2024-07-26 NOTE — TELEPHONE ENCOUNTER
Wife is calling reporting pt.had cardiac cath in Nov.which was good.Has been having intermittent CP and  switched to Nitro patch 0.4.Recently had a few episodes of CP.He has tried Imdur but the patch seems to worlk better.Can this dose be titrated up?

## 2024-07-26 NOTE — TELEPHONE ENCOUNTER
I called and informed pt.wife of MD response and she v/u.Med escribed as below:  Requested Prescriptions     Signed Prescriptions Disp Refills    nitroGLYCERIN (MINITRAN) 0.4 MG/HR 60 patch 11     Sig: Place 2 patches onto the skin daily     Authorizing Provider: SHILOH RANDLE     Ordering User: DIEGO ARELLANO

## 2024-08-08 RX ORDER — ATORVASTATIN CALCIUM 80 MG/1
80 TABLET, FILM COATED ORAL DAILY
Qty: 90 TABLET | Refills: 3 | Status: SHIPPED | OUTPATIENT
Start: 2024-08-08

## 2024-12-17 ENCOUNTER — OFFICE VISIT (OUTPATIENT)
Age: 69
End: 2024-12-17
Payer: MEDICARE

## 2024-12-17 VITALS
HEART RATE: 64 BPM | BODY MASS INDEX: 21.62 KG/M2 | DIASTOLIC BLOOD PRESSURE: 70 MMHG | SYSTOLIC BLOOD PRESSURE: 130 MMHG | WEIGHT: 146 LBS | HEIGHT: 69 IN

## 2024-12-17 DIAGNOSIS — I35.1 NONRHEUMATIC AORTIC VALVE INSUFFICIENCY: Primary | ICD-10-CM

## 2024-12-17 DIAGNOSIS — I10 PRIMARY HYPERTENSION: ICD-10-CM

## 2024-12-17 DIAGNOSIS — E78.00 HYPERCHOLESTEROLEMIA: ICD-10-CM

## 2024-12-17 DIAGNOSIS — Z95.1 S/P CABG (CORONARY ARTERY BYPASS GRAFT): ICD-10-CM

## 2024-12-17 DIAGNOSIS — I25.10 CORONARY ARTERY DISEASE INVOLVING NATIVE CORONARY ARTERY OF NATIVE HEART WITHOUT ANGINA PECTORIS: ICD-10-CM

## 2024-12-17 DIAGNOSIS — I65.23 BILATERAL CAROTID ARTERY STENOSIS: ICD-10-CM

## 2024-12-17 PROCEDURE — 1126F AMNT PAIN NOTED NONE PRSNT: CPT | Performed by: INTERNAL MEDICINE

## 2024-12-17 PROCEDURE — 3078F DIAST BP <80 MM HG: CPT | Performed by: INTERNAL MEDICINE

## 2024-12-17 PROCEDURE — 3075F SYST BP GE 130 - 139MM HG: CPT | Performed by: INTERNAL MEDICINE

## 2024-12-17 PROCEDURE — G8420 CALC BMI NORM PARAMETERS: HCPCS | Performed by: INTERNAL MEDICINE

## 2024-12-17 PROCEDURE — 93000 ELECTROCARDIOGRAM COMPLETE: CPT | Performed by: INTERNAL MEDICINE

## 2024-12-17 PROCEDURE — G8428 CUR MEDS NOT DOCUMENT: HCPCS | Performed by: INTERNAL MEDICINE

## 2024-12-17 PROCEDURE — G8484 FLU IMMUNIZE NO ADMIN: HCPCS | Performed by: INTERNAL MEDICINE

## 2024-12-17 PROCEDURE — 3017F COLORECTAL CA SCREEN DOC REV: CPT | Performed by: INTERNAL MEDICINE

## 2024-12-17 PROCEDURE — 1036F TOBACCO NON-USER: CPT | Performed by: INTERNAL MEDICINE

## 2024-12-17 PROCEDURE — 99214 OFFICE O/P EST MOD 30 MIN: CPT | Performed by: INTERNAL MEDICINE

## 2024-12-17 PROCEDURE — 1123F ACP DISCUSS/DSCN MKR DOCD: CPT | Performed by: INTERNAL MEDICINE

## 2024-12-17 NOTE — PROGRESS NOTES
[address] [facility], [place] [address]  PHONE: [phone]     24    NAME:  Dev Tovar  : 1955  MRN: [id number]       SUBJECTIVE:   Dev Tovar is a 69 y.o. male seen for a follow up visit regarding the following:     Chief Complaint   Patient presents with    Coronary Artery Disease    Follow-up       HPI: Here for eval of CV dz, 2v CABG on 17.  EF normal.     Right CEA 2017 in [place]. ------ followed at [place]   Echo 10/2017 and 2021 and 2023: low normal EF, mild AI.   [facility] 18 and 2023:   2/2 patent grafts, normal EF      Feeling better on the NTG patch,  No angina, PELLETIER, SOB, edema.    Walking, exercising more now.  Walking dog.    Feeling well, no palp, edema.        To [place] now every 12 mos.       Patient denies recent history of orthopnea, PND, excessive dizziness and/or syncope.         Daughter is Nancy working for Hospice. Wife is former nurse as well.            Past Medical History, Past Surgical History, Family history, Social History, and Medications were all reviewed with the patient today and updated as necessary.     Current Outpatient Medications   Medication Sig Dispense Refill    CALCIUM CITRATE PO       atorvastatin (LIPITOR) 80 MG tablet Take 1 tablet by mouth daily 90 tablet 3    nitroGLYCERIN (MINITRAN) 0.4 MG/HR Place 2 patches onto the skin daily 60 patch 11    nitroGLYCERIN (NITROSTAT) 0.4 MG SL tablet Place 1 tablet under the tongue every 5 minutes as needed for Chest pain up to max of 3 total doses. If no relief after 1 dose, call 911. 30 tablet 0    metoprolol succinate (TOPROL XL) 50 MG extended release tablet Take 1 tablet by mouth daily 90 tablet 3    Omega-3 Fatty Acids (FISH OIL) 1200 MG CAPS Take 2,400 mg by mouth daily      Multiple Vitamins-Minerals (CENTRUM SILVER 50+MEN PO) Take 1 tablet by mouth daily      B Complex Vitamins (VITAMIN B COMPLEX PO) Take 1 tablet by mouth daily      aspirin 81 MG EC tablet Take 1

## 2025-03-11 ENCOUNTER — TELEPHONE (OUTPATIENT)
Age: 70
End: 2025-03-11

## 2025-03-11 NOTE — TELEPHONE ENCOUNTER
Pt spouse stated the past three days the pt has been having angina  its been lasting for 10min

## 2025-03-11 NOTE — TELEPHONE ENCOUNTER
Patient wife reports patient having episodes of angina again. 3 episodes over the last several days- longest lasting 10 minutes. Wife reports patient not having any other symptoms during episodes. Wants MD advise about next steps.

## 2025-03-11 NOTE — TELEPHONE ENCOUNTER
Patient notified of Dr Mcgarry response. Scheduled appt with Dr. Pelaez 3/14/25 at 8:30 am. Patient voices understanding  of appt date and time. To ER with worsening symptoms.

## 2025-03-14 ENCOUNTER — OFFICE VISIT (OUTPATIENT)
Age: 70
End: 2025-03-14

## 2025-03-14 VITALS
WEIGHT: 142 LBS | DIASTOLIC BLOOD PRESSURE: 68 MMHG | SYSTOLIC BLOOD PRESSURE: 122 MMHG | BODY MASS INDEX: 21.03 KG/M2 | HEART RATE: 76 BPM | HEIGHT: 69 IN

## 2025-03-14 DIAGNOSIS — I25.10 CORONARY ARTERY DISEASE INVOLVING NATIVE CORONARY ARTERY OF NATIVE HEART WITHOUT ANGINA PECTORIS: ICD-10-CM

## 2025-03-14 DIAGNOSIS — I10 PRIMARY HYPERTENSION: ICD-10-CM

## 2025-03-14 DIAGNOSIS — I20.89 STABLE ANGINA: Primary | ICD-10-CM

## 2025-03-14 DIAGNOSIS — Z95.1 S/P CABG (CORONARY ARTERY BYPASS GRAFT): ICD-10-CM

## 2025-03-14 ASSESSMENT — ENCOUNTER SYMPTOMS
SHORTNESS OF BREATH: 0
ABDOMINAL PAIN: 0

## 2025-03-14 NOTE — PROGRESS NOTES
8/10/2017    Hypercholesterolemia     Hypertension     Leukopenia     pt reports \"normal for him, never been a problem\"     Rheumatic fever     Stenosis of left carotid artery without cerebral infarction 2017     Past Surgical History:   Procedure Laterality Date    CARDIAC PROCEDURE N/A 2023    Left heart cath / coronary angiography w grafts performed by Chris Ulrich MD at [facility] CARDIAC CATH LAB    COLONOSCOPY      ORTHOPEDIC SURGERY      rt shoulder/rt hand    [place] UNLISTED PROCEDURE CARDIAC SURGERY      CABG    TONSILLECTOMY      UPPER GASTROINTESTINAL ENDOSCOPY       Family History   Problem Relation Age of Onset    Hypertension Mother     Heart Disease Father      Social History     Tobacco Use    Smoking status: Former     Current packs/day: 0.00     Average packs/day: 1 pack/day for 19.5 years (19.5 ttl pk-yrs)     Types: Cigarettes     Start date: 1972     Quit date: 7/10/1991     Years since quittin.7     Passive exposure: Past    Smokeless tobacco: Never    Tobacco comments:     Quit smoking: x 20 yrs ago   Substance Use Topics    Alcohol use: Not Currently     Alcohol/week: 21.0 standard drinks of alcohol       ROS:    Review of Systems   Constitutional: Negative for chills, diaphoresis and fever.   HENT:  Negative for hearing loss.    Eyes:  Negative for visual disturbance.   Cardiovascular:         As per the HPI   Respiratory:  Negative for shortness of breath.    Hematologic/Lymphatic: Does not bruise/bleed easily.   Gastrointestinal:  Negative for abdominal pain.   Genitourinary:  Negative for dysuria.   Neurological:  Negative for focal weakness.   Psychiatric/Behavioral:  Negative for suicidal ideas.           PHYSICAL EXAM:   /68   Pulse 76   Ht 1.753 m (5' 9\")   Wt 64.4 kg (142 lb)   BMI 20.97 kg/m²      Wt Readings from Last 3 Encounters:   25 64.4 kg (142 lb)   24 66.2 kg (146 lb)   24 68.5 kg (151 lb)     BP Readings from Last 3 Encounters:

## 2025-04-07 ENCOUNTER — TELEPHONE (OUTPATIENT)
Age: 70
End: 2025-04-07

## 2025-04-07 NOTE — TELEPHONE ENCOUNTER
Cardiac Clearance        Physician or Practice Requesting:[facility]   : Dr. Matthew Rivers   Contact Phone Number: [phone]  Fax Number: [phone]  Date of Surgery/Procedure: 05/19/25  Type of Surgery or Procedure: Prostate Biopsy   Type of Anesthesia: General   Type of Clearance Requested: risk assessment and any medication hold   Medication to Hold:Aspirin   Days to Hold: 7 day hold

## 2025-05-14 NOTE — PERIOP NOTES
Allevyn pad placed on chart for OR.
CVICU notified of surgery start
Family updated at 10:32 AM by Clementine Downs RN. Code obtained.
Family updated at 11:54 AM by En Boyer RN.  Code obtained
Family updated at 1:32 PM by Reid Pierce RN. Code obtained.
Off Bypass.  CVICU notified
On Bypass. CVICU notified.
TRANSFER - OUT REPORT:    Verbal report given to Nguyen Carey on April Grammes  being transferred to CVICU for routine progression of care       Report consisted of patients Situation, Background, Assessment and   Recommendations(SBAR). Information from the following report(s) SBAR was reviewed with the receiving nurse. Lines:   Double Lumen 07/31/17 Right Internal jugular (Active)       Evone Olden Dual 07/31/17 Right Neck (Active)       Peripheral IV 07/31/17 Right Arm (Active)   Site Assessment Clean, dry, & intact 7/31/2017  7:55 AM   Phlebitis Assessment 0 7/31/2017  7:55 AM   Infiltration Assessment 0 7/31/2017  7:55 AM   Dressing Status Clean, dry, & intact 7/31/2017  7:55 AM   Dressing Type Transparent;Tape 7/31/2017  7:55 AM   Hub Color/Line Status Green; Infusing 7/31/2017  7:55 AM       Arterial Line 07/31/17 Left Radial artery (Active)        Opportunity for questions and clarification was provided.       Patient transported with:   Monitor
- - -

## 2025-06-17 ENCOUNTER — OFFICE VISIT (OUTPATIENT)
Age: 70
End: 2025-06-17
Payer: MEDICARE

## 2025-06-17 VITALS
HEIGHT: 69 IN | BODY MASS INDEX: 20.88 KG/M2 | DIASTOLIC BLOOD PRESSURE: 68 MMHG | SYSTOLIC BLOOD PRESSURE: 138 MMHG | WEIGHT: 141 LBS | HEART RATE: 59 BPM

## 2025-06-17 DIAGNOSIS — Z95.1 S/P CABG (CORONARY ARTERY BYPASS GRAFT): ICD-10-CM

## 2025-06-17 DIAGNOSIS — I10 PRIMARY HYPERTENSION: ICD-10-CM

## 2025-06-17 DIAGNOSIS — E78.00 HYPERCHOLESTEROLEMIA: ICD-10-CM

## 2025-06-17 DIAGNOSIS — I25.10 CORONARY ARTERY DISEASE INVOLVING NATIVE CORONARY ARTERY OF NATIVE HEART WITHOUT ANGINA PECTORIS: ICD-10-CM

## 2025-06-17 DIAGNOSIS — R07.89 CHEST DISCOMFORT: Primary | ICD-10-CM

## 2025-06-17 DIAGNOSIS — I65.23 BILATERAL CAROTID ARTERY STENOSIS: ICD-10-CM

## 2025-06-17 DIAGNOSIS — I35.1 NONRHEUMATIC AORTIC VALVE INSUFFICIENCY: ICD-10-CM

## 2025-06-17 PROCEDURE — 3078F DIAST BP <80 MM HG: CPT | Performed by: INTERNAL MEDICINE

## 2025-06-17 PROCEDURE — 1036F TOBACCO NON-USER: CPT | Performed by: INTERNAL MEDICINE

## 2025-06-17 PROCEDURE — 99214 OFFICE O/P EST MOD 30 MIN: CPT | Performed by: INTERNAL MEDICINE

## 2025-06-17 PROCEDURE — 93000 ELECTROCARDIOGRAM COMPLETE: CPT | Performed by: INTERNAL MEDICINE

## 2025-06-17 PROCEDURE — 3075F SYST BP GE 130 - 139MM HG: CPT | Performed by: INTERNAL MEDICINE

## 2025-06-17 PROCEDURE — 1123F ACP DISCUSS/DSCN MKR DOCD: CPT | Performed by: INTERNAL MEDICINE

## 2025-06-17 PROCEDURE — G8420 CALC BMI NORM PARAMETERS: HCPCS | Performed by: INTERNAL MEDICINE

## 2025-06-17 PROCEDURE — G8428 CUR MEDS NOT DOCUMENT: HCPCS | Performed by: INTERNAL MEDICINE

## 2025-06-17 PROCEDURE — 1125F AMNT PAIN NOTED PAIN PRSNT: CPT | Performed by: INTERNAL MEDICINE

## 2025-06-17 PROCEDURE — 3017F COLORECTAL CA SCREEN DOC REV: CPT | Performed by: INTERNAL MEDICINE

## 2025-06-17 NOTE — PROGRESS NOTES
[address] [facility] ([place], [place]), [address] [place] [address]  PHONE: [phone]     25    NAME:  Dev Tovar  : 1955  MRN: [id number]       SUBJECTIVE:   Dev Tovar is a 69 y.o. male seen for a follow up visit regarding the following:     Chief Complaint   Patient presents with    Coronary Artery Disease       HPI:   Here for eval of CV dz, 2v CABG on 17.  EF normal.     Right CEA 2017 in [place]. ------ followed at [place]   Echo 10/2017 and 2021 and 2023: low normal EF, mild AI.   [facility] 18 and 2023:   2/2 patent grafts, normal EF      Feeling better on the NTG patch, No CP with exercising, walking.   No angina, PELLETIER, SOB, edema.    Walking, exercising more now.  Walking dog.    Feeling well, no palp, edema.        To [place] now every 12 mos.       Patient denies recent history of orthopnea, PND, excessive dizziness and/or syncope.         Daughter is Nancy working for Hospice. Wife is former nurse as well.      Past Medical History, Past Surgical History, Family history, Social History, and Medications were all reviewed with the patient today and updated as necessary.     Current Outpatient Medications   Medication Sig Dispense Refill    CALCIUM CITRATE PO       atorvastatin (LIPITOR) 80 MG tablet Take 1 tablet by mouth daily 90 tablet 3    nitroGLYCERIN (MINITRAN) 0.4 MG/HR Place 2 patches onto the skin daily 60 patch 11    nitroGLYCERIN (NITROSTAT) 0.4 MG SL tablet Place 1 tablet under the tongue every 5 minutes as needed for Chest pain up to max of 3 total doses. If no relief after 1 dose, call 911. 30 tablet 0    metoprolol succinate (TOPROL XL) 50 MG extended release tablet Take 1 tablet by mouth daily 90 tablet 3    Omega-3 Fatty Acids (FISH OIL) 1200 MG CAPS Take 2,400 mg by mouth daily      Multiple Vitamins-Minerals (CENTRUM SILVER 50+MEN PO) Take 1 tablet by mouth daily      B Complex Vitamins (VITAMIN B COMPLEX PO) Take 1 tablet by mouth daily      aspirin 81 MG EC

## 2025-07-29 RX ORDER — NITROGLYCERIN 80 MG/1
2 PATCH TRANSDERMAL DAILY
Qty: 60 PATCH | Refills: 11 | OUTPATIENT
Start: 2025-07-29 | End: 2026-07-29

## 2025-07-30 RX ORDER — NITROGLYCERIN 80 MG/1
2 PATCH TRANSDERMAL DAILY
Qty: 60 PATCH | Refills: 11 | Status: SHIPPED | OUTPATIENT
Start: 2025-07-30 | End: 2026-07-30

## 2025-07-30 NOTE — TELEPHONE ENCOUNTER
Patient 's wife calling requesting someone on the medical care team to call her regarding patient needing Nitroglycerin patch. Patient's wife stated that she has tried several times and if someone does not call her today, they will have to call Dr. Mcgarry's personal phone. Patient has been out and needs this today.

## (undated) DEVICE — SUT PROL 4-0 36IN RB1 DA BLU --

## (undated) DEVICE — Device: Brand: JELCO

## (undated) DEVICE — BASIC SINGLE BASIN-LF: Brand: MEDLINE INDUSTRIES, INC.

## (undated) DEVICE — SUTURE ABSRB 27IN SZ 4-0 17MM RB-1 1/2 CIR TAPR PNT MFIL U207H

## (undated) DEVICE — OCCLUDER CV VES 1.5X12 MM CORONARY INT SIL STRL FLO RST

## (undated) DEVICE — DRAPE SLUSH DISC W44XL66IN ST FOR RND BSIN HUSH SLUSH SYS

## (undated) DEVICE — SOLUTION IRRIG 1000ML LAC RINGER PLAS POUR BTL

## (undated) DEVICE — 3000CC GUARDIAN II: Brand: GUARDIAN

## (undated) DEVICE — SUTURE ETHBND EXCEL SZ 0 L18IN NONABSORBABLE GRN L36MM CT-1 CX21D

## (undated) DEVICE — CLIP INT SM TI EZ LD LIG SYS WECK HORZ

## (undated) DEVICE — TRAY FOLEY 16FR TEMP SENS F400 --

## (undated) DEVICE — SUTURE NONABSORBABLE MONOFILAMENT 5-0 C-1 1X24 IN PROLENE 8725H

## (undated) DEVICE — CATHETER DIAG 6FR L100CM LUMN ID0.056IN JL4 CRV 0 SIDE H

## (undated) DEVICE — GLIDESHEATH SLENDER STAINLESS STEEL KIT: Brand: GLIDESHEATH SLENDER

## (undated) DEVICE — CATHETER DIAG 6FR L110CM PIGTAILS CRV STYL PIG145 DXTERITY

## (undated) DEVICE — CANNULA PERF L1.8MM TIP L1MM S STL SHFT BLB SHP TIP FEM

## (undated) DEVICE — SUT PROL 3-0 36IN SH DA BLU --

## (undated) DEVICE — SUTURE SILK PERMAHAND PRECUT 6 X 30 IN SZ 1 BLK BRAID A307H

## (undated) DEVICE — (D)GLOVE SURG 8 PWD LTX -- DISC BY MFR USE ITEM 110052

## (undated) DEVICE — SUTURE ETHBND EXCEL SZ 3-0 L36IN NONABSORBABLE GRN RB-1 X558H

## (undated) DEVICE — 48" PROBE COVER W/GEL, ULTRASOUND, STERILE: Brand: SITE-RITE

## (undated) DEVICE — (D)PREP SKN CHLRAPRP APPL 26ML -- CONVERT TO ITEM 371833

## (undated) DEVICE — SUTURE NONABSORBABLE L24IN SZ 7-0 M0-5 BV175-8 EP 24 BLU M8745

## (undated) DEVICE — BAND COMPR L24CM REG CLR PLAS HEMSTAT EXT HK AND LOOP RETEN

## (undated) DEVICE — SUTURE PERMAHAND SZ 0 L30IN NONABSORBABLE BLK L30MM PSL 3/8 590H

## (undated) DEVICE — SUTURE VCRL 3-0 L36IN ABSRB UD CT L40MM 1/2 CIR TAPERPOINT J956H

## (undated) DEVICE — AMD ANTIMICROBIAL GAUZE SPONGES,12 PLY USP TYPE VII, 0.2% POLYHEXAMETHYLENE BIGUANIDE HCI (PHMB): Brand: CURITY

## (undated) DEVICE — SUTURE ETHBND EXCEL 2-0 L30IN NONABSORBABLE GRN L26MM SH MX563

## (undated) DEVICE — AMD ANTIMICROBIAL BANDAGE ROLL,6 PLY: Brand: KERLIX

## (undated) DEVICE — OCCLUDER CV VES 1.25X12 MM CORONARY INT SIL STRL FLO RST

## (undated) DEVICE — SUTURE PERMAHAND SZ 2-0 L12X18IN NONABSORBABLE BLK SILK A185H

## (undated) DEVICE — PLEDGET VASC W3/16XL3/8IN THK1/16IN PTFE SFT

## (undated) DEVICE — REM POLYHESIVE ADULT PATIENT RETURN ELECTRODE: Brand: VALLEYLAB

## (undated) DEVICE — DRAIN CHEST ADL/PED DRY/WET -- PLEUR-EVAC

## (undated) DEVICE — SOLUTION IV 1000ML 0.9% SOD CHL

## (undated) DEVICE — SUTURE PROL SZ 6-0 L30IN NONABSORBABLE BLU L13MM CC-1 3/8 M8707

## (undated) DEVICE — SUTURE S STL SZ 5 L18IN NONABSORBABLE SIL CCS L48MM 1/2 CIR M653G

## (undated) DEVICE — CATHETER DIAG 6FR L100CM LUMN ID0.056IN JR4 CRV 0 SIDE H

## (undated) DEVICE — (D)STRIP SKN CLSR 0.5X4IN WHT --

## (undated) DEVICE — BLADE SAW W10XL54MM FOR PRI REPEAT STRNOTMY

## (undated) DEVICE — LEAD PCMKR MYOCARDL BPLR TEMP. --

## (undated) DEVICE — SUTURE VCRL SZ 3-0 L27IN ABSRB UD L24MM PS-1 3/8 CIR PRIM J936H

## (undated) DEVICE — SUT SLK 0 30IN CT1 BLK --

## (undated) DEVICE — BLADE OPHTH 3MM CUT EDGE NDL

## (undated) DEVICE — Device

## (undated) DEVICE — BUTTON SWITCH PENCIL BLADE ELECTRODE, HOLSTER: Brand: EDGE

## (undated) DEVICE — AORTIC PUNCHES ARE USED TO CREATE A UNIFORM OPENING IN BLOOD VESSELS DURING CARDIOVASCULAR SURGERY. THE VESSEL GRAFT IS INSERTED INTO THE CREATED OPENING AND SUTURED TO THE VESSEL WALL. AORTIC LANCETS ARE USED TO MAKE A SMALL UNIFORM CUT IN A BLOOD VESSEL TO FACILITATE INSERTION OF AN AORTIC PUNCH.  PUNCHES COME IN VARIOUS LENGTHS, DIAMETERS AND TIP CONFIGURATIONS.: Brand: CLEANCUT ROTATING AORTIC PUNCH

## (undated) DEVICE — MEDI-TRACE CADENCE ADULT, DEFIBRILLATION ELECTRODE -RTS  (10 PR/PK) - ZOLL: Brand: MEDI-TRACE CADENCE

## (undated) DEVICE — SUTURE PROL SZ 5-0 L24IN NONABSORBABLE BLU L17MM RB-1 1/2 8555H

## (undated) DEVICE — CATH URETH INTMIT ROB 14FR FUN -- USE ITEM 179521

## (undated) DEVICE — SUTURE PROL SZ 7-0 L24IN NONABSORBABLE BLU L9.3MM BV-1 3/8 M8702

## (undated) DEVICE — CANNULA INJ L2.5IN BLNT TIP 3MM CLR BODY W/ 1 W VLV DLP

## (undated) DEVICE — GUIDEWIRE VASC L260CM DIA0.035IN RAD 3MM J TIP L7CM PTFE

## (undated) DEVICE — SUTURE MCRYL SZ 4-0 L27IN ABSRB UD L19MM PS-2 1/2 CIR PRIM Y426H

## (undated) DEVICE — CATHETER THOR 32FR L23IN PVC 5 EYELET STR ATRAUM

## (undated) DEVICE — STERILE HOOK LOCK LATEX FREE ELASTIC BANDAGE 4INX5YD: Brand: HOOK LOCK™

## (undated) DEVICE — STERILE HOOK LOCK LATEX FREE ELASTIC BANDAGE 6INX5YD: Brand: HOOK LOCK™

## (undated) DEVICE — AMD ANTIMICROBIAL NON-ADHERENT PAD,0.2% POLYHEXAMETHYLENE BIGUANIDE HCI (PHMB): Brand: TELFA

## (undated) DEVICE — CLAMP INSERT: Brand: STEALTH® CLAMP INSERT

## (undated) DEVICE — CATHETER THOR 32FR L23IN PVC 6 EYELET STR ATRAUM

## (undated) DEVICE — SOLUTION IRRIG 3000ML 0.9% SOD CHL FLX CONT 0797208] ICU MEDICAL INC]

## (undated) DEVICE — SUTURE COAT VCRL SZ 0 L36IN ABSRB VLT CTX L48MM TAPERPOINT J370H

## (undated) DEVICE — CONNECTOR IV 3/8X3/8X3/8 Y